# Patient Record
Sex: FEMALE | Race: WHITE | NOT HISPANIC OR LATINO
[De-identification: names, ages, dates, MRNs, and addresses within clinical notes are randomized per-mention and may not be internally consistent; named-entity substitution may affect disease eponyms.]

---

## 2021-01-01 ENCOUNTER — APPOINTMENT (OUTPATIENT)
Dept: VASCULAR SURGERY | Facility: HOSPITAL | Age: 61
End: 2021-01-01

## 2021-01-01 ENCOUNTER — APPOINTMENT (OUTPATIENT)
Dept: INFUSION THERAPY | Facility: CLINIC | Age: 61
End: 2021-01-01

## 2021-01-01 ENCOUNTER — NON-APPOINTMENT (OUTPATIENT)
Age: 61
End: 2021-01-01

## 2021-01-01 ENCOUNTER — RESULT REVIEW (OUTPATIENT)
Age: 61
End: 2021-01-01

## 2021-01-01 ENCOUNTER — APPOINTMENT (OUTPATIENT)
Dept: HEMATOLOGY ONCOLOGY | Facility: CLINIC | Age: 61
End: 2021-01-01
Payer: COMMERCIAL

## 2021-01-01 ENCOUNTER — APPOINTMENT (OUTPATIENT)
Dept: HEMATOLOGY ONCOLOGY | Facility: CLINIC | Age: 61
End: 2021-01-01

## 2021-01-01 ENCOUNTER — OUTPATIENT (OUTPATIENT)
Dept: OUTPATIENT SERVICES | Facility: HOSPITAL | Age: 61
LOS: 1 days | Discharge: HOME | End: 2021-01-01

## 2021-01-01 ENCOUNTER — TRANSCRIPTION ENCOUNTER (OUTPATIENT)
Age: 61
End: 2021-01-01

## 2021-01-01 ENCOUNTER — APPOINTMENT (OUTPATIENT)
Dept: VASCULAR SURGERY | Facility: CLINIC | Age: 61
End: 2021-01-01
Payer: COMMERCIAL

## 2021-01-01 ENCOUNTER — LABORATORY RESULT (OUTPATIENT)
Age: 61
End: 2021-01-01

## 2021-01-01 ENCOUNTER — APPOINTMENT (OUTPATIENT)
Dept: GASTROENTEROLOGY | Facility: CLINIC | Age: 61
End: 2021-01-01

## 2021-01-01 ENCOUNTER — EMERGENCY (EMERGENCY)
Facility: HOSPITAL | Age: 61
LOS: 0 days | Discharge: HOME | End: 2021-09-02
Attending: STUDENT IN AN ORGANIZED HEALTH CARE EDUCATION/TRAINING PROGRAM | Admitting: STUDENT IN AN ORGANIZED HEALTH CARE EDUCATION/TRAINING PROGRAM
Payer: COMMERCIAL

## 2021-01-01 ENCOUNTER — EMERGENCY (EMERGENCY)
Facility: HOSPITAL | Age: 61
LOS: 0 days | Discharge: HOME | End: 2021-09-13
Attending: EMERGENCY MEDICINE | Admitting: EMERGENCY MEDICINE
Payer: COMMERCIAL

## 2021-01-01 ENCOUNTER — OUTPATIENT (OUTPATIENT)
Dept: OUTPATIENT SERVICES | Facility: HOSPITAL | Age: 61
LOS: 1 days | Discharge: HOME | End: 2021-01-01
Payer: COMMERCIAL

## 2021-01-01 ENCOUNTER — APPOINTMENT (OUTPATIENT)
Dept: GASTROENTEROLOGY | Facility: CLINIC | Age: 61
End: 2021-01-01
Payer: COMMERCIAL

## 2021-01-01 ENCOUNTER — INPATIENT (INPATIENT)
Facility: HOSPITAL | Age: 61
LOS: 3 days | Discharge: HOME | End: 2021-12-03
Attending: HOSPITALIST | Admitting: HOSPITALIST
Payer: COMMERCIAL

## 2021-01-01 ENCOUNTER — INPATIENT (INPATIENT)
Facility: HOSPITAL | Age: 61
LOS: 0 days | Discharge: HOME | End: 2021-08-04
Attending: HOSPITALIST | Admitting: HOSPITALIST
Payer: COMMERCIAL

## 2021-01-01 ENCOUNTER — APPOINTMENT (OUTPATIENT)
Dept: INFUSION THERAPY | Facility: CLINIC | Age: 61
End: 2021-01-01
Payer: COMMERCIAL

## 2021-01-01 VITALS
OXYGEN SATURATION: 98 % | DIASTOLIC BLOOD PRESSURE: 63 MMHG | TEMPERATURE: 98 F | RESPIRATION RATE: 21 BRPM | HEART RATE: 70 BPM | SYSTOLIC BLOOD PRESSURE: 131 MMHG

## 2021-01-01 VITALS
DIASTOLIC BLOOD PRESSURE: 70 MMHG | SYSTOLIC BLOOD PRESSURE: 117 MMHG | OXYGEN SATURATION: 100 % | RESPIRATION RATE: 16 BRPM | HEART RATE: 70 BPM

## 2021-01-01 VITALS
RESPIRATION RATE: 18 BRPM | DIASTOLIC BLOOD PRESSURE: 69 MMHG | OXYGEN SATURATION: 100 % | SYSTOLIC BLOOD PRESSURE: 137 MMHG | TEMPERATURE: 97 F | HEART RATE: 84 BPM

## 2021-01-01 VITALS
DIASTOLIC BLOOD PRESSURE: 69 MMHG | RESPIRATION RATE: 18 BRPM | OXYGEN SATURATION: 96 % | SYSTOLIC BLOOD PRESSURE: 142 MMHG | HEART RATE: 80 BPM

## 2021-01-01 VITALS
TEMPERATURE: 98 F | HEIGHT: 66 IN | DIASTOLIC BLOOD PRESSURE: 100 MMHG | HEART RATE: 98 BPM | WEIGHT: 160.06 LBS | RESPIRATION RATE: 18 BRPM | SYSTOLIC BLOOD PRESSURE: 141 MMHG

## 2021-01-01 VITALS
SYSTOLIC BLOOD PRESSURE: 134 MMHG | TEMPERATURE: 98 F | RESPIRATION RATE: 18 BRPM | DIASTOLIC BLOOD PRESSURE: 86 MMHG | HEART RATE: 102 BPM | HEIGHT: 66 IN | WEIGHT: 145.06 LBS

## 2021-01-01 VITALS
HEART RATE: 106 BPM | BODY MASS INDEX: 25.23 KG/M2 | DIASTOLIC BLOOD PRESSURE: 76 MMHG | SYSTOLIC BLOOD PRESSURE: 128 MMHG | TEMPERATURE: 98.7 F | RESPIRATION RATE: 16 BRPM | HEIGHT: 66 IN | WEIGHT: 157 LBS | OXYGEN SATURATION: 98 %

## 2021-01-01 VITALS
RESPIRATION RATE: 16 BRPM | HEART RATE: 84 BPM | HEIGHT: 66 IN | TEMPERATURE: 97 F | SYSTOLIC BLOOD PRESSURE: 143 MMHG | WEIGHT: 160.06 LBS | OXYGEN SATURATION: 96 % | DIASTOLIC BLOOD PRESSURE: 91 MMHG

## 2021-01-01 VITALS
SYSTOLIC BLOOD PRESSURE: 133 MMHG | HEART RATE: 97 BPM | TEMPERATURE: 98 F | RESPIRATION RATE: 18 BRPM | OXYGEN SATURATION: 99 % | DIASTOLIC BLOOD PRESSURE: 66 MMHG | WEIGHT: 132.06 LBS | HEIGHT: 66 IN

## 2021-01-01 VITALS
DIASTOLIC BLOOD PRESSURE: 69 MMHG | RESPIRATION RATE: 18 BRPM | TEMPERATURE: 98 F | SYSTOLIC BLOOD PRESSURE: 101 MMHG | HEART RATE: 90 BPM

## 2021-01-01 VITALS
TEMPERATURE: 98 F | HEIGHT: 66 IN | RESPIRATION RATE: 18 BRPM | WEIGHT: 130.07 LBS | DIASTOLIC BLOOD PRESSURE: 66 MMHG | OXYGEN SATURATION: 99 % | HEART RATE: 98 BPM | SYSTOLIC BLOOD PRESSURE: 133 MMHG

## 2021-01-01 VITALS
HEART RATE: 96 BPM | SYSTOLIC BLOOD PRESSURE: 123 MMHG | RESPIRATION RATE: 16 BRPM | WEIGHT: 150 LBS | DIASTOLIC BLOOD PRESSURE: 76 MMHG | HEIGHT: 66 IN | TEMPERATURE: 97.7 F | BODY MASS INDEX: 24.11 KG/M2

## 2021-01-01 VITALS
WEIGHT: 146 LBS | TEMPERATURE: 97.8 F | BODY MASS INDEX: 22.82 KG/M2 | SYSTOLIC BLOOD PRESSURE: 123 MMHG | WEIGHT: 142 LBS | HEART RATE: 103 BPM | HEIGHT: 66 IN | OXYGEN SATURATION: 98 % | HEART RATE: 108 BPM | HEIGHT: 66 IN | BODY MASS INDEX: 23.46 KG/M2 | SYSTOLIC BLOOD PRESSURE: 114 MMHG | DIASTOLIC BLOOD PRESSURE: 79 MMHG | RESPIRATION RATE: 16 BRPM | TEMPERATURE: 97.7 F | DIASTOLIC BLOOD PRESSURE: 78 MMHG

## 2021-01-01 VITALS
HEIGHT: 66 IN | HEART RATE: 105 BPM | DIASTOLIC BLOOD PRESSURE: 71 MMHG | TEMPERATURE: 96.6 F | BODY MASS INDEX: 17.68 KG/M2 | WEIGHT: 110 LBS | RESPIRATION RATE: 14 BRPM | SYSTOLIC BLOOD PRESSURE: 94 MMHG

## 2021-01-01 VITALS — SYSTOLIC BLOOD PRESSURE: 100 MMHG | HEART RATE: 82 BPM | DIASTOLIC BLOOD PRESSURE: 60 MMHG

## 2021-01-01 VITALS
HEART RATE: 106 BPM | DIASTOLIC BLOOD PRESSURE: 62 MMHG | TEMPERATURE: 98 F | RESPIRATION RATE: 18 BRPM | HEIGHT: 66 IN | OXYGEN SATURATION: 100 % | SYSTOLIC BLOOD PRESSURE: 113 MMHG

## 2021-01-01 VITALS
HEART RATE: 101 BPM | DIASTOLIC BLOOD PRESSURE: 71 MMHG | RESPIRATION RATE: 18 BRPM | WEIGHT: 108 LBS | TEMPERATURE: 98.6 F | HEIGHT: 66 IN | SYSTOLIC BLOOD PRESSURE: 113 MMHG | BODY MASS INDEX: 17.36 KG/M2

## 2021-01-01 VITALS
DIASTOLIC BLOOD PRESSURE: 87 MMHG | HEART RATE: 92 BPM | SYSTOLIC BLOOD PRESSURE: 163 MMHG | TEMPERATURE: 98 F | OXYGEN SATURATION: 97 % | RESPIRATION RATE: 18 BRPM

## 2021-01-01 VITALS
DIASTOLIC BLOOD PRESSURE: 71 MMHG | RESPIRATION RATE: 18 BRPM | SYSTOLIC BLOOD PRESSURE: 130 MMHG | OXYGEN SATURATION: 100 % | TEMPERATURE: 95 F | HEIGHT: 66 IN | HEART RATE: 101 BPM | WEIGHT: 139.99 LBS

## 2021-01-01 DIAGNOSIS — R60.0 LOCALIZED EDEMA: ICD-10-CM

## 2021-01-01 DIAGNOSIS — Z85.07 PERSONAL HISTORY OF MALIGNANT NEOPLASM OF PANCREAS: ICD-10-CM

## 2021-01-01 DIAGNOSIS — K86.1 OTHER CHRONIC PANCREATITIS: ICD-10-CM

## 2021-01-01 DIAGNOSIS — E87.6 HYPOKALEMIA: ICD-10-CM

## 2021-01-01 DIAGNOSIS — R18.8 OTHER ASCITES: ICD-10-CM

## 2021-01-01 DIAGNOSIS — I10 ESSENTIAL (PRIMARY) HYPERTENSION: ICD-10-CM

## 2021-01-01 DIAGNOSIS — Z92.21 PERSONAL HISTORY OF ANTINEOPLASTIC CHEMOTHERAPY: ICD-10-CM

## 2021-01-01 DIAGNOSIS — E78.5 HYPERLIPIDEMIA, UNSPECIFIED: ICD-10-CM

## 2021-01-01 DIAGNOSIS — E11.9 TYPE 2 DIABETES MELLITUS WITHOUT COMPLICATIONS: ICD-10-CM

## 2021-01-01 DIAGNOSIS — D49.0 NEOPLASM OF UNSPECIFIED BEHAVIOR OF DIGESTIVE SYSTEM: ICD-10-CM

## 2021-01-01 DIAGNOSIS — K29.50 UNSPECIFIED CHRONIC GASTRITIS WITHOUT BLEEDING: ICD-10-CM

## 2021-01-01 DIAGNOSIS — I81 PORTAL VEIN THROMBOSIS: ICD-10-CM

## 2021-01-01 DIAGNOSIS — K52.1 TOXIC GASTROENTERITIS AND COLITIS: ICD-10-CM

## 2021-01-01 DIAGNOSIS — T45.1X5A ADVERSE EFFECT OF ANTINEOPLASTIC AND IMMUNOSUPPRESSIVE DRUGS, INITIAL ENCOUNTER: ICD-10-CM
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
## 2021-01-01 DIAGNOSIS — R19.7 DIARRHEA, UNSPECIFIED: ICD-10-CM

## 2021-01-01 DIAGNOSIS — Z78.9 OTHER SPECIFIED HEALTH STATUS: ICD-10-CM

## 2021-01-01 DIAGNOSIS — R60.1 GENERALIZED EDEMA: ICD-10-CM

## 2021-01-01 DIAGNOSIS — D69.59 OTHER SECONDARY THROMBOCYTOPENIA: ICD-10-CM

## 2021-01-01 DIAGNOSIS — E87.1 HYPO-OSMOLALITY AND HYPONATREMIA: ICD-10-CM

## 2021-01-01 DIAGNOSIS — R09.89 OTHER SPECIFIED SYMPTOMS AND SIGNS INVOLVING THE CIRCULATORY AND RESPIRATORY SYSTEMS: ICD-10-CM

## 2021-01-01 DIAGNOSIS — C25.9 MALIGNANT NEOPLASM OF PANCREAS, UNSPECIFIED: ICD-10-CM

## 2021-01-01 DIAGNOSIS — K86.89 OTHER SPECIFIED DISEASES OF PANCREAS: ICD-10-CM

## 2021-01-01 DIAGNOSIS — Z20.822 CONTACT WITH AND (SUSPECTED) EXPOSURE TO COVID-19: ICD-10-CM

## 2021-01-01 DIAGNOSIS — K29.80 DUODENITIS WITHOUT BLEEDING: ICD-10-CM

## 2021-01-01 DIAGNOSIS — Z00.00 ENCOUNTER FOR GENERAL ADULT MEDICAL EXAMINATION W/OUT ABNORMAL FINDINGS: ICD-10-CM

## 2021-01-01 DIAGNOSIS — D69.6 THROMBOCYTOPENIA, UNSPECIFIED: ICD-10-CM

## 2021-01-01 DIAGNOSIS — R93.3 ABNORMAL FINDINGS ON DIAGNOSTIC IMAGING OF OTHER PARTS OF DIGESTIVE TRACT: ICD-10-CM

## 2021-01-01 DIAGNOSIS — I82.890 ACUTE EMBOLISM AND THROMBOSIS OF OTHER SPECIFIED VEINS: ICD-10-CM

## 2021-01-01 DIAGNOSIS — J90 PLEURAL EFFUSION, NOT ELSEWHERE CLASSIFIED: ICD-10-CM

## 2021-01-01 DIAGNOSIS — Z11.59 ENCOUNTER FOR SCREENING FOR OTHER VIRAL DISEASES: ICD-10-CM

## 2021-01-01 DIAGNOSIS — C25.8 MALIGNANT NEOPLASM OF OVERLAPPING SITES OF PANCREAS: ICD-10-CM

## 2021-01-01 DIAGNOSIS — Z86.79 PERSONAL HISTORY OF OTHER DISEASES OF THE CIRCULATORY SYSTEM: ICD-10-CM

## 2021-01-01 DIAGNOSIS — M79.89 OTHER SPECIFIED SOFT TISSUE DISORDERS: ICD-10-CM

## 2021-01-01 DIAGNOSIS — R00.0 TACHYCARDIA, UNSPECIFIED: ICD-10-CM

## 2021-01-01 DIAGNOSIS — R94.31 ABNORMAL ELECTROCARDIOGRAM [ECG] [EKG]: ICD-10-CM

## 2021-01-01 DIAGNOSIS — K25.9 GASTRIC ULCER, UNSPECIFIED AS ACUTE OR CHRONIC, WITHOUT HEMORRHAGE OR PERFORATION: ICD-10-CM

## 2021-01-01 DIAGNOSIS — Z80.0 FAMILY HISTORY OF MALIGNANT NEOPLASM OF DIGESTIVE ORGANS: ICD-10-CM

## 2021-01-01 LAB
ALBUMIN FLD-MCNC: 0.7 G/DL — SIGNIFICANT CHANGE UP
ALBUMIN SERPL ELPH-MCNC: 2.3 G/DL
ALBUMIN SERPL ELPH-MCNC: 2.3 G/DL — LOW (ref 3.5–5.2)
ALBUMIN SERPL ELPH-MCNC: 2.3 G/DL — LOW (ref 3.5–5.2)
ALBUMIN SERPL ELPH-MCNC: 2.4 G/DL — LOW (ref 3.5–5.2)
ALBUMIN SERPL ELPH-MCNC: 2.5 G/DL
ALBUMIN SERPL ELPH-MCNC: 3 G/DL
ALBUMIN SERPL ELPH-MCNC: 3.1 G/DL — LOW (ref 3.5–5.2)
ALBUMIN SERPL ELPH-MCNC: 3.4 G/DL
ALBUMIN SERPL ELPH-MCNC: 3.5 G/DL — SIGNIFICANT CHANGE UP (ref 3.5–5.2)
ALBUMIN SERPL ELPH-MCNC: 3.5 G/DL — SIGNIFICANT CHANGE UP (ref 3.5–5.2)
ALBUMIN SERPL ELPH-MCNC: 3.6 G/DL — SIGNIFICANT CHANGE UP (ref 3.5–5.2)
ALBUMIN SERPL ELPH-MCNC: 3.9 G/DL — SIGNIFICANT CHANGE UP (ref 3.5–5.2)
ALP BLD-CCNC: 107 U/L
ALP BLD-CCNC: 118 U/L
ALP BLD-CCNC: 171 U/L
ALP BLD-CCNC: 445 U/L
ALP SERPL-CCNC: 100 U/L — SIGNIFICANT CHANGE UP (ref 30–115)
ALP SERPL-CCNC: 107 U/L — SIGNIFICANT CHANGE UP (ref 30–115)
ALP SERPL-CCNC: 108 U/L — SIGNIFICANT CHANGE UP (ref 30–115)
ALP SERPL-CCNC: 122 U/L — HIGH (ref 30–115)
ALP SERPL-CCNC: 128 U/L — HIGH (ref 30–115)
ALP SERPL-CCNC: 135 U/L — HIGH (ref 30–115)
ALP SERPL-CCNC: 142 U/L — HIGH (ref 30–115)
ALP SERPL-CCNC: 216 U/L — HIGH (ref 30–115)
ALP SERPL-CCNC: 229 U/L — HIGH (ref 30–115)
ALP SERPL-CCNC: 98 U/L — SIGNIFICANT CHANGE UP (ref 30–115)
ALT FLD-CCNC: 11 U/L — SIGNIFICANT CHANGE UP (ref 0–41)
ALT FLD-CCNC: 15 U/L — SIGNIFICANT CHANGE UP (ref 0–41)
ALT FLD-CCNC: 16 U/L — SIGNIFICANT CHANGE UP (ref 0–41)
ALT FLD-CCNC: 17 U/L — SIGNIFICANT CHANGE UP (ref 0–41)
ALT FLD-CCNC: 18 U/L — SIGNIFICANT CHANGE UP (ref 0–41)
ALT FLD-CCNC: 18 U/L — SIGNIFICANT CHANGE UP (ref 0–41)
ALT FLD-CCNC: 19 U/L — SIGNIFICANT CHANGE UP (ref 0–41)
ALT FLD-CCNC: 20 U/L — SIGNIFICANT CHANGE UP (ref 0–41)
ALT SERPL-CCNC: 15 U/L
ALT SERPL-CCNC: 16 U/L
ALT SERPL-CCNC: 16 U/L
ALT SERPL-CCNC: 9 U/L
ANION GAP SERPL CALC-SCNC: 10 MMOL/L — SIGNIFICANT CHANGE UP (ref 7–14)
ANION GAP SERPL CALC-SCNC: 11 MMOL/L — SIGNIFICANT CHANGE UP (ref 7–14)
ANION GAP SERPL CALC-SCNC: 12 MMOL/L
ANION GAP SERPL CALC-SCNC: 13 MMOL/L
ANION GAP SERPL CALC-SCNC: 13 MMOL/L — SIGNIFICANT CHANGE UP (ref 7–14)
ANION GAP SERPL CALC-SCNC: 13 MMOL/L — SIGNIFICANT CHANGE UP (ref 7–14)
ANION GAP SERPL CALC-SCNC: 14 MMOL/L
ANION GAP SERPL CALC-SCNC: 14 MMOL/L — SIGNIFICANT CHANGE UP (ref 7–14)
ANION GAP SERPL CALC-SCNC: 15 MMOL/L
ANION GAP SERPL CALC-SCNC: 16 MMOL/L — HIGH (ref 7–14)
ANION GAP SERPL CALC-SCNC: 16 MMOL/L — HIGH (ref 7–14)
ANION GAP SERPL CALC-SCNC: 17 MMOL/L — HIGH (ref 7–14)
ANISOCYTOSIS BLD QL: SIGNIFICANT CHANGE UP
ANISOCYTOSIS BLD QL: SLIGHT — SIGNIFICANT CHANGE UP
ANISOCYTOSIS BLD QL: SLIGHT — SIGNIFICANT CHANGE UP
APTT BLD: 28.6 SEC — SIGNIFICANT CHANGE UP (ref 27–39.2)
APTT BLD: 29.5 SEC — SIGNIFICANT CHANGE UP (ref 27–39.2)
AST SERPL-CCNC: 12 U/L — SIGNIFICANT CHANGE UP (ref 0–41)
AST SERPL-CCNC: 18 U/L
AST SERPL-CCNC: 19 U/L — SIGNIFICANT CHANGE UP (ref 0–41)
AST SERPL-CCNC: 20 U/L — SIGNIFICANT CHANGE UP (ref 0–41)
AST SERPL-CCNC: 20 U/L — SIGNIFICANT CHANGE UP (ref 0–41)
AST SERPL-CCNC: 23 U/L
AST SERPL-CCNC: 26 U/L — SIGNIFICANT CHANGE UP (ref 0–41)
AST SERPL-CCNC: 28 U/L
AST SERPL-CCNC: 29 U/L
AST SERPL-CCNC: 34 U/L — SIGNIFICANT CHANGE UP (ref 0–41)
AST SERPL-CCNC: 35 U/L — SIGNIFICANT CHANGE UP (ref 0–41)
AST SERPL-CCNC: 38 U/L — SIGNIFICANT CHANGE UP (ref 0–41)
AST SERPL-CCNC: 41 U/L — SIGNIFICANT CHANGE UP (ref 0–41)
AST SERPL-CCNC: 43 U/L — HIGH (ref 0–41)
B PERT IGG+IGM PNL SER: CLEAR — SIGNIFICANT CHANGE UP
BASOPHILS # BLD AUTO: 0 K/UL — SIGNIFICANT CHANGE UP (ref 0–0.2)
BASOPHILS # BLD AUTO: 0.01 K/UL — SIGNIFICANT CHANGE UP (ref 0–0.2)
BASOPHILS NFR BLD AUTO: 0 % — SIGNIFICANT CHANGE UP (ref 0–1)
BASOPHILS NFR BLD AUTO: 0.1 % — SIGNIFICANT CHANGE UP (ref 0–1)
BASOPHILS NFR BLD AUTO: 0.2 % — SIGNIFICANT CHANGE UP (ref 0–1)
BASOPHILS NFR BLD AUTO: 0.2 % — SIGNIFICANT CHANGE UP (ref 0–1)
BASOPHILS NFR BLD AUTO: 0.3 % — SIGNIFICANT CHANGE UP (ref 0–1)
BILIRUB SERPL-MCNC: 0.6 MG/DL
BILIRUB SERPL-MCNC: 0.9 MG/DL — SIGNIFICANT CHANGE UP (ref 0.2–1.2)
BILIRUB SERPL-MCNC: 1 MG/DL — SIGNIFICANT CHANGE UP (ref 0.2–1.2)
BILIRUB SERPL-MCNC: 1.1 MG/DL
BILIRUB SERPL-MCNC: 1.1 MG/DL
BILIRUB SERPL-MCNC: 1.1 MG/DL — SIGNIFICANT CHANGE UP (ref 0.2–1.2)
BILIRUB SERPL-MCNC: 1.2 MG/DL — SIGNIFICANT CHANGE UP (ref 0.2–1.2)
BILIRUB SERPL-MCNC: 1.2 MG/DL — SIGNIFICANT CHANGE UP (ref 0.2–1.2)
BILIRUB SERPL-MCNC: 1.9 MG/DL
BLD GP AB SCN SERPL QL: SIGNIFICANT CHANGE UP
BUN SERPL-MCNC: 11 MG/DL — SIGNIFICANT CHANGE UP (ref 10–20)
BUN SERPL-MCNC: 14 MG/DL
BUN SERPL-MCNC: 15 MG/DL — SIGNIFICANT CHANGE UP (ref 10–20)
BUN SERPL-MCNC: 16 MG/DL
BUN SERPL-MCNC: 4 MG/DL — LOW (ref 10–20)
BUN SERPL-MCNC: 5 MG/DL
BUN SERPL-MCNC: 5 MG/DL — LOW (ref 10–20)
BUN SERPL-MCNC: 5 MG/DL — LOW (ref 10–20)
BUN SERPL-MCNC: 7 MG/DL
BUN SERPL-MCNC: 7 MG/DL — LOW (ref 10–20)
BUN SERPL-MCNC: 9 MG/DL — LOW (ref 10–20)
BUN SERPL-MCNC: 9 MG/DL — LOW (ref 10–20)
CALCIUM SERPL-MCNC: 7.4 MG/DL
CALCIUM SERPL-MCNC: 7.5 MG/DL — LOW (ref 8.5–10.1)
CALCIUM SERPL-MCNC: 7.7 MG/DL — LOW (ref 8.5–10.1)
CALCIUM SERPL-MCNC: 7.8 MG/DL — LOW (ref 8.5–10.1)
CALCIUM SERPL-MCNC: 8 MG/DL
CALCIUM SERPL-MCNC: 8.6 MG/DL
CALCIUM SERPL-MCNC: 8.7 MG/DL — SIGNIFICANT CHANGE UP (ref 8.5–10.1)
CALCIUM SERPL-MCNC: 8.9 MG/DL — SIGNIFICANT CHANGE UP (ref 8.5–10.1)
CALCIUM SERPL-MCNC: 9 MG/DL
CALCIUM SERPL-MCNC: 9.1 MG/DL — SIGNIFICANT CHANGE UP (ref 8.5–10.1)
CALCIUM SERPL-MCNC: 9.4 MG/DL — SIGNIFICANT CHANGE UP (ref 8.5–10.1)
CALCIUM SERPL-MCNC: 9.6 MG/DL — SIGNIFICANT CHANGE UP (ref 8.5–10.1)
CANCER AG19-9 SERPL-ACNC: 652 U/ML
CHLORIDE SERPL-SCNC: 100 MMOL/L — SIGNIFICANT CHANGE UP (ref 98–110)
CHLORIDE SERPL-SCNC: 100 MMOL/L — SIGNIFICANT CHANGE UP (ref 98–110)
CHLORIDE SERPL-SCNC: 101 MMOL/L
CHLORIDE SERPL-SCNC: 101 MMOL/L — SIGNIFICANT CHANGE UP (ref 98–110)
CHLORIDE SERPL-SCNC: 103 MMOL/L — SIGNIFICANT CHANGE UP (ref 98–110)
CHLORIDE SERPL-SCNC: 104 MMOL/L — SIGNIFICANT CHANGE UP (ref 98–110)
CHLORIDE SERPL-SCNC: 106 MMOL/L
CHLORIDE SERPL-SCNC: 106 MMOL/L — SIGNIFICANT CHANGE UP (ref 98–110)
CHLORIDE SERPL-SCNC: 108 MMOL/L — SIGNIFICANT CHANGE UP (ref 98–110)
CHLORIDE SERPL-SCNC: 96 MMOL/L
CHLORIDE SERPL-SCNC: 98 MMOL/L
CHLORIDE SERPL-SCNC: 98 MMOL/L — SIGNIFICANT CHANGE UP (ref 98–110)
CHLORIDE SERPL-SCNC: 99 MMOL/L — SIGNIFICANT CHANGE UP (ref 98–110)
CHLORIDE SERPL-SCNC: 99 MMOL/L — SIGNIFICANT CHANGE UP (ref 98–110)
CO2 SERPL-SCNC: 14 MMOL/L — LOW (ref 17–32)
CO2 SERPL-SCNC: 18 MMOL/L
CO2 SERPL-SCNC: 18 MMOL/L — SIGNIFICANT CHANGE UP (ref 17–32)
CO2 SERPL-SCNC: 18 MMOL/L — SIGNIFICANT CHANGE UP (ref 17–32)
CO2 SERPL-SCNC: 19 MMOL/L — SIGNIFICANT CHANGE UP (ref 17–32)
CO2 SERPL-SCNC: 20 MMOL/L
CO2 SERPL-SCNC: 21 MMOL/L
CO2 SERPL-SCNC: 21 MMOL/L — SIGNIFICANT CHANGE UP (ref 17–32)
CO2 SERPL-SCNC: 22 MMOL/L — SIGNIFICANT CHANGE UP (ref 17–32)
CO2 SERPL-SCNC: 22 MMOL/L — SIGNIFICANT CHANGE UP (ref 17–32)
CO2 SERPL-SCNC: 25 MMOL/L
CO2 SERPL-SCNC: 25 MMOL/L — SIGNIFICANT CHANGE UP (ref 17–32)
COLOR FLD: SIGNIFICANT CHANGE UP
COVID-19 SPIKE DOMAIN AB INTERP: POSITIVE
COVID-19 SPIKE DOMAIN ANTIBODY RESULT: >250 U/ML — HIGH
CREAT SERPL-MCNC: 0.5 MG/DL
CREAT SERPL-MCNC: 0.5 MG/DL
CREAT SERPL-MCNC: 0.5 MG/DL — LOW (ref 0.7–1.5)
CREAT SERPL-MCNC: 0.6 MG/DL
CREAT SERPL-MCNC: 0.6 MG/DL — LOW (ref 0.7–1.5)
CREAT SERPL-MCNC: 0.6 MG/DL — LOW (ref 0.7–1.5)
CREAT SERPL-MCNC: 0.7 MG/DL — SIGNIFICANT CHANGE UP (ref 0.7–1.5)
CREAT SERPL-MCNC: 0.8 MG/DL — SIGNIFICANT CHANGE UP (ref 0.7–1.5)
CREAT SERPL-MCNC: <0.5 MG/DL
CREAT SERPL-MCNC: <0.5 MG/DL — LOW (ref 0.7–1.5)
CULTURE RESULTS: NO GROWTH — SIGNIFICANT CHANGE UP
EOSINOPHIL # BLD AUTO: 0 K/UL — SIGNIFICANT CHANGE UP (ref 0–0.7)
EOSINOPHIL # BLD AUTO: 0.01 K/UL — SIGNIFICANT CHANGE UP (ref 0–0.7)
EOSINOPHIL NFR BLD AUTO: 0 % — SIGNIFICANT CHANGE UP (ref 0–8)
EOSINOPHIL NFR BLD AUTO: 0.2 % — SIGNIFICANT CHANGE UP (ref 0–8)
FERRITIN SERPL-MCNC: 1919 NG/ML
FLUID INTAKE SUBSTANCE CLASS: SIGNIFICANT CHANGE UP
FLUID SEGMENTED GRANULOCYTES: SIGNIFICANT CHANGE UP %
FOLATE SERPL-MCNC: 6.9 NG/ML — SIGNIFICANT CHANGE UP
GIANT PLATELETS BLD QL SMEAR: PRESENT — SIGNIFICANT CHANGE UP
GLUCOSE FLD-MCNC: 110 MG/DL — SIGNIFICANT CHANGE UP
GLUCOSE SERPL-MCNC: 100 MG/DL — HIGH (ref 70–99)
GLUCOSE SERPL-MCNC: 102 MG/DL
GLUCOSE SERPL-MCNC: 105 MG/DL
GLUCOSE SERPL-MCNC: 127 MG/DL
GLUCOSE SERPL-MCNC: 149 MG/DL — HIGH (ref 70–99)
GLUCOSE SERPL-MCNC: 150 MG/DL — HIGH (ref 70–99)
GLUCOSE SERPL-MCNC: 168 MG/DL — HIGH (ref 70–99)
GLUCOSE SERPL-MCNC: 176 MG/DL — HIGH (ref 70–99)
GLUCOSE SERPL-MCNC: 77 MG/DL — SIGNIFICANT CHANGE UP (ref 70–99)
GLUCOSE SERPL-MCNC: 83 MG/DL — SIGNIFICANT CHANGE UP (ref 70–99)
GLUCOSE SERPL-MCNC: 89 MG/DL
GLUCOSE SERPL-MCNC: 89 MG/DL — SIGNIFICANT CHANGE UP (ref 70–99)
GLUCOSE SERPL-MCNC: 96 MG/DL — SIGNIFICANT CHANGE UP (ref 70–99)
GLUCOSE SERPL-MCNC: 99 MG/DL — SIGNIFICANT CHANGE UP (ref 70–99)
GRAM STN FLD: SIGNIFICANT CHANGE UP
HCT VFR BLD CALC: 22.2 % — LOW (ref 37–47)
HCT VFR BLD CALC: 22.3 % — LOW (ref 37–47)
HCT VFR BLD CALC: 25 % — LOW (ref 37–47)
HCT VFR BLD CALC: 26.4 % — LOW (ref 37–47)
HCT VFR BLD CALC: 27.3 % — LOW (ref 37–47)
HCT VFR BLD CALC: 27.7 % — LOW (ref 37–47)
HCT VFR BLD CALC: 28.2 % — LOW (ref 37–47)
HCT VFR BLD CALC: 28.4 %
HCT VFR BLD CALC: 28.8 %
HCT VFR BLD CALC: 30.6 %
HCT VFR BLD CALC: 32 %
HCT VFR BLD CALC: 32.4 %
HCT VFR BLD CALC: 33.3 % — LOW (ref 37–47)
HCT VFR BLD CALC: 33.8 %
HCT VFR BLD CALC: 34.6 % — LOW (ref 37–47)
HCT VFR BLD CALC: 35.4 %
HCT VFR BLD CALC: 36 % — LOW (ref 37–47)
HCT VFR BLD CALC: 37.5 %
HCT VFR BLD CALC: 37.5 %
HCT VFR BLD CALC: 38.2 % — SIGNIFICANT CHANGE UP (ref 37–47)
HCV AB S/CO SERPL IA: 0.04 COI — SIGNIFICANT CHANGE UP
HCV AB SERPL-IMP: SIGNIFICANT CHANGE UP
HGB BLD-MCNC: 10.2 G/DL
HGB BLD-MCNC: 10.7 G/DL
HGB BLD-MCNC: 10.8 G/DL
HGB BLD-MCNC: 11 G/DL
HGB BLD-MCNC: 11.3 G/DL — LOW (ref 12–16)
HGB BLD-MCNC: 12 G/DL — SIGNIFICANT CHANGE UP (ref 12–16)
HGB BLD-MCNC: 12.2 G/DL — SIGNIFICANT CHANGE UP (ref 12–16)
HGB BLD-MCNC: 12.4 G/DL
HGB BLD-MCNC: 12.7 G/DL
HGB BLD-MCNC: 12.8 G/DL
HGB BLD-MCNC: 13.2 G/DL — SIGNIFICANT CHANGE UP (ref 12–16)
HGB BLD-MCNC: 7.5 G/DL — LOW (ref 12–16)
HGB BLD-MCNC: 7.6 G/DL — LOW (ref 12–16)
HGB BLD-MCNC: 8.3 G/DL — LOW (ref 12–16)
HGB BLD-MCNC: 8.8 G/DL — LOW (ref 12–16)
HGB BLD-MCNC: 9 G/DL — LOW (ref 12–16)
HGB BLD-MCNC: 9.1 G/DL — LOW (ref 12–16)
HGB BLD-MCNC: 9.3 G/DL — LOW (ref 12–16)
HGB BLD-MCNC: 9.7 G/DL
HGB BLD-MCNC: 9.8 G/DL
HOWELL-JOLLY BOD BLD QL SMEAR: PRESENT — SIGNIFICANT CHANGE UP
IMM GRANULOCYTES NFR BLD AUTO: 0.5 % — HIGH (ref 0.1–0.3)
IMM GRANULOCYTES NFR BLD AUTO: 0.7 % — HIGH (ref 0.1–0.3)
IMM GRANULOCYTES NFR BLD AUTO: 2.1 % — HIGH (ref 0.1–0.3)
IMM GRANULOCYTES NFR BLD AUTO: 5.1 % — HIGH (ref 0.1–0.3)
INR BLD: 1.37 RATIO — HIGH (ref 0.65–1.3)
INR BLD: 1.38 RATIO — HIGH (ref 0.65–1.3)
LACTATE SERPL-SCNC: 1.4 MMOL/L — SIGNIFICANT CHANGE UP (ref 0.7–2)
LDH SERPL L TO P-CCNC: 75 U/L — SIGNIFICANT CHANGE UP
LYMPHOCYTES # BLD AUTO: 0.38 K/UL — LOW (ref 1.2–3.4)
LYMPHOCYTES # BLD AUTO: 0.45 K/UL — LOW (ref 1.2–3.4)
LYMPHOCYTES # BLD AUTO: 0.49 K/UL — LOW (ref 1.2–3.4)
LYMPHOCYTES # BLD AUTO: 1.02 K/UL — LOW (ref 1.2–3.4)
LYMPHOCYTES # BLD AUTO: 1.06 K/UL — LOW (ref 1.2–3.4)
LYMPHOCYTES # BLD AUTO: 1.26 K/UL — SIGNIFICANT CHANGE UP (ref 1.2–3.4)
LYMPHOCYTES # BLD AUTO: 1.39 K/UL — SIGNIFICANT CHANGE UP (ref 1.2–3.4)
LYMPHOCYTES # BLD AUTO: 12.2 % — LOW (ref 20.5–51.1)
LYMPHOCYTES # BLD AUTO: 17.8 % — LOW (ref 20.5–51.1)
LYMPHOCYTES # BLD AUTO: 17.9 % — LOW (ref 20.5–51.1)
LYMPHOCYTES # BLD AUTO: 23.6 % — SIGNIFICANT CHANGE UP (ref 20.5–51.1)
LYMPHOCYTES # BLD AUTO: 25 % — SIGNIFICANT CHANGE UP (ref 20.5–51.1)
LYMPHOCYTES # BLD AUTO: 6.1 % — LOW (ref 20.5–51.1)
LYMPHOCYTES # BLD AUTO: 7.2 % — LOW (ref 20.5–51.1)
LYMPHOCYTES # FLD: SIGNIFICANT CHANGE UP
MACROCYTES BLD QL: SLIGHT — SIGNIFICANT CHANGE UP
MAGNESIUM SERPL-MCNC: 1.6 MG/DL
MAGNESIUM SERPL-MCNC: 1.7 MG/DL — LOW (ref 1.8–2.4)
MAGNESIUM SERPL-MCNC: 1.8 MG/DL — SIGNIFICANT CHANGE UP (ref 1.8–2.4)
MAGNESIUM SERPL-MCNC: 1.9 MG/DL — SIGNIFICANT CHANGE UP (ref 1.8–2.4)
MAGNESIUM SERPL-MCNC: 2 MG/DL
MAGNESIUM SERPL-MCNC: 2 MG/DL — SIGNIFICANT CHANGE UP (ref 1.8–2.4)
MAGNESIUM SERPL-MCNC: 2.1 MG/DL — SIGNIFICANT CHANGE UP (ref 1.8–2.4)
MAGNESIUM SERPL-MCNC: 2.2 MG/DL — SIGNIFICANT CHANGE UP (ref 1.8–2.4)
MAGNESIUM SERPL-MCNC: 2.2 MG/DL — SIGNIFICANT CHANGE UP (ref 1.8–2.4)
MANUAL SMEAR VERIFICATION: SIGNIFICANT CHANGE UP
MCHC RBC-ENTMCNC: 27.7 PG — SIGNIFICANT CHANGE UP (ref 27–31)
MCHC RBC-ENTMCNC: 27.8 PG — SIGNIFICANT CHANGE UP (ref 27–31)
MCHC RBC-ENTMCNC: 28.3 PG — SIGNIFICANT CHANGE UP (ref 27–31)
MCHC RBC-ENTMCNC: 28.4 PG — SIGNIFICANT CHANGE UP (ref 27–31)
MCHC RBC-ENTMCNC: 29.2 PG
MCHC RBC-ENTMCNC: 29.8 PG
MCHC RBC-ENTMCNC: 30 PG — SIGNIFICANT CHANGE UP (ref 27–31)
MCHC RBC-ENTMCNC: 30.5 PG
MCHC RBC-ENTMCNC: 30.5 PG
MCHC RBC-ENTMCNC: 30.6 PG
MCHC RBC-ENTMCNC: 30.6 PG
MCHC RBC-ENTMCNC: 30.7 PG — SIGNIFICANT CHANGE UP (ref 27–31)
MCHC RBC-ENTMCNC: 30.7 PG — SIGNIFICANT CHANGE UP (ref 27–31)
MCHC RBC-ENTMCNC: 30.8 PG — SIGNIFICANT CHANGE UP (ref 27–31)
MCHC RBC-ENTMCNC: 30.9 PG
MCHC RBC-ENTMCNC: 31 PG
MCHC RBC-ENTMCNC: 31 PG — SIGNIFICANT CHANGE UP (ref 27–31)
MCHC RBC-ENTMCNC: 31.2 PG — HIGH (ref 27–31)
MCHC RBC-ENTMCNC: 31.3 PG
MCHC RBC-ENTMCNC: 31.4 PG — HIGH (ref 27–31)
MCHC RBC-ENTMCNC: 31.7 G/DL
MCHC RBC-ENTMCNC: 32.9 G/DL — SIGNIFICANT CHANGE UP (ref 32–37)
MCHC RBC-ENTMCNC: 33 G/DL — SIGNIFICANT CHANGE UP (ref 32–37)
MCHC RBC-ENTMCNC: 33 G/DL — SIGNIFICANT CHANGE UP (ref 32–37)
MCHC RBC-ENTMCNC: 33.2 G/DL — SIGNIFICANT CHANGE UP (ref 32–37)
MCHC RBC-ENTMCNC: 33.3 G/DL
MCHC RBC-ENTMCNC: 33.3 G/DL
MCHC RBC-ENTMCNC: 33.3 G/DL — SIGNIFICANT CHANGE UP (ref 32–37)
MCHC RBC-ENTMCNC: 33.8 G/DL — SIGNIFICANT CHANGE UP (ref 32–37)
MCHC RBC-ENTMCNC: 33.9 G/DL
MCHC RBC-ENTMCNC: 33.9 G/DL — SIGNIFICANT CHANGE UP (ref 32–37)
MCHC RBC-ENTMCNC: 33.9 G/DL — SIGNIFICANT CHANGE UP (ref 32–37)
MCHC RBC-ENTMCNC: 34 G/DL
MCHC RBC-ENTMCNC: 34.1 G/DL
MCHC RBC-ENTMCNC: 34.1 G/DL — SIGNIFICANT CHANGE UP (ref 32–37)
MCHC RBC-ENTMCNC: 34.2 G/DL
MCHC RBC-ENTMCNC: 34.4 G/DL
MCHC RBC-ENTMCNC: 34.6 G/DL — SIGNIFICANT CHANGE UP (ref 32–37)
MCHC RBC-ENTMCNC: 34.7 G/DL — SIGNIFICANT CHANGE UP (ref 32–37)
MCHC RBC-ENTMCNC: 35 G/DL
MCV RBC AUTO: 81.8 FL — SIGNIFICANT CHANGE UP (ref 81–99)
MCV RBC AUTO: 82 FL — SIGNIFICANT CHANGE UP (ref 81–99)
MCV RBC AUTO: 83.3 FL
MCV RBC AUTO: 88.6 FL
MCV RBC AUTO: 89.5 FL
MCV RBC AUTO: 89.7 FL
MCV RBC AUTO: 90.1 FL
MCV RBC AUTO: 90.4 FL
MCV RBC AUTO: 91.1 FL
MCV RBC AUTO: 91.4 FL — SIGNIFICANT CHANGE UP (ref 81–99)
MCV RBC AUTO: 91.7 FL — SIGNIFICANT CHANGE UP (ref 81–99)
MCV RBC AUTO: 91.9 FL
MCV RBC AUTO: 92.1 FL — SIGNIFICANT CHANGE UP (ref 81–99)
MCV RBC AUTO: 92.3 FL — SIGNIFICANT CHANGE UP (ref 81–99)
MCV RBC AUTO: 93.1 FL — SIGNIFICANT CHANGE UP (ref 81–99)
MCV RBC AUTO: 93.2 FL — SIGNIFICANT CHANGE UP (ref 81–99)
MCV RBC AUTO: 94 FL — SIGNIFICANT CHANGE UP (ref 81–99)
MCV RBC AUTO: 98.8 FL
MESOTHL CELL # FLD: SIGNIFICANT CHANGE UP %
METAMYELOCYTES # FLD: 0.9 % — HIGH (ref 0–0)
MICROCYTES BLD QL: SLIGHT — SIGNIFICANT CHANGE UP
MONOCYTES # BLD AUTO: 0.19 K/UL — SIGNIFICANT CHANGE UP (ref 0.1–0.6)
MONOCYTES # BLD AUTO: 0.2 K/UL — SIGNIFICANT CHANGE UP (ref 0.1–0.6)
MONOCYTES # BLD AUTO: 0.29 K/UL — SIGNIFICANT CHANGE UP (ref 0.1–0.6)
MONOCYTES # BLD AUTO: 0.71 K/UL — HIGH (ref 0.1–0.6)
MONOCYTES # BLD AUTO: 0.76 K/UL — HIGH (ref 0.1–0.6)
MONOCYTES # BLD AUTO: 0.78 K/UL — HIGH (ref 0.1–0.6)
MONOCYTES # BLD AUTO: 1.37 K/UL — HIGH (ref 0.1–0.6)
MONOCYTES NFR BLD AUTO: 11 % — HIGH (ref 1.7–9.3)
MONOCYTES NFR BLD AUTO: 12.8 % — HIGH (ref 1.7–9.3)
MONOCYTES NFR BLD AUTO: 3.6 % — SIGNIFICANT CHANGE UP (ref 1.7–9.3)
MONOCYTES NFR BLD AUTO: 3.6 % — SIGNIFICANT CHANGE UP (ref 1.7–9.3)
MONOCYTES NFR BLD AUTO: 31.7 % — HIGH (ref 1.7–9.3)
MONOCYTES NFR BLD AUTO: 7.8 % — SIGNIFICANT CHANGE UP (ref 1.7–9.3)
MONOCYTES NFR BLD AUTO: 8.8 % — SIGNIFICANT CHANGE UP (ref 1.7–9.3)
MONOS+MACROS # FLD: SIGNIFICANT CHANGE UP %
NEUTROPHILS # BLD AUTO: 1.82 K/UL — SIGNIFICANT CHANGE UP (ref 1.4–6.5)
NEUTROPHILS # BLD AUTO: 2.76 K/UL — SIGNIFICANT CHANGE UP (ref 1.4–6.5)
NEUTROPHILS # BLD AUTO: 3.93 K/UL — SIGNIFICANT CHANGE UP (ref 1.4–6.5)
NEUTROPHILS # BLD AUTO: 4.07 K/UL — SIGNIFICANT CHANGE UP (ref 1.4–6.5)
NEUTROPHILS # BLD AUTO: 4.66 K/UL — SIGNIFICANT CHANGE UP (ref 1.4–6.5)
NEUTROPHILS # BLD AUTO: 4.96 K/UL — SIGNIFICANT CHANGE UP (ref 1.4–6.5)
NEUTROPHILS # BLD AUTO: 6.69 K/UL — HIGH (ref 1.4–6.5)
NEUTROPHILS NFR BLD AUTO: 42.2 % — SIGNIFICANT CHANGE UP (ref 42.2–75.2)
NEUTROPHILS NFR BLD AUTO: 68.6 % — SIGNIFICANT CHANGE UP (ref 42.2–75.2)
NEUTROPHILS NFR BLD AUTO: 70.4 % — SIGNIFICANT CHANGE UP (ref 42.2–75.2)
NEUTROPHILS NFR BLD AUTO: 70.5 % — SIGNIFICANT CHANGE UP (ref 42.2–75.2)
NEUTROPHILS NFR BLD AUTO: 74.6 % — SIGNIFICANT CHANGE UP (ref 42.2–75.2)
NEUTROPHILS NFR BLD AUTO: 82.5 % — HIGH (ref 42.2–75.2)
NEUTROPHILS NFR BLD AUTO: 89.2 % — HIGH (ref 42.2–75.2)
NEUTS HYPERSEG # BLD: PRESENT — SIGNIFICANT CHANGE UP
NON-GYNECOLOGICAL CYTOLOGY STUDY: SIGNIFICANT CHANGE UP
NRBC # BLD: 0 /100 WBCS — SIGNIFICANT CHANGE UP (ref 0–0)
NRBC # BLD: 1 /100 — HIGH (ref 0–0)
NRBC # BLD: SIGNIFICANT CHANGE UP /100 WBCS (ref 0–0)
NT-PROBNP SERPL-SCNC: 797 PG/ML — HIGH (ref 0–300)
PHOSPHATE SERPL-MCNC: 2.7 MG/DL — SIGNIFICANT CHANGE UP (ref 2.1–4.9)
PHOSPHATE SERPL-MCNC: 3 MG/DL — SIGNIFICANT CHANGE UP (ref 2.1–4.9)
PHOSPHATE SERPL-MCNC: 3.1 MG/DL — SIGNIFICANT CHANGE UP (ref 2.1–4.9)
PLAT MORPH BLD: NORMAL — SIGNIFICANT CHANGE UP
PLATELET # BLD AUTO: 105 K/UL — LOW (ref 130–400)
PLATELET # BLD AUTO: 141 K/UL
PLATELET # BLD AUTO: 147 K/UL
PLATELET # BLD AUTO: 152 K/UL — SIGNIFICANT CHANGE UP (ref 130–400)
PLATELET # BLD AUTO: 159 K/UL
PLATELET # BLD AUTO: 161 K/UL — SIGNIFICANT CHANGE UP (ref 130–400)
PLATELET # BLD AUTO: 193 K/UL
PLATELET # BLD AUTO: 20 K/UL — LOW (ref 130–400)
PLATELET # BLD AUTO: 214 K/UL
PLATELET # BLD AUTO: 23 K/UL — LOW (ref 130–400)
PLATELET # BLD AUTO: 232 K/UL
PLATELET # BLD AUTO: 275 K/UL — SIGNIFICANT CHANGE UP (ref 130–400)
PLATELET # BLD AUTO: 28 K/UL — LOW (ref 130–400)
PLATELET # BLD AUTO: 297 K/UL
PLATELET # BLD AUTO: 31 K/UL — LOW (ref 130–400)
PLATELET # BLD AUTO: 34 K/UL
PLATELET # BLD AUTO: 47 K/UL — LOW (ref 130–400)
PLATELET # BLD AUTO: 56 K/UL
PLATELET # BLD AUTO: 58 K/UL — LOW (ref 130–400)
PLATELET # BLD AUTO: 89 K/UL — LOW (ref 130–400)
PMV BLD: 10.2 FL
PMV BLD: 10.5 FL
PMV BLD: 10.6 FL
PMV BLD: 10.7 FL
PMV BLD: 9.1 FL
PMV BLD: 9.6 FL
PMV BLD: 9.8 FL
PMV BLD: 9.8 FL
PMV BLD: 9.9 FL
POIKILOCYTOSIS BLD QL AUTO: SIGNIFICANT CHANGE UP
POIKILOCYTOSIS BLD QL AUTO: SIGNIFICANT CHANGE UP
POLYCHROMASIA BLD QL SMEAR: SIGNIFICANT CHANGE UP
POLYCHROMASIA BLD QL SMEAR: SIGNIFICANT CHANGE UP
POLYCHROMASIA BLD QL SMEAR: SLIGHT — SIGNIFICANT CHANGE UP
POTASSIUM SERPL-MCNC: 2.8 MMOL/L — LOW (ref 3.5–5)
POTASSIUM SERPL-MCNC: 2.9 MMOL/L — LOW (ref 3.5–5)
POTASSIUM SERPL-MCNC: 2.9 MMOL/L — LOW (ref 3.5–5)
POTASSIUM SERPL-MCNC: 3.2 MMOL/L — LOW (ref 3.5–5)
POTASSIUM SERPL-MCNC: 3.3 MMOL/L — LOW (ref 3.5–5)
POTASSIUM SERPL-MCNC: 3.6 MMOL/L — SIGNIFICANT CHANGE UP (ref 3.5–5)
POTASSIUM SERPL-MCNC: 3.7 MMOL/L — SIGNIFICANT CHANGE UP (ref 3.5–5)
POTASSIUM SERPL-MCNC: 3.9 MMOL/L — SIGNIFICANT CHANGE UP (ref 3.5–5)
POTASSIUM SERPL-MCNC: 4.1 MMOL/L — SIGNIFICANT CHANGE UP (ref 3.5–5)
POTASSIUM SERPL-MCNC: 4.1 MMOL/L — SIGNIFICANT CHANGE UP (ref 3.5–5)
POTASSIUM SERPL-SCNC: 2.8 MMOL/L — LOW (ref 3.5–5)
POTASSIUM SERPL-SCNC: 2.9 MMOL/L — LOW (ref 3.5–5)
POTASSIUM SERPL-SCNC: 2.9 MMOL/L — LOW (ref 3.5–5)
POTASSIUM SERPL-SCNC: 3 MMOL/L
POTASSIUM SERPL-SCNC: 3.2 MMOL/L — LOW (ref 3.5–5)
POTASSIUM SERPL-SCNC: 3.3 MMOL/L — LOW (ref 3.5–5)
POTASSIUM SERPL-SCNC: 3.6 MMOL/L — SIGNIFICANT CHANGE UP (ref 3.5–5)
POTASSIUM SERPL-SCNC: 3.7 MMOL/L — SIGNIFICANT CHANGE UP (ref 3.5–5)
POTASSIUM SERPL-SCNC: 3.8 MMOL/L
POTASSIUM SERPL-SCNC: 3.9 MMOL/L — SIGNIFICANT CHANGE UP (ref 3.5–5)
POTASSIUM SERPL-SCNC: 4 MMOL/L
POTASSIUM SERPL-SCNC: 4.1 MMOL/L
POTASSIUM SERPL-SCNC: 4.1 MMOL/L — SIGNIFICANT CHANGE UP (ref 3.5–5)
POTASSIUM SERPL-SCNC: 4.1 MMOL/L — SIGNIFICANT CHANGE UP (ref 3.5–5)
PROT FLD-MCNC: 1.2 G/DL — SIGNIFICANT CHANGE UP
PROT SERPL-MCNC: 4.1 G/DL — LOW (ref 6–8)
PROT SERPL-MCNC: 4.4 G/DL — LOW (ref 6–8)
PROT SERPL-MCNC: 4.5 G/DL
PROT SERPL-MCNC: 4.5 G/DL — LOW (ref 6–8)
PROT SERPL-MCNC: 4.7 G/DL — LOW (ref 6–8)
PROT SERPL-MCNC: 4.7 G/DL — LOW (ref 6–8)
PROT SERPL-MCNC: 5 G/DL
PROT SERPL-MCNC: 5.6 G/DL — LOW (ref 6–8)
PROT SERPL-MCNC: 5.7 G/DL
PROT SERPL-MCNC: 5.8 G/DL
PROT SERPL-MCNC: 5.8 G/DL — LOW (ref 6–8)
PROT SERPL-MCNC: 5.8 G/DL — LOW (ref 6–8)
PROT SERPL-MCNC: 5.9 G/DL — LOW (ref 6–8)
PROT SERPL-MCNC: 6.4 G/DL — SIGNIFICANT CHANGE UP (ref 6–8)
PROTHROM AB SERPL-ACNC: 15.7 SEC — HIGH (ref 9.95–12.87)
PROTHROM AB SERPL-ACNC: 15.8 SEC — HIGH (ref 9.95–12.87)
RBC # BLD: 2.42 M/UL — LOW (ref 4.2–5.4)
RBC # BLD: 2.66 M/UL — LOW (ref 4.2–5.4)
RBC # BLD: 2.86 M/UL — LOW (ref 4.2–5.4)
RBC # BLD: 2.93 M/UL — LOW (ref 4.2–5.4)
RBC # BLD: 3.03 M/UL — LOW (ref 4.2–5.4)
RBC # BLD: 3.03 M/UL — LOW (ref 4.2–5.4)
RBC # BLD: 3.14 M/UL
RBC # BLD: 3.16 M/UL
RBC # BLD: 3.33 M/UL
RBC # BLD: 3.42 M/UL
RBC # BLD: 3.61 M/UL
RBC # BLD: 3.62 M/UL
RBC # BLD: 4.07 M/UL — LOW (ref 4.2–5.4)
RBC # BLD: 4.16 M/UL
RBC # BLD: 4.18 M/UL
RBC # BLD: 4.22 M/UL — SIGNIFICANT CHANGE UP (ref 4.2–5.4)
RBC # BLD: 4.25 M/UL
RBC # BLD: 4.4 M/UL — SIGNIFICANT CHANGE UP (ref 4.2–5.4)
RBC # BLD: 4.67 M/UL — SIGNIFICANT CHANGE UP (ref 4.2–5.4)
RBC # FLD: 16 % — HIGH (ref 11.5–14.5)
RBC # FLD: 16.1 %
RBC # FLD: 16.2 % — HIGH (ref 11.5–14.5)
RBC # FLD: 16.8 % — HIGH (ref 11.5–14.5)
RBC # FLD: 17 %
RBC # FLD: 17 % — HIGH (ref 11.5–14.5)
RBC # FLD: 17.2 %
RBC # FLD: 17.2 %
RBC # FLD: 17.2 % — HIGH (ref 11.5–14.5)
RBC # FLD: 17.3 %
RBC # FLD: 17.5 %
RBC # FLD: 17.8 % — HIGH (ref 11.5–14.5)
RBC # FLD: 17.9 % — HIGH (ref 11.5–14.5)
RBC # FLD: 17.9 % — HIGH (ref 11.5–14.5)
RBC # FLD: 18.3 %
RBC # FLD: 18.9 % — HIGH (ref 11.5–14.5)
RBC # FLD: 19.3 %
RBC # FLD: 19.4 % — HIGH (ref 11.5–14.5)
RBC # FLD: 20.3 % — HIGH (ref 11.5–14.5)
RBC # FLD: 21.6 %
RBC BLD AUTO: ABNORMAL
RCV VOL RI: 1000 /UL — HIGH (ref 0–0)
RETICS #: 102.1 K/UL — SIGNIFICANT CHANGE UP (ref 25–125)
RETICS/RBC NFR: 4.2 % — HIGH (ref 0.5–1.5)
SARS-COV-2 IGG+IGM SERPL QL IA: >250 U/ML — HIGH
SARS-COV-2 IGG+IGM SERPL QL IA: POSITIVE
SARS-COV-2 RNA SPEC QL NAA+PROBE: SIGNIFICANT CHANGE UP
SMUDGE CELLS # BLD: PRESENT — SIGNIFICANT CHANGE UP
SODIUM SERPL-SCNC: 129 MMOL/L — LOW (ref 135–146)
SODIUM SERPL-SCNC: 133 MMOL/L
SODIUM SERPL-SCNC: 133 MMOL/L
SODIUM SERPL-SCNC: 134 MMOL/L — LOW (ref 135–146)
SODIUM SERPL-SCNC: 134 MMOL/L — LOW (ref 135–146)
SODIUM SERPL-SCNC: 135 MMOL/L — SIGNIFICANT CHANGE UP (ref 135–146)
SODIUM SERPL-SCNC: 136 MMOL/L
SODIUM SERPL-SCNC: 137 MMOL/L
SODIUM SERPL-SCNC: 138 MMOL/L — SIGNIFICANT CHANGE UP (ref 135–146)
SODIUM SERPL-SCNC: 139 MMOL/L — SIGNIFICANT CHANGE UP (ref 135–146)
SODIUM SERPL-SCNC: 140 MMOL/L — SIGNIFICANT CHANGE UP (ref 135–146)
SPECIMEN SOURCE: SIGNIFICANT CHANGE UP
SPECIMEN SOURCE: SIGNIFICANT CHANGE UP
SURGICAL PATHOLOGY STUDY: SIGNIFICANT CHANGE UP
SURGICAL PATHOLOGY STUDY: SIGNIFICANT CHANGE UP
TOTAL NUCLEATED CELL COUNT, BODY FLUID: 124 /UL — SIGNIFICANT CHANGE UP
TROPONIN T SERPL-MCNC: <0.01 NG/ML — SIGNIFICANT CHANGE UP
TROPONIN T SERPL-MCNC: <0.01 NG/ML — SIGNIFICANT CHANGE UP
TUBE TYPE: SIGNIFICANT CHANGE UP
VARIANT LYMPHS # BLD: 0.9 % — SIGNIFICANT CHANGE UP (ref 0–5)
VARIANT LYMPHS # BLD: 1.7 % — SIGNIFICANT CHANGE UP (ref 0–5)
VIT B12 SERPL-MCNC: 484 PG/ML — SIGNIFICANT CHANGE UP (ref 232–1245)
WBC # BLD: 10.71 K/UL — SIGNIFICANT CHANGE UP (ref 4.8–10.8)
WBC # BLD: 12 K/UL — HIGH (ref 4.8–10.8)
WBC # BLD: 3.7 K/UL — LOW (ref 4.8–10.8)
WBC # BLD: 4.32 K/UL — LOW (ref 4.8–10.8)
WBC # BLD: 5.22 K/UL — SIGNIFICANT CHANGE UP (ref 4.8–10.8)
WBC # BLD: 5.44 K/UL — SIGNIFICANT CHANGE UP (ref 4.8–10.8)
WBC # BLD: 5.57 K/UL — SIGNIFICANT CHANGE UP (ref 4.8–10.8)
WBC # BLD: 5.93 K/UL — SIGNIFICANT CHANGE UP (ref 4.8–10.8)
WBC # BLD: 7.06 K/UL — SIGNIFICANT CHANGE UP (ref 4.8–10.8)
WBC # BLD: 7.99 K/UL — SIGNIFICANT CHANGE UP (ref 4.8–10.8)
WBC # BLD: 8.11 K/UL — SIGNIFICANT CHANGE UP (ref 4.8–10.8)
WBC # FLD AUTO: 10.71 K/UL — SIGNIFICANT CHANGE UP (ref 4.8–10.8)
WBC # FLD AUTO: 11.42 K/UL
WBC # FLD AUTO: 11.74 K/UL
WBC # FLD AUTO: 12 K/UL — HIGH (ref 4.8–10.8)
WBC # FLD AUTO: 12.91 K/UL
WBC # FLD AUTO: 3.7 K/UL — LOW (ref 4.8–10.8)
WBC # FLD AUTO: 4.3 K/UL
WBC # FLD AUTO: 4.32 K/UL — LOW (ref 4.8–10.8)
WBC # FLD AUTO: 5.22 K/UL — SIGNIFICANT CHANGE UP (ref 4.8–10.8)
WBC # FLD AUTO: 5.44 K/UL — SIGNIFICANT CHANGE UP (ref 4.8–10.8)
WBC # FLD AUTO: 5.57 K/UL — SIGNIFICANT CHANGE UP (ref 4.8–10.8)
WBC # FLD AUTO: 5.93 K/UL — SIGNIFICANT CHANGE UP (ref 4.8–10.8)
WBC # FLD AUTO: 6.32 K/UL
WBC # FLD AUTO: 7.06 K/UL — SIGNIFICANT CHANGE UP (ref 4.8–10.8)
WBC # FLD AUTO: 7.8 K/UL
WBC # FLD AUTO: 7.95 K/UL
WBC # FLD AUTO: 7.99 K/UL — SIGNIFICANT CHANGE UP (ref 4.8–10.8)
WBC # FLD AUTO: 8.11 K/UL — SIGNIFICANT CHANGE UP (ref 4.8–10.8)
WBC # FLD AUTO: 8.79 K/UL
WBC # FLD AUTO: 9.55 K/UL

## 2021-01-01 PROCEDURE — 99239 HOSP IP/OBS DSCHRG MGMT >30: CPT

## 2021-01-01 PROCEDURE — 99223 1ST HOSP IP/OBS HIGH 75: CPT

## 2021-01-01 PROCEDURE — 99214 OFFICE O/P EST MOD 30 MIN: CPT

## 2021-01-01 PROCEDURE — 99233 SBSQ HOSP IP/OBS HIGH 50: CPT

## 2021-01-01 PROCEDURE — 36561 INSERT TUNNELED CV CATH: CPT

## 2021-01-01 PROCEDURE — 99443: CPT

## 2021-01-01 PROCEDURE — 99285 EMERGENCY DEPT VISIT HI MDM: CPT

## 2021-01-01 PROCEDURE — 88312 SPECIAL STAINS GROUP 1: CPT | Mod: 26

## 2021-01-01 PROCEDURE — 76937 US GUIDE VASCULAR ACCESS: CPT | Mod: 26

## 2021-01-01 PROCEDURE — 88173 CYTOPATH EVAL FNA REPORT: CPT | Mod: 26

## 2021-01-01 PROCEDURE — 93010 ELECTROCARDIOGRAM REPORT: CPT

## 2021-01-01 PROCEDURE — 99213 OFFICE O/P EST LOW 20 MIN: CPT

## 2021-01-01 PROCEDURE — 93970 EXTREMITY STUDY: CPT | Mod: 26

## 2021-01-01 PROCEDURE — 99284 EMERGENCY DEPT VISIT MOD MDM: CPT

## 2021-01-01 PROCEDURE — 88305 TISSUE EXAM BY PATHOLOGIST: CPT | Mod: 26

## 2021-01-01 PROCEDURE — 43238 EGD US FINE NEEDLE BX/ASPIR: CPT | Mod: XS

## 2021-01-01 PROCEDURE — 93010 ELECTROCARDIOGRAM REPORT: CPT | Mod: 76

## 2021-01-01 PROCEDURE — 74176 CT ABD & PELVIS W/O CONTRAST: CPT | Mod: 26

## 2021-01-01 PROCEDURE — 88172 CYTP DX EVAL FNA 1ST EA SITE: CPT | Mod: 26

## 2021-01-01 PROCEDURE — 88307 TISSUE EXAM BY PATHOLOGIST: CPT | Mod: 26

## 2021-01-01 PROCEDURE — 74177 CT ABD & PELVIS W/CONTRAST: CPT | Mod: 26

## 2021-01-01 PROCEDURE — 99204 OFFICE O/P NEW MOD 45 MIN: CPT

## 2021-01-01 PROCEDURE — 74174 CTA ABD&PLVS W/CONTRAST: CPT | Mod: 26,MA

## 2021-01-01 PROCEDURE — 43262 ENDO CHOLANGIOPANCREATOGRAPH: CPT | Mod: XU

## 2021-01-01 PROCEDURE — 43274 ERCP DUCT STENT PLACEMENT: CPT | Mod: XS

## 2021-01-01 PROCEDURE — 99232 SBSQ HOSP IP/OBS MODERATE 35: CPT

## 2021-01-01 PROCEDURE — 93971 EXTREMITY STUDY: CPT

## 2021-01-01 RX ORDER — ALBUMIN HUMAN 25 %
50 VIAL (ML) INTRAVENOUS ONCE
Refills: 0 | Status: COMPLETED | OUTPATIENT
Start: 2021-01-01 | End: 2021-01-01

## 2021-01-01 RX ORDER — POTASSIUM CHLORIDE 20 MEQ
1 PACKET (EA) ORAL
Qty: 4 | Refills: 0
Start: 2021-01-01 | End: 2021-01-01

## 2021-01-01 RX ORDER — ENOXAPARIN SODIUM 100 MG/ML
40 INJECTION SUBCUTANEOUS DAILY
Refills: 0 | Status: DISCONTINUED | OUTPATIENT
Start: 2021-01-01 | End: 2021-01-01

## 2021-01-01 RX ORDER — MAGNESIUM SULFATE 500 MG/ML
2 VIAL (ML) INJECTION ONCE
Refills: 0 | Status: COMPLETED | OUTPATIENT
Start: 2021-01-01 | End: 2021-01-01

## 2021-01-01 RX ORDER — DEXAMETHASONE 0.5 MG/5ML
12 ELIXIR ORAL ONCE
Refills: 0 | Status: COMPLETED | OUTPATIENT
Start: 2021-01-01 | End: 2021-01-01

## 2021-01-01 RX ORDER — POTASSIUM CHLORIDE 20 MEQ
20 PACKET (EA) ORAL ONCE
Refills: 0 | Status: DISCONTINUED | OUTPATIENT
Start: 2021-01-01 | End: 2021-01-01

## 2021-01-01 RX ORDER — LIPASE/PROTEASE/AMYLASE 16-48-48K
1 CAPSULE,DELAYED RELEASE (ENTERIC COATED) ORAL
Qty: 90 | Refills: 0
Start: 2021-01-01

## 2021-01-01 RX ORDER — IOHEXOL 300 MG/ML
30 INJECTION, SOLUTION INTRAVENOUS ONCE
Refills: 0 | Status: COMPLETED | OUTPATIENT
Start: 2021-01-01 | End: 2021-01-01

## 2021-01-01 RX ORDER — POTASSIUM CHLORIDE 20 MEQ
40 PACKET (EA) ORAL EVERY 4 HOURS
Refills: 0 | Status: DISCONTINUED | OUTPATIENT
Start: 2021-01-01 | End: 2021-01-01

## 2021-01-01 RX ORDER — PACLITAXEL 100 MG/20ML
170 INJECTION, POWDER, LYOPHILIZED, FOR SUSPENSION INTRAVENOUS ONCE
Refills: 0 | Status: COMPLETED | OUTPATIENT
Start: 2021-01-01 | End: 2021-01-01

## 2021-01-01 RX ORDER — HYDROCHLOROTHIAZIDE 12.5 MG/1
12.5 TABLET ORAL
Refills: 0 | Status: ACTIVE | COMMUNITY

## 2021-01-01 RX ORDER — POTASSIUM CHLORIDE 20 MEQ
10 PACKET (EA) ORAL
Refills: 0 | Status: DISCONTINUED | OUTPATIENT
Start: 2021-01-01 | End: 2021-01-01

## 2021-01-01 RX ORDER — GEMCITABINE 38 MG/ML
1770 INJECTION, SOLUTION INTRAVENOUS ONCE
Refills: 0 | Status: COMPLETED | OUTPATIENT
Start: 2021-01-01 | End: 2021-01-01

## 2021-01-01 RX ORDER — ALBUMIN HUMAN 25 %
50 VIAL (ML) INTRAVENOUS ONCE
Refills: 0 | Status: DISCONTINUED | OUTPATIENT
Start: 2021-01-01 | End: 2021-01-01

## 2021-01-01 RX ORDER — POTASSIUM CHLORIDE 20 MEQ
40 PACKET (EA) ORAL ONCE
Refills: 0 | Status: COMPLETED | OUTPATIENT
Start: 2021-01-01 | End: 2021-01-01

## 2021-01-01 RX ORDER — PANCRELIPASE 120000; 24000; 76000 [USP'U]/1; [USP'U]/1; [USP'U]/1
24000-76000 CAPSULE, DELAYED RELEASE PELLETS ORAL
Qty: 90 | Refills: 3 | Status: ACTIVE | COMMUNITY
Start: 2021-01-01 | End: 1900-01-01

## 2021-01-01 RX ORDER — SODIUM CHLORIDE 9 MG/ML
1000 INJECTION, SOLUTION INTRAVENOUS
Refills: 0 | Status: DISCONTINUED | OUTPATIENT
Start: 2021-01-01 | End: 2021-01-01

## 2021-01-01 RX ORDER — ONDANSETRON 8 MG/1
4 TABLET, FILM COATED ORAL ONCE
Refills: 0 | Status: DISCONTINUED | OUTPATIENT
Start: 2021-01-01 | End: 2021-01-01

## 2021-01-01 RX ORDER — POTASSIUM CHLORIDE 20 MEQ
20 PACKET (EA) ORAL
Refills: 0 | Status: COMPLETED | OUTPATIENT
Start: 2021-01-01 | End: 2021-01-01

## 2021-01-01 RX ORDER — FUROSEMIDE 40 MG
40 TABLET ORAL DAILY
Refills: 0 | Status: DISCONTINUED | OUTPATIENT
Start: 2021-01-01 | End: 2021-01-01

## 2021-01-01 RX ORDER — APIXABAN 2.5 MG/1
1 TABLET, FILM COATED ORAL
Qty: 60 | Refills: 0
Start: 2021-01-01 | End: 2022-01-01

## 2021-01-01 RX ORDER — SPIRONOLACTONE 25 MG/1
50 TABLET, FILM COATED ORAL DAILY
Refills: 0 | Status: DISCONTINUED | OUTPATIENT
Start: 2021-01-01 | End: 2021-01-01

## 2021-01-01 RX ORDER — POTASSIUM CHLORIDE 20 MEQ
20 PACKET (EA) ORAL ONCE
Refills: 0 | Status: COMPLETED | OUTPATIENT
Start: 2021-01-01 | End: 2021-01-01

## 2021-01-01 RX ORDER — SODIUM CHLORIDE 9 MG/ML
1000 INJECTION INTRAMUSCULAR; INTRAVENOUS; SUBCUTANEOUS ONCE
Refills: 0 | Status: COMPLETED | OUTPATIENT
Start: 2021-01-01 | End: 2021-01-01

## 2021-01-01 RX ORDER — HYDROMORPHONE HYDROCHLORIDE 2 MG/ML
0.5 INJECTION INTRAMUSCULAR; INTRAVENOUS; SUBCUTANEOUS
Refills: 0 | Status: DISCONTINUED | OUTPATIENT
Start: 2021-01-01 | End: 2021-01-01

## 2021-01-01 RX ORDER — SPIRONOLACTONE 25 MG/1
1 TABLET, FILM COATED ORAL
Qty: 30 | Refills: 0
Start: 2021-01-01 | End: 2022-01-01

## 2021-01-01 RX ORDER — PACLITAXEL 100 MG/20ML
154 INJECTION, POWDER, LYOPHILIZED, FOR SUSPENSION INTRAVENOUS ONCE
Refills: 0 | Status: COMPLETED | OUTPATIENT
Start: 2021-01-01 | End: 2021-01-01

## 2021-01-01 RX ORDER — GEMCITABINE 38 MG/ML
1540 INJECTION, SOLUTION INTRAVENOUS ONCE
Refills: 0 | Status: COMPLETED | OUTPATIENT
Start: 2021-01-01 | End: 2021-01-01

## 2021-01-01 RX ORDER — GEMCITABINE 38 MG/ML
1690 INJECTION, SOLUTION INTRAVENOUS ONCE
Refills: 0 | Status: COMPLETED | OUTPATIENT
Start: 2021-01-01 | End: 2021-01-01

## 2021-01-01 RX ORDER — CIPROFLOXACIN HYDROCHLORIDE 500 MG/1
500 TABLET, FILM COATED ORAL
Qty: 20 | Refills: 0 | Status: COMPLETED | COMMUNITY
Start: 2021-01-01 | End: 2021-01-01

## 2021-01-01 RX ORDER — FUROSEMIDE 40 MG
1 TABLET ORAL
Qty: 30 | Refills: 0
Start: 2021-01-01 | End: 2022-01-01

## 2021-01-01 RX ORDER — LIPASE/PROTEASE/AMYLASE 16-48-48K
1 CAPSULE,DELAYED RELEASE (ENTERIC COATED) ORAL
Refills: 0 | Status: DISCONTINUED | OUTPATIENT
Start: 2021-01-01 | End: 2021-01-01

## 2021-01-01 RX ORDER — FUROSEMIDE 40 MG
1 TABLET ORAL
Qty: 0 | Refills: 0 | DISCHARGE

## 2021-01-01 RX ORDER — HYDROMORPHONE HYDROCHLORIDE 2 MG/ML
1 INJECTION INTRAMUSCULAR; INTRAVENOUS; SUBCUTANEOUS
Refills: 0 | Status: DISCONTINUED | OUTPATIENT
Start: 2021-01-01 | End: 2021-01-01

## 2021-01-01 RX ADMIN — Medication 40 MILLIEQUIVALENT(S): at 19:37

## 2021-01-01 RX ADMIN — Medication 122 MILLIGRAM(S): at 13:56

## 2021-01-01 RX ADMIN — Medication 50 MILLIEQUIVALENT(S): at 21:14

## 2021-01-01 RX ADMIN — IOHEXOL 30 MILLILITER(S): 300 INJECTION, SOLUTION INTRAVENOUS at 13:40

## 2021-01-01 RX ADMIN — PACLITAXEL 61.6 MILLIGRAM(S): 100 INJECTION, POWDER, LYOPHILIZED, FOR SUSPENSION INTRAVENOUS at 15:11

## 2021-01-01 RX ADMIN — Medication 1 CAPSULE(S): at 08:16

## 2021-01-01 RX ADMIN — Medication 1 CAPSULE(S): at 08:20

## 2021-01-01 RX ADMIN — Medication 40 MILLIEQUIVALENT(S): at 19:48

## 2021-01-01 RX ADMIN — GEMCITABINE 395.44 MILLIGRAM(S): 38 INJECTION, SOLUTION INTRAVENOUS at 16:27

## 2021-01-01 RX ADMIN — Medication 200 GRAM(S): at 15:20

## 2021-01-01 RX ADMIN — Medication 40 MILLIEQUIVALENT(S): at 22:33

## 2021-01-01 RX ADMIN — Medication 1 CAPSULE(S): at 11:43

## 2021-01-01 RX ADMIN — SODIUM CHLORIDE 125 MILLILITER(S): 9 INJECTION, SOLUTION INTRAVENOUS at 15:11

## 2021-01-01 RX ADMIN — Medication 40 MILLIGRAM(S): at 05:22

## 2021-01-01 RX ADMIN — Medication 1 CAPSULE(S): at 13:18

## 2021-01-01 RX ADMIN — GEMCITABINE 392.63 MILLIGRAM(S): 38 INJECTION, SOLUTION INTRAVENOUS at 12:02

## 2021-01-01 RX ADMIN — Medication 50 MILLIEQUIVALENT(S): at 17:44

## 2021-01-01 RX ADMIN — Medication 40 MILLIGRAM(S): at 06:23

## 2021-01-01 RX ADMIN — GEMCITABINE 395.44 MILLIGRAM(S): 38 INJECTION, SOLUTION INTRAVENOUS at 16:46

## 2021-01-01 RX ADMIN — Medication 25 GRAM(S): at 17:39

## 2021-01-01 RX ADMIN — Medication 1 CAPSULE(S): at 11:11

## 2021-01-01 RX ADMIN — Medication 1 CAPSULE(S): at 08:44

## 2021-01-01 RX ADMIN — Medication 122 MILLIGRAM(S): at 16:00

## 2021-01-01 RX ADMIN — PACLITAXEL 68 MILLIGRAM(S): 100 INJECTION, POWDER, LYOPHILIZED, FOR SUSPENSION INTRAVENOUS at 12:01

## 2021-01-01 RX ADMIN — GEMCITABINE 395.44 MILLIGRAM(S): 38 INJECTION, SOLUTION INTRAVENOUS at 16:58

## 2021-01-01 RX ADMIN — Medication 12 MILLIGRAM(S): at 16:15

## 2021-01-01 RX ADMIN — Medication 122 MILLIGRAM(S): at 16:57

## 2021-01-01 RX ADMIN — Medication 1 CAPSULE(S): at 17:33

## 2021-01-01 RX ADMIN — Medication 50 MILLIEQUIVALENT(S): at 19:50

## 2021-01-01 RX ADMIN — SPIRONOLACTONE 50 MILLIGRAM(S): 25 TABLET, FILM COATED ORAL at 17:44

## 2021-01-01 RX ADMIN — SODIUM CHLORIDE 1000 MILLILITER(S): 9 INJECTION INTRAMUSCULAR; INTRAVENOUS; SUBCUTANEOUS at 19:03

## 2021-01-01 RX ADMIN — GEMCITABINE 387.37 MILLIGRAM(S): 38 INJECTION, SOLUTION INTRAVENOUS at 15:10

## 2021-01-01 RX ADMIN — Medication 50 GRAM(S): at 11:42

## 2021-01-01 RX ADMIN — Medication 122 MILLIGRAM(S): at 16:45

## 2021-01-01 RX ADMIN — SPIRONOLACTONE 50 MILLIGRAM(S): 25 TABLET, FILM COATED ORAL at 05:22

## 2021-01-01 RX ADMIN — Medication 50 MILLIEQUIVALENT(S): at 20:40

## 2021-01-01 RX ADMIN — IOHEXOL 30 MILLILITER(S): 300 INJECTION, SOLUTION INTRAVENOUS at 20:39

## 2021-01-01 RX ADMIN — Medication 40 MILLIEQUIVALENT(S): at 20:39

## 2021-01-01 RX ADMIN — Medication 122 MILLIGRAM(S): at 11:29

## 2021-06-09 PROBLEM — I10 ESSENTIAL (PRIMARY) HYPERTENSION: Chronic | Status: ACTIVE | Noted: 2021-01-01

## 2021-06-18 PROBLEM — R93.3 ABNORMAL CT SCAN, COLON: Status: ACTIVE | Noted: 2021-01-01

## 2021-06-18 PROBLEM — Z78.9 DOES NOT USE TOBACCO: Status: ACTIVE | Noted: 2021-01-01

## 2021-06-18 PROBLEM — Z86.79 HISTORY OF HYPERTENSION: Status: RESOLVED | Noted: 2021-01-01 | Resolved: 2021-01-01

## 2021-06-18 PROBLEM — Z78.9 DENIES ALCOHOL CONSUMPTION: Status: ACTIVE | Noted: 2021-01-01

## 2021-06-18 PROBLEM — Z80.0 FAMILY HISTORY OF MALIGNANT NEOPLASM OF STOMACH: Status: ACTIVE | Noted: 2021-01-01

## 2021-06-18 NOTE — HISTORY OF PRESENT ILLNESS
[de-identified] : Patient is a 60 y/o with PMHx of HTN whom was referred for EGD and EUS. Patient had Pancreatic Mass noted on prior EUS which was sampled but no tissue. PAtient overall feels well.

## 2021-06-18 NOTE — ASSESSMENT
[FreeTextEntry1] : Patient is a 62 y/o with PMHx of HTN whom was referred for EGD and EUS. Patient had Pancreatic Mass noted on prior EUS which was sampled but no tissue. PAtient overall feels well. \par \par Pancreatic Mass\par Discussed EGD and EUS with patient\par Procedure planned for this Monday

## 2021-06-21 NOTE — ASU PATIENT PROFILE, ADULT - MEDICATION HERBAL REMEDIES, PROFILE
no 14.7   11.4  )-----------( 262      ( 10 Carlo 2018 18:38 )             44.8     PT/INR - ( 10 Carlo 2018 18:38 )   PT: 11.5 sec;   INR: 1.04          PTT - ( 10 Carlo 2018 18:38 )  PTT:29.4 sec  06-10    144  |  105  |  30<H>  ----------------------------<  126<H>  4.6   |  23  |  0.63    Ca    10.1      10 Carlo 2018 18:38    TPro  8.0  /  Alb  4.6  /  TBili  0.4  /  DBili  x   /  AST  21  /  ALT  22  /  AlkPhos  82  06-10

## 2021-06-21 NOTE — CHART NOTE - NSCHARTNOTEFT_GEN_A_CORE
PACU ANESTHESIA ADMISSION NOTE      Procedure:   Post op diagnosis:      ____  Intubated  TV:______       Rate: ______      FiO2: ______    _x___  Patent Airway    __x__  Full return of protective reflexes    _x___  Full recovery from anesthesia / back to baseline     Vitals:   T:           R: 11                 BP:  144/74                Sat:  99                 P: 84      Mental Status:  _x___ Awake   __x___ Alert   _____ Drowsy   _____ Sedated    Nausea/Vomiting:  __x__ NO  ______Yes,   See Post - Op Orders          Pain Scale (0-10):  __x___    Treatment: ____ None    ____ See Post - Op/PCA Orders    Post - Operative Fluids:   __x__ Oral   ____ See Post - Op Orders    Plan: Discharge:   _x___Home       _____Floor     _____Critical Care    _____  Other:_________________    Comments: tolerated procedure well

## 2021-06-21 NOTE — CHART NOTE - NSCHARTNOTEFT_GEN_A_CORE
Patient s/p EUS with FNA of Pancreatic followed by ERCP with sphincterotomy and Biliary stent placement.

## 2021-06-21 NOTE — ASU PREOP CHECKLIST - BP NONINVASIVE DIASTOLIC (MM HG)
[facility]    Palliative Care Consultation   Text Page    Date of Admission:  1/23/2018    Assessment & Plan   Kelly Nunez is a 67 year old female who was admitted on 1/23/2018. I was asked by Hospitalist Breana Gaines MD  to see the patient for goals of care, family request and family wants to be involved.    Recommendations:  1. Dementia - does not demonstrate medical capacity. Family is assisting in care plan.     2.  Dyspnea - Continues to improve.    3. Weakness - Appreciate input of Occupational Therapist Zana Marsh OT and Physical Therapist Marjan Cadena, PT.  Anna Arnold CM has seen regarding care transitions.     4. Anxiety - due to tobacco withdrawal and dyspnea. Continue restorative efforts. Avoid benzodiazepines.     Goals of Care: FULL CODE - Restorative care. Plan for TCU on dismissal.    Disease Process/es & Symptoms:  Kelly Nunez is a 67 year old patient admitted 1/23/2018 with symptoms of shortness of breath. Her medical history includes diabetes, dementia, CAD with previous PCI, tobacco abuse, but had not been diagnosed with COPD previously. She is being treated for acute respiratory failure due to COPD and pneumonia which she has been given steroids and IV antibiotics. She has also been found to be in Atrial fibrillation with rapid ventricular response and was started on a Cardizem drip. She has been seen in Cardiology consultation by Travis Villagran MD. Echo on 1/24/2018 demonstrates LV EF 55-60% with grade II moderate diastolic dysfunction with moderate to severe pulmonary hypertension.     The following symptoms are noted by patient as concerning to her quality of life.  Dyspnea - limited activity tolerance  Fatigue -   Anxiety - concerned about tolerating tobacco cessation    Psychosocial/Spiritual Needs:  Oriented to Spiritual Health and Social Work Services as part of Palliative Care team.    Decision-Making & Goals of Care:  Discussion/counseling  "today about goals of care/decisions:   Met with Kelly as she was sitting up in a chair. Her daughter, Brianna Talbert at bedside. Navya called her father as he requested to be present for our discussion and we arranged to meet at 1 PM.     I returned at 1 PM for a discussion with the patient, her  Kednall Nunez, and their daughter. Patient overwhelmed with the hospitalization and would like to go home. They have 3 cats (\"The boys\" at home are Stanford or Antionette, Stevan and, Sputter) that she would love to see. Her  and daughter are supportive of her efforts to recover and also acknowledge they have been supporting her tobacco habits which they will no longer allow. The patient has an Advanced Directive which I have obtained from their PCP office. The family is aware that she will not be able to return home until she has a safe plan. They are hopeful Kelly will regain strength at TCU and be able to return to taking vacations in their RV and enjoying concerts.     Patient has decision-making capacity Questionable  Patient has a Health Care Agent named in a legal Advance Directive document dated 11/17/2015. See name(s) and contact information in Health Care Agent/Legal Guardian section below.  Name: Kendall Nunez, Relationship: , Phone(s): [phone] or [phone].  First Alternate Name: Sal Nunez, Relationship: Son, Phone(s): [phone] or [phone].  Second Alternate Name: Brianna Talbert, Relationship: Daughter, Phone(s): [phone].    Patient has a completed health care directive available in the chart (Y/N): YES  Physician orders for life-sustaining treatment (POLST) form is not completed  Code Status: Full code     Findings & plan of care discussed with: Hospitalist Breana Gaines MD; bedside nurse Idalmis Wolff RN and Ruba Hagan LPN.  Follow-up plan from palliative team: Will plan to see on Monday if the patient should remain hospitalized for the weekend.   Thank you for " involving us in the patient's care.     Ara Cota MS, RN, CNS, APRN, ACHPN, FAACVPR  Pain and Palliative Care  Pager [phone]  Office [phone]     Time Spent on this Encounter   I spent from 11:15 until 11:30 AM minutes in assessment of the patient and discussion with the patient and family. Family care conference from 1 PM until 1:55 PM. Another 30 minutes in review of chart, documentation and discussion with the health care team.    Reason for Consult   Reason for consult: I was asked by Hospitalist Breana Gaines MD  to see the patient for goals of care, family request and family wants to be involved.    Primary Care Physician   Kenneth Amin    Chief Complaint   Low Oxygen Saturation and UTI    History is obtained from the patient, electronic health record, patient's daughter and patient's spouse    Past Medical History   I have reviewed this patient's medical history and updated it with pertinent information if needed.   Past Medical History:   Diagnosis Date     Dementia      Diabetes (H)        Past Surgical History   I have reviewed this patient's surgical history and updated it with pertinent information if needed.  Past Surgical History:   Procedure Laterality Date     HEART CATH, ANGIOPLASTY         Prior to Admission Medications   Prior to Admission Medications   Prescriptions Last Dose Informant Patient Reported? Taking?   ASPIRIN PO   Yes Yes   Sig: Take 81 mg by mouth daily   DULOXETINE HCL PO   Yes Yes   Sig: Take 120 mg by mouth daily   NIFEdipine ER (ADALAT CC) 30 MG TB24   Yes Yes   Sig: Take 30 mg by mouth 2 times daily   OLANZAPINE PO   Yes Yes   Sig: Take 10 mg by mouth At Bedtime   memantine XR (NAMENDA XR) 28 MG 24 hr capsule   Yes Yes   Sig: Take 28 mg by mouth daily      Facility-Administered Medications: None     Allergies   Allergies   Allergen Reactions     Penicillins Swelling     Pt states she has tolerated cephalosporins in the past.        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  History of substance use/abuse:  reports that she has been smoking.  She has been smoking about 1.00 pack per day. She has never used smokeless tobacco.  reports that she drinks alcohol.  [employer] affiliation: Undesignated    Family History   I have reviewed this patient's family history and updated it with pertinent information if needed.   No family history on file.    Review of Systems   The 10 point Review of Systems is negative other than noted in the HPI or here.     Palliative Symptom Review (0=no symptom/no concern, 1=mild, 2=moderate, 3=severe):      Pain: 0-none      Fatigue: 3-severe      Nausea: 0-none      Constipation: 0-none      Diarrhea: 0-none      Depressive Symptoms: 0-none      Anxiety: 2-moderate      Drowsiness: 1-mild      Poor Appetite: 2-moderate      Shortness of Breath: 1-mild      Insomnia: 0-none       Physical Exam   Temp:  [97.4  F (36.3  C)-98.5  F (36.9  C)] 97.7  F (36.5  C)  Heart Rate:  [] 99  Resp:  [18-20] 18  BP: (126-178)/() 131/74  SpO2:  [79 %-96 %] 96 %  92 lbs 0 oz  GEN:  Cachetic and frail  woman who looks older than documented age.Alert, oriented to self and situation, but does not recall recent events, appears comfortable.  HEENT:  Normocephalic/atraumatic, no scleral icterus, no nasal discharge, mouth moist.  CV:  RRR, S1, S2; no murmurs or other irregularities noted.  +3 DP/PT pulses bilaterally; no edema BLE.  RESP:  Bilateral wheeze.  Symmetric chest rise on inhalation noted.  Using accessory muscles to breath.  ABD:  Rounded, soft, non-tender/non-distended.  +BS  EXT:  CMS intact x 4.     M/S:   Denies  discomfort with palpation of extremities and spine.     SKIN:  Warm and dry to touch, no exanthems noted in the visualized areas.    NEURO: Symmetric strength +5/5.  Sensation to touch intact all extremities.   There is no area of allodynia or hyperesthesia.  Psych:  Intermittently anxious, but cooperative with interview. Unfortunately does not recall recent events or what her health problems are.      Data   Results for orders placed or performed during the hospital encounter of 01/23/18   XR Chest Port 1 View    Narrative    CHEST ONE VIEW PORTABLE    1/23/2018 3:55 PM     HISTORY: Chest pain/shortness of breath.    COMPARISON: None.      Impression    IMPRESSION: Pulmonary vasculature is indistinct suggestive of vascular  congestion. Micronodule airspace opacities at the left lung base  concerning for pneumonia versus atelectasis. Small bilateral pleural  effusions. Right apical pleural thickening. No definite pneumothorax.  Cardiac silhouette within normal limits.    MAR ALEXIS MD   Chest CT, IV contrast only - PE protocol    Narrative    CT CHEST WITH CONTRAST  1/23/2018  6:47 PM    HISTORY: Shortness of breath. Evaluate for pulmonary embolism.    COMPARISON: A radiograph earlier today.    TECHNIQUE: Routine transverse CT imaging of the chest was performed  following the uneventful intravenous administration of 46mL Isovue-370  contrast. A pulmonary embolism protocol was utilized. Radiation dose  for this scan was reduced using automated exposure control, adjustment  of the mA and/or kV according to patient size, or iterative  reconstruction technique.    FINDINGS: The heart size is normal. No enlarged lymph node or other  abnormal mediastinal mass is seen. There is calcification of the  thoracic aorta. There is very good opacification of the pulmonary  arteries with contrast. No pulmonary embolism is seen. Emphysematous  changes are seen in both lungs. There is more prominent abnormality in  the superior  aspect of the right lung adjacent to the major fissure.  This consists of more prominent emphysematous change with diffuse  thickening of the adjacent pulmonary parenchyma. This area measures  just over 8 cm transverse. This appears to be a chronic process. There  is a small area of consolidation in the anterior aspect of the lingula  of the left lung. There is additional mild atelectasis in the left  lung base. The lungs are otherwise clear. No pneumothorax is  demonstrated. No pleural effusion is identified. There are  degenerative changes in the spine. No chest wall pathology is seen.  The visualized upper abdomen is unremarkable.      Impression    IMPRESSION:   1. There is a small area of consolidation of the lingula of the left  lung. An infiltrate/pneumonia cannot be excluded.  2. Diffuse emphysematous changes throughout both lungs. There is a  prominent area of more extensive emphysematous change and probable  chronic scarring within the right upper lung.   3. No evidence of pulmonary embolism.    JR FENG MD   CBC with platelets differential   Result Value Ref Range    WBC 7.8 4.0 - 11.0 10e9/L    RBC Count 5.00 3.8 - 5.2 10e12/L    Hemoglobin 16.3 (H) 11.7 - 15.7 g/dL    Hematocrit 48.1 (H) 35.0 - 47.0 %    MCV 96 78 - 100 fl    MCH 32.6 26.5 - 33.0 pg    MCHC 33.9 31.5 - 36.5 g/dL    RDW 13.0 10.0 - 15.0 %    Platelet Count 261 150 - 450 10e9/L    Diff Method Automated Method     % Neutrophils 84.3 %    % Lymphocytes 8.0 %    % Monocytes 6.6 %    % Eosinophils 0.0 %    % Basophils 0.5 %    % Immature Granulocytes 0.6 %    Nucleated RBCs 0 0 /100    Absolute Neutrophil 6.6 1.6 - 8.3 10e9/L    Absolute Lymphocytes 0.6 (L) 0.8 - 5.3 10e9/L    Absolute Monocytes 0.5 0.0 - 1.3 10e9/L    Absolute Eosinophils 0.0 0.0 - 0.7 10e9/L    Absolute Basophils 0.0 0.0 - 0.2 10e9/L    Abs Immature Granulocytes 0.1 0 - 0.4 10e9/L    Absolute Nucleated RBC 0.0    Comprehensive metabolic panel   Result Value Ref  Range    Sodium 130 (L) 133 - 144 mmol/L    Potassium 3.6 3.4 - 5.3 mmol/L    Chloride 91 (L) 94 - 109 mmol/L    Carbon Dioxide 28 20 - 32 mmol/L    Anion Gap 11 3 - 14 mmol/L    Glucose 195 (H) 70 - 99 mg/dL    Urea Nitrogen 32 (H) 7 - 30 mg/dL    Creatinine 1.03 0.52 - 1.04 mg/dL    GFR Estimate 54 (L) >60 mL/min/1.7m2    GFR Estimate If Black 65 >60 mL/min/1.7m2    Calcium 8.7 8.5 - 10.1 mg/dL    Bilirubin Total 0.5 0.2 - 1.3 mg/dL    Albumin 3.5 3.4 - 5.0 g/dL    Protein Total 8.2 6.8 - 8.8 g/dL    Alkaline Phosphatase 110 40 - 150 U/L    ALT 70 (H) 0 - 50 U/L     (H) 0 - 45 U/L   Troponin I   Result Value Ref Range    Troponin I ES 0.030 0.000 - 0.045 ug/L   Nt probnp inpatient (BNP)   Result Value Ref Range    N-Terminal Pro BNP Inpatient 7282 (H) 0 - 900 pg/mL   TSH   Result Value Ref Range    TSH 2.00 0.40 - 4.00 mU/L   Blood gas venous   Result Value Ref Range    Ph Venous 7.16 (LL) 7.32 - 7.43 pH    PCO2 Venous 74 (H) 40 - 50 mm Hg    PO2 Venous 28 25 - 47 mm Hg    Bicarbonate Venous 26 21 - 28 mmol/L    Base Deficit Venous 4.7 mmol/L    FIO2 12    UA with Microscopic reflex to Culture   Result Value Ref Range    Color Urine Yellow     Appearance Urine Cloudy     Glucose Urine Negative NEG^Negative mg/dL    Bilirubin Urine Negative NEG^Negative    Ketones Urine Negative NEG^Negative mg/dL    Specific Gravity Urine 1.018 1.003 - 1.035    Blood Urine Small (A) NEG^Negative    pH Urine 5.0 5.0 - 7.0 pH    Protein Albumin Urine 100 (A) NEG^Negative mg/dL    Urobilinogen mg/dL 2.0 0.0 - 2.0 mg/dL    Nitrite Urine Negative NEG^Negative    Leukocyte Esterase Urine Negative NEG^Negative    Source Catheterized Urine     WBC Urine 3 (H) 0 - 2 /HPF    RBC Urine 3 (H) 0 - 2 /HPF    Squamous Epithelial /HPF Urine 1 0 - 1 /HPF    Mucous Urine Present (A) NEG^Negative /LPF    Hyaline Casts 62 (H) 0 - 2 /LPF    Amorphous Crystals Moderate (A) NEG^Negative /HPF   Lactic acid level STAT   Result Value Ref Range     Lactic Acid 1.3 0.7 - 2.0 mmol/L   Magnesium   Result Value Ref Range    Magnesium 2.5 (H) 1.6 - 2.3 mg/dL   Hemoglobin A1c   Result Value Ref Range    Hemoglobin A1C 5.6 4.3 - 6.0 %   Heparin 10a Level   Result Value Ref Range    Heparin 10A Level <0.10 IU/mL   Glucose by meter   Result Value Ref Range    Glucose 171 (H) 70 - 99 mg/dL   Basic metabolic panel   Result Value Ref Range    Sodium 137 133 - 144 mmol/L    Potassium 4.0 3.4 - 5.3 mmol/L    Chloride 101 94 - 109 mmol/L    Carbon Dioxide 28 20 - 32 mmol/L    Anion Gap 8 3 - 14 mmol/L    Glucose 155 (H) 70 - 99 mg/dL    Urea Nitrogen 26 7 - 30 mg/dL    Creatinine 0.57 0.52 - 1.04 mg/dL    GFR Estimate >90 >60 mL/min/1.7m2    GFR Estimate If Black >90 >60 mL/min/1.7m2    Calcium 8.1 (L) 8.5 - 10.1 mg/dL   CBC with platelets   Result Value Ref Range    WBC 4.5 4.0 - 11.0 10e9/L    RBC Count 4.48 3.8 - 5.2 10e12/L    Hemoglobin 14.6 11.7 - 15.7 g/dL    Hematocrit 43.2 35.0 - 47.0 %    MCV 96 78 - 100 fl    MCH 32.6 26.5 - 33.0 pg    MCHC 33.8 31.5 - 36.5 g/dL    RDW 12.6 10.0 - 15.0 %    Platelet Count 231 150 - 450 10e9/L   Glucose by meter   Result Value Ref Range    Glucose 138 (H) 70 - 99 mg/dL   Heparin Xa (10a) Level   Result Value Ref Range    Heparin 10A Level <0.10 IU/mL   Glucose by meter   Result Value Ref Range    Glucose 150 (H) 70 - 99 mg/dL   Glucose by meter   Result Value Ref Range    Glucose 180 (H) 70 - 99 mg/dL   Glucose by meter   Result Value Ref Range    Glucose 194 (H) 70 - 99 mg/dL   Glucose by meter   Result Value Ref Range    Glucose 252 (H) 70 - 99 mg/dL   CBC with platelets differential   Result Value Ref Range    WBC 8.1 4.0 - 11.0 10e9/L    RBC Count 4.55 3.8 - 5.2 10e12/L    Hemoglobin 14.9 11.7 - 15.7 g/dL    Hematocrit 44.3 35.0 - 47.0 %    MCV 97 78 - 100 fl    MCH 32.7 26.5 - 33.0 pg    MCHC 33.6 31.5 - 36.5 g/dL    RDW 12.7 10.0 - 15.0 %    Platelet Count 274 150 - 450 10e9/L    Diff Method Automated Method     % Neutrophils  86.9 %    % Lymphocytes 7.6 %    % Monocytes 4.9 %    % Eosinophils 0.0 %    % Basophils 0.1 %    % Immature Granulocytes 0.5 %    Nucleated RBCs 0 0 /100    Absolute Neutrophil 7.1 1.6 - 8.3 10e9/L    Absolute Lymphocytes 0.6 (L) 0.8 - 5.3 10e9/L    Absolute Monocytes 0.4 0.0 - 1.3 10e9/L    Absolute Eosinophils 0.0 0.0 - 0.7 10e9/L    Absolute Basophils 0.0 0.0 - 0.2 10e9/L    Abs Immature Granulocytes 0.0 0 - 0.4 10e9/L    Absolute Nucleated RBC 0.0    Basic metabolic panel   Result Value Ref Range    Sodium 138 133 - 144 mmol/L    Potassium 3.8 3.4 - 5.3 mmol/L    Chloride 99 94 - 109 mmol/L    Carbon Dioxide 34 (H) 20 - 32 mmol/L    Anion Gap 5 3 - 14 mmol/L    Glucose 157 (H) 70 - 99 mg/dL    Urea Nitrogen 20 7 - 30 mg/dL    Creatinine 0.46 (L) 0.52 - 1.04 mg/dL    GFR Estimate >90 >60 mL/min/1.7m2    GFR Estimate If Black >90 >60 mL/min/1.7m2    Calcium 8.3 (L) 8.5 - 10.1 mg/dL   Glucose by meter   Result Value Ref Range    Glucose 163 (H) 70 - 99 mg/dL   Glucose by meter   Result Value Ref Range    Glucose 147 (H) 70 - 99 mg/dL   Glucose by meter   Result Value Ref Range    Glucose 181 (H) 70 - 99 mg/dL   Glucose by meter   Result Value Ref Range    Glucose 164 (H) 70 - 99 mg/dL   Glucose by meter   Result Value Ref Range    Glucose 175 (H) 70 - 99 mg/dL   CBC with platelets differential   Result Value Ref Range    WBC 6.3 4.0 - 11.0 10e9/L    RBC Count 4.59 3.8 - 5.2 10e12/L    Hemoglobin 14.7 11.7 - 15.7 g/dL    Hematocrit 44.5 35.0 - 47.0 %    MCV 97 78 - 100 fl    MCH 32.0 26.5 - 33.0 pg    MCHC 33.0 31.5 - 36.5 g/dL    RDW 12.7 10.0 - 15.0 %    Platelet Count 287 150 - 450 10e9/L    Diff Method Automated Method     % Neutrophils 86.6 %    % Lymphocytes 9.0 %    % Monocytes 4.1 %    % Eosinophils 0.0 %    % Basophils 0.0 %    % Immature Granulocytes 0.3 %    Nucleated RBCs 0 0 /100    Absolute Neutrophil 5.5 1.6 - 8.3 10e9/L    Absolute Lymphocytes 0.6 (L) 0.8 - 5.3 10e9/L    Absolute Monocytes 0.3 0.0 -  1.3 10e9/L    Absolute Eosinophils 0.0 0.0 - 0.7 10e9/L    Absolute Basophils 0.0 0.0 - 0.2 10e9/L    Abs Immature Granulocytes 0.0 0 - 0.4 10e9/L    Absolute Nucleated RBC 0.0      *Note: Due to a large number of results and/or encounters for the requested time period, some results have not been displayed. A complete set of results can be found in Results Review.                86

## 2021-06-23 PROBLEM — M79.89 ARM SWELLING: Status: ACTIVE | Noted: 2021-01-01

## 2021-06-23 NOTE — ASSESSMENT
[FreeTextEntry1] : the patient is a 61-year-old female with pancreatic carcinoma who presents to my office today for evaluation for a port placement for chemotherapy.I performed a venous duplex to evaluate the right internal jugular and subclavian veins. Her veins are widely patent. I will schedule her for a MediPort placement. The risks benefits of this procedure were discussed with the patient and her  and they are in agreement.\par \par Due to COVID-19, pre-visit patient instructions were explained to the patient and their symptoms were checked upon arrival. Masks were used by the healthcare provider and staff and the examination room was cleaned after the patient visit was concluded.\par

## 2021-06-23 NOTE — CONSULT LETTER
[Dear  ___] : Dear  [unfilled], [Courtesy Letter:] : I had the pleasure of seeing your patient, [unfilled], in my office today. [Please see my note below.] : Please see my note below. [FreeTextEntry2] : Dr. Candelaria Jacobson

## 2021-06-23 NOTE — HISTORY OF PRESENT ILLNESS
[FreeTextEntry1] : The patient is a 61-year-old female who was recently diagnosed with pancreatic carcinoma. The patient was referred here by her hematologist for placement of a MediPort for chemotherapy.

## 2021-06-25 NOTE — ASSESSMENT
[FreeTextEntry1] : Patient is a 62 y/o with PMHx of HTN whom was referred for EGD and EUS. Patient had Pancreatic Mass noted on prior EUS which was sampled but no tissue. Patient had EUS/ ERCP and was found to have a 3cm mass at body which was sampled was positive for Adenocarcinoma of the Pancreas. She had an ERCP with placement of metallic stent. Patient feels well. \par \par Pancreatic Adenocarcinoma on EUS\par - S/P EUS\par - Follow up with Dr Enriquez/ Oncology \par - ERCP with placement of metallic stent \par - Follow up in 2 months

## 2021-06-25 NOTE — HISTORY OF PRESENT ILLNESS
[Home] : at home, [unfilled] , at the time of the visit. [Medical Office: ([facility])___] : at the medical office located in  [Verbal consent obtained from patient] : the patient, [unfilled] [de-identified] : Patient is a 62 y/o with PMHx of HTN whom was referred for EGD and EUS. Patient had Pancreatic Mass noted on prior EUS which was sampled but no tissue. Patient had EUS/ ERCP and was found to have a 3cm mass at body which was sampled was positive for Adenocarcinoma of the Pancreas. She had an ERCP with placement of metallic stent. Patient feels well.

## 2021-06-25 NOTE — HISTORY OF PRESENT ILLNESS
[Home] : at home, [unfilled] , at the time of the visit. [Medical Office: ([facility])___] : at the medical office located in  [Verbal consent obtained from patient] : the patient, [unfilled] [de-identified] : Patient is a 60 y/o with PMHx of HTN whom was referred for EGD and EUS. Patient had Pancreatic Mass noted on prior EUS which was sampled but no tissue. Patient had EUS/ ERCP and was found to have a 3cm mass at body which was sampled was positive for Adenocarcinoma of the Pancreas. She had an ERCP with placement of metallic stent. Patient feels well.

## 2021-07-01 NOTE — BRIEF OPERATIVE NOTE - NSICDXBRIEFPROCEDURE_GEN_ALL_CORE_FT
PROCEDURES:  Insertion, central venous catheter, tunneled, with port, age 5 years or older 01-Jul-2021 15:08:39  Zac Meyer

## 2021-07-01 NOTE — CHART NOTE - NSCHARTNOTEFT_GEN_A_CORE
PACU ANESTHESIA ADMISSION NOTE      Procedure: Insertion of mediport      Post op diagnosis:  Pancreatic cancer        ____  Intubated  TV:______       Rate: ______      FiO2: ______    _X___  Patent Airway    _X___  Full return of protective reflexes    __X__  Full recovery from anesthesia / back to baseline status    Vitals:  T: 98.3F  HR: 76  /71  RR: 18  SpO2: 98%    Mental Status:  __X__ Awake   _____ Alert   _____ Drowsy   _____ Sedated    Nausea/Vomiting:  __X__ NO  ______Yes,   See Post - Op Orders          Pain Scale (0-10):  _____    Treatment: ____ None    __X__ See Post - Op/PCA Orders    Post - Operative Fluids:   ____ Oral   ___X_ See Post - Op Orders    Plan: Discharge:   X____Home       _____Floor     _____Critical Care    _____  Other:_________________    Comments:  NO anesthetic related complications noted. pt transported to PACU, report endorsed to Rn

## 2021-07-01 NOTE — ASU DISCHARGE PLAN (ADULT/PEDIATRIC) - CARE PROVIDER_API CALL
Donovan Leroy  Vascular Surgery  [address] [place] [id number]  Phone: [phone]  Fax: [phone]  Established Patient  Follow Up Time: 2 weeks

## 2021-08-03 NOTE — ED PROVIDER NOTE - CARE PLAN
Principal Discharge DX:	Back pain   Principal Discharge DX:	Hypokalemia  Secondary Diagnosis:	Prolonged QT interval

## 2021-08-03 NOTE — ED ADULT NURSE NOTE - OBJECTIVE STATEMENT
Patient sent from [facility] for potasium of 2 .3. Patient has hx pancreatics CA and is on continuous chemo infusion

## 2021-08-03 NOTE — H&P ADULT - ATTENDING COMMENTS
62 YO F with a PMH of HTN, HLD, DM2, and recently diagnosed pancreatic CA on CT who was asked to present to the hospital by her Heme/Onc for abnormal potassium level on routine out-pt labs. The pt denies any symptoms. ROS was positive for diarrhea (since resolved), otherwise negative.     In the ED, K was 2.8. EKG w/ prolonged QTc. Pt admitted to tele.     FMHx: Reviewed, not relevant    Physical exam shows pt in NAD. VSS, afebrile, not hypoxic on RA. A&Ox3. Non-focal neuro exam. Muscle strength/sensation intact. CTA B/L with no W/C/R. RRR, no M/G/R. ABD is soft and non-tender, normoactive BSs. LEs without swelling. No rashes. Labs and radiology as above.     Hypokalemia due to diarrhea. Replace. Repeat BMP in the AM.     Prolonged QTc. Monitor Ca, K, and Mg levels. Replace/Treat PRN. Serial EKGs. Hold QT prolonging agents.    Diabetes mellitus with hyperglycemia. A1c. FSs. Insulin PRN.     Hx of HTN and HLD. Restart home meds, except as stated above. DVT PPX. Inform PCP of pt's admission to hospital. My note supersedes the residents note.     Date seen by Attendin/3/21

## 2021-08-03 NOTE — ED ADULT NURSE NOTE - CHIEF COMPLAINT QUOTE
Patient sent from [facility] for potasium of 2 .3. Patient has hx pancreatics CA and is on continuous chemo infusion

## 2021-08-03 NOTE — ED PROVIDER NOTE - CLINICAL SUMMARY MEDICAL DECISION MAKING FREE TEXT BOX
62 yo woman with hypokalemia and weakness with prolonged QT.  Potassium and magnesium replacement.  Admission to tele.

## 2021-08-03 NOTE — ED PROVIDER NOTE - OBJECTIVE STATEMENT
61 y.o female w/ hx of HTN, HLD, DM presents to the ED for evaluation of hypokalemia.  States she had routine lab work which showed K 2.3.  Otherwise asx.  NO diarrhea or vomiting.  NO diuretics.

## 2021-08-03 NOTE — H&P ADULT - NSHPPHYSICALEXAM_GEN_ALL_CORE
VITALS:   Vital Signs Last 24 Hrs  T(C): 36.7 (03 Aug 2021 16:09), Max: 36.7 (03 Aug 2021 16:09)  T(F): 98 (03 Aug 2021 16:09), Max: 98 (03 Aug 2021 16:09)  HR: 97 (03 Aug 2021 16:09) (97 - 97)  BP: 133/66 (03 Aug 2021 16:09) (133/66 - 133/66)  BP(mean): --  RR: 18 (03 Aug 2021 16:09) (18 - 18)  SpO2: 99% (03 Aug 2021 16:09) (99% - 99%)  I&O's Summary    CAPILLARY BLOOD GLUCOSE          PHYSICAL EXAM:  General: WN/WD NAD  HEENT: PERRLA, EOMI, moist mucous membranes  Neurology: A&Ox3, nonfocal, SAMUEL x 4  Respiratory: CTA B/L, normal respiratory effort, no wheezes, crackles, rales  CV: RRR, S1S2, no murmurs, rubs or gallops  Abdominal: Soft, NT, ND +BS, Last BM  Extremities: No edema, + peripheral pulses  Incisions:   Tubes: VITALS:   Vital Signs Last 24 Hrs  T(C): 36.7 (03 Aug 2021 16:09), Max: 36.7 (03 Aug 2021 16:09)  T(F): 98 (03 Aug 2021 16:09), Max: 98 (03 Aug 2021 16:09)  HR: 97 (03 Aug 2021 16:09) (97 - 97)  BP: 133/66 (03 Aug 2021 16:09) (133/66 - 133/66)  BP(mean): --  RR: 18 (03 Aug 2021 16:09) (18 - 18)  SpO2: 99% (03 Aug 2021 16:09) (99% - 99%)  I&O's Summary    CAPILLARY BLOOD GLUCOSE          PHYSICAL EXAM:  General: WN/WD NAD  HEENT: PERRLA, EOMI, moist mucous membranes  Neurology: A&Ox3, nonfocal, SAMUEL x 4  Respiratory: CTA B/L, normal respiratory effort, no wheezes, crackles, rales  CV: RRR, S1S2, no murmurs, rubs or gallops  Abdominal: Soft, NT, ND   Extremities: No edema, + peripheral pulses  Incisions:   Tubes:

## 2021-08-03 NOTE — ED PROVIDER NOTE - CARE PROVIDER_API CALL
George Stafford (MD)  Anesthesiology; Pain Medicine  [address] [place] [address]  Phone: [phone]  Fax: [phone]  Follow Up Time: 1-3 Days

## 2021-08-03 NOTE — ED PROVIDER NOTE - NS ED ROS FT
Constitutional: See HPI.  Eyes: No visual changes, eye pain or discharge  ENMT: No hearing changes, pain, discharge or infections.   Cardiac: No SOB or edema. No chest pain with exertion.  Respiratory: No cough or respiratory distress  GI: No nausea, vomiting, diarrhea or abdominal pain.  : No dysuria, frequency or burning. No Discharge  MS: No myalgia, muscle weakness, joint pain or back pain.  Neuro: No headache or weakness.  Skin: No skin rash.  Except as documented in the HPI, all other systems are negative.

## 2021-08-03 NOTE — ED PROVIDER NOTE - PATIENT PORTAL LINK FT
You can access the FollowMyHealth Patient Portal offered by [facility] by registering at the following website: [email or web address]. By joining [employer]’s FollowMyHealth portal, you will also be able to view your health information using other applications (apps) compatible with our system.

## 2021-08-03 NOTE — H&P ADULT - ASSESSMENT
61 y.o female w/ hx of HTN, HLD, DM, recently diagnosed pancreatic cancer presents to the ED for evaluation of hypokalemia.     # Hypokalemia secondary to diarrhea   - K+: 2.8 OA  - repleted via IV, mag given   - pt sinus tach, prolonged QTC??? (not seen on EKG)  - Monitor BMP @11:30 and AM    # Pancreatic CA  - on chemo  - f/u with nutrition regarding pancreatic enzymes   - f/u op    # DM   - start insulin as needed     # HTN - stable   - was on meds, now not on meds because HTN is stable     # DVT ppx- LMWH  # Activity- AAT  # Diet- carb consistent   # Code status - full   # Dispo- from home

## 2021-08-03 NOTE — ED PROVIDER NOTE - PHYSICAL EXAMINATION
CONST: Well appearing in NAD  EYES:  Sclera and conjunctiva clear.  CARD: Normal S1 S2; Normal rate and rhythm  RESP: Equal BS B/L, No wheezes, rhonchi or rales. No distress  GI: Soft, non-tender, non-distended.  MS: Normal ROM in all extremities. No edema of lower extremities, no calf pain, radial pulses 2+ bilaterally  SKIN: chemo port R chest wall.   NEURO: A&Ox3, No focal deficits. Strength 5/5 with no sensory deficits. Steady gait

## 2021-08-03 NOTE — H&P ADULT - HISTORY OF PRESENT ILLNESS
61 y.o female w/ hx of HTN, HLD, DM, recently diagnosed pancreatic cancer presents to the ED for evaluation of hypokalemia.  States she had routine lab work which showed K 2.3.  Otherwise asx.  NO diarrhea or vomiting.  NO diuretics.    In the ED: pt tachy to 97 but otherwise asx. EKG showed sinus tach with a prolonged QTC so pt was admitted to [facility] for obs.  61 y.o female w/ hx of HTN, HLD, DM, recently diagnosed pancreatic cancer presents to the ED for evaluation of hypokalemia. Pt had her blood work done while getting chemo today, her potassium was found to be 2.3. She was told to come to the ED. She endorses no symptoms. Last week, she did have multiple episodes of diarrhea as she was trying to adjust her pancreatic enzyme dose.     In the ED: pt tachy to 97 but otherwise asx. EKG showed sinus tach with a prolonged QTC so pt was admitted to [facility] for obs.

## 2021-08-03 NOTE — H&P ADULT - NSHPLABSRESULTS_GEN_ALL_CORE
LABS:                        12.2   5.22  )-----------( 161      ( 03 Aug 2021 17:10 )             36.0     08-03    138  |  104  |  7<L>  ----------------------------<  168<H>  2.8<L>   |  21  |  0.7    Ca    9.6      03 Aug 2021 17:10  Mg     1.8     08-03    TPro  6.4  /  Alb  3.9  /  TBili  1.0  /  DBili  x   /  AST  20  /  ALT  18  /  AlkPhos  122<H>  08-03

## 2021-08-03 NOTE — ED PROVIDER NOTE - ATTENDING CONTRIBUTION TO CARE
I personally evaluated the patient. I reviewed the Resident’s or Physician Assistant’s note (as assigned above), and agree with the findings and plan except as documented in my note.  62 yo woman with active cancer on chemo with some diarrhea found to have hypokalemia.  Some generalized weakness.  EKG showed prolonged QT.   Will replace and admit as well as given IV magnesium.

## 2021-08-03 NOTE — ED ADULT TRIAGE NOTE - CHIEF COMPLAINT QUOTE
Patient sent from [facility] for potasium of 2 .3. Patient has hx pancreatics CA and is on continuous chemo infusion

## 2021-08-04 PROBLEM — C25.9 MALIGNANT NEOPLASM OF PANCREAS, UNSPECIFIED: Chronic | Status: ACTIVE | Noted: 2021-01-01

## 2021-08-04 NOTE — DISCHARGE NOTE PROVIDER - HOSPITAL COURSE
61 y.o female w/ hx of HTN, HLD, DM, recently diagnosed pancreatic cancer presents to the ED for evaluation of hypokalemia. Pt had her blood work done while getting chemo today, her potassium was found to be 2.3. She was told to come to the ED. She endorses no symptoms. Last week, she did have multiple episodes of diarrhea as she was trying to adjust her pancreatic enzyme dose.     In the ED: VS WNL. EKG with a prolonged QTC. Labs revealed a K of 2.8.    Hypokalemia likely secondary to diahrrea likely secondary to chemotherapy. Diahrrea resolved last night.     Pt to repeat electrolytes outpatient. Discharged on Potassium 20mEq daily for a total of  days.   61 y.o female w/ hx of HTN, HLD, DM, recently diagnosed pancreatic cancer presents to the ED for evaluation of hypokalemia. Pt had her blood work done while getting chemo today, her potassium was found to be 2.3. She was told to come to the ED. She endorses no symptoms. Last week, she did have multiple episodes of diarrhea as she was trying to adjust her pancreatic enzyme dose.     In the ED: VS WNL. EKG with a prolonged QTC. Labs revealed a K of 2.8.    Hypokalemia likely secondary to diahrrea likely secondary to chemotherapy. Diahrrea resolved last night.     Pt to repeat electrolytes outpatient. Discharged on Potassium 20mEq daily for a total of  days.    35 minutes spent on discharge planning.

## 2021-08-04 NOTE — DISCHARGE NOTE PROVIDER - CARE PROVIDER_API CALL
INOCENCIO AGUILAR  Internal Medicine  [address] [place] [address]  Phone: [phone]  Fax: [phone]  Follow Up Time: 1 week

## 2021-08-04 NOTE — DISCHARGE NOTE PROVIDER - NSDCCPCAREPLAN_GEN_ALL_CORE_FT
PRINCIPAL DISCHARGE DIAGNOSIS  Diagnosis: Hypokalemia  Assessment and Plan of Treatment: We repleted your potassium. We think it is likely due to diahrrea which is due to your chemotherapy vs a direct side effect of your chemotherapy medications. PLEASE REPEAT YOUR BMP IN 4 DAYS AND FOLLOW UP WITH YOUR PCP as an o/p. Please take the Potassium chloride prescribed for 4 days.

## 2021-08-04 NOTE — PROGRESS NOTE ADULT - ASSESSMENT
61F PMHx HTN, HLD, DM2, pancreatic ca on chemo here with hypokalemia.    #Hypokalemia, in setting of diarrhea  resolved, s/p repletion  diarrhea resolved, presumed due to pancreatic insuffiency +/- chemo  d/c on 20meq kcl x4 days until repeat bmp on monday  stable for d/c, needs outpt pmd, onc f/u one week  #HTN  controlled off medications  #HLD  controlled off medications  #DM2  controlled off medications  #Pancreatic ca  stable, outpt f/u onc  #DVT ppx  lovenox         61F PMHx HTN, HLD, DM2, pancreatic ca on chemo here with hypokalemia.    #Hypokalemia, in setting of diarrhea  resolved, s/p repletion  diarrhea resolved, presumed due to pancreatic insuffiency +/- chemo  recently started on creon with resolution  cont immodium prn  d/c on 20meq kcl x4 days until repeat bmp on monday  stable for d/c, needs outpt pmd, onc f/u one week  #HTN  controlled off medications  #HLD  controlled off medications  #DM2  controlled off medications  #Pancreatic ca  stable, outpt f/u onc  #DVT ppx  lovenox

## 2021-08-04 NOTE — DISCHARGE NOTE NURSING/CASE MANAGEMENT/SOCIAL WORK - PATIENT PORTAL LINK FT
You can access the FollowMyHealth Patient Portal offered by [facility] by registering at the following website: [email or web address]. By joining [employer]’s FollowMyHealth portal, you will also be able to view your health information using other applications (apps) compatible with our system.

## 2021-08-04 NOTE — PROGRESS NOTE ADULT - SUBJECTIVE AND OBJECTIVE BOX
INTERVAL HPI/OVERNIGHT EVENTS:    SUBJECTIVE: Patient seen and examined at bedside.     CONSTITUTIONAL: No weakness, fevers or chills  EYES/ENT: No visual changes;  No vertigo or throat pain   NECK: No pain or stiffness  RESPIRATORY: No cough, wheezing, hemoptysis; No shortness of breath  CARDIOVASCULAR: No chest pain or palpitations  GASTROINTESTINAL: No abdominal or epigastric pain. No nausea, vomiting, or hematemesis; No diarrhea or constipation. No melena or hematochezia.  GENITOURINARY: No dysuria, frequency or hematuria  NEUROLOGICAL: No numbness or weakness  SKIN: No itching, rashes    OBJECTIVE:    VITAL SIGNS:  Vital Signs Last 24 Hrs  T(C): 35.9 (04 Aug 2021 08:00), Max: 36.7 (03 Aug 2021 16:09)  T(F): 96.6 (04 Aug 2021 08:00), Max: 98 (03 Aug 2021 16:09)  HR: 84 (04 Aug 2021 08:00) (78 - 97)  BP: 137/69 (04 Aug 2021 08:00) (115/83 - 137/69)  BP(mean): --  RR: 18 (04 Aug 2021 08:00) (17 - 18)  SpO2: 100% (04 Aug 2021 08:00) (97% - 100%)      PHYSICAL EXAM:    General: NAD  HEENT: NC/AT; PERRL, clear conjunctiva  Neck: supple  Respiratory: CTA b/l  Cardiovascular: +S1/S2; RRR  Abdomen: soft, NT/ND; +BS x4  Extremities: WWP, 2+ peripheral pulses b/l; no LE edema  Skin: normal color and turgor; no rash  Neurological:    MEDICATIONS:  MEDICATIONS  (STANDING):  enoxaparin Injectable 40 milliGRAM(s) SubCutaneous daily    MEDICATIONS  (PRN):      ALLERGIES:  Allergies    No Known Allergies    Intolerances        LABS:                        11.3   3.70  )-----------( 152      ( 04 Aug 2021 06:02 )             33.3     Hemoglobin: 11.3 g/dL (08-04 @ 06:02)  Hemoglobin: 12.2 g/dL (08-03 @ 17:10)    CBC Full  -  ( 04 Aug 2021 06:02 )  WBC Count : 3.70 K/uL  RBC Count : 4.07 M/uL  Hemoglobin : 11.3 g/dL  Hematocrit : 33.3 %  Platelet Count - Automated : 152 K/uL  Mean Cell Volume : 81.8 fL  Mean Cell Hemoglobin : 27.8 pg  Mean Cell Hemoglobin Concentration : 33.9 g/dL  Auto Neutrophil # : 2.76 K/uL  Auto Lymphocyte # : 0.45 K/uL  Auto Monocyte # : 0.29 K/uL  Auto Eosinophil # : 0.00 K/uL  Auto Basophil # : 0.01 K/uL  Auto Neutrophil % : 74.6 %  Auto Lymphocyte % : 12.2 %  Auto Monocyte % : 7.8 %  Auto Eosinophil % : 0.0 %  Auto Basophil % : 0.3 %    08-04    138  |  106  |  9<L>  ----------------------------<  149<H>  3.6   |  22  |  0.5<L>    Ca    9.1      04 Aug 2021 06:02  Mg     2.2     08-04    TPro  5.9<L>  /  Alb  3.5  /  TBili  1.0  /  DBili  x   /  AST  20  /  ALT  17  /  AlkPhos  107  08-04    Creatinine Trend: 0.5<--, 0.6<--, 0.7<--  LIVER FUNCTIONS - ( 04 Aug 2021 06:02 )  Alb: 3.5 g/dL / Pro: 5.9 g/dL / ALK PHOS: 107 U/L / ALT: 17 U/L / AST: 20 U/L / GGT: x               hs Troponin:              CSF:                      EKG:   MICROBIOLOGY:    IMAGING:      Labs, imaging, EKG personally reviewed    RADIOLOGY & ADDITIONAL TESTS: Reviewed. INTERVAL HPI/OVERNIGHT EVENTS:    SUBJECTIVE: Patient seen and examined at bedside.     no cp, sob, abd pain, fever  no cp, palpitations, barreto, orhtopnea  OBJECTIVE:    VITAL SIGNS:  Vital Signs Last 24 Hrs  T(C): 35.9 (04 Aug 2021 08:00), Max: 36.7 (03 Aug 2021 16:09)  T(F): 96.6 (04 Aug 2021 08:00), Max: 98 (03 Aug 2021 16:09)  HR: 84 (04 Aug 2021 08:00) (78 - 97)  BP: 137/69 (04 Aug 2021 08:00) (115/83 - 137/69)  BP(mean): --  RR: 18 (04 Aug 2021 08:00) (17 - 18)  SpO2: 100% (04 Aug 2021 08:00) (97% - 100%)      PHYSICAL EXAM:    General: NAD  HEENT: NC/AT; PERRL, clear conjunctiva  Neck: supple  Respiratory: CTA b/l  Cardiovascular: +S1/S2; RRR  Abdomen: soft, NT/ND; +BS x4  Extremities: WWP, 2+ peripheral pulses b/l; no LE edema  Skin: normal color and turgor; no rash  Neurological:    MEDICATIONS:  MEDICATIONS  (STANDING):  enoxaparin Injectable 40 milliGRAM(s) SubCutaneous daily    MEDICATIONS  (PRN):      ALLERGIES:  Allergies    No Known Allergies    Intolerances        LABS:                        11.3   3.70  )-----------( 152      ( 04 Aug 2021 06:02 )             33.3     Hemoglobin: 11.3 g/dL (08-04 @ 06:02)  Hemoglobin: 12.2 g/dL (08-03 @ 17:10)    CBC Full  -  ( 04 Aug 2021 06:02 )  WBC Count : 3.70 K/uL  RBC Count : 4.07 M/uL  Hemoglobin : 11.3 g/dL  Hematocrit : 33.3 %  Platelet Count - Automated : 152 K/uL  Mean Cell Volume : 81.8 fL  Mean Cell Hemoglobin : 27.8 pg  Mean Cell Hemoglobin Concentration : 33.9 g/dL  Auto Neutrophil # : 2.76 K/uL  Auto Lymphocyte # : 0.45 K/uL  Auto Monocyte # : 0.29 K/uL  Auto Eosinophil # : 0.00 K/uL  Auto Basophil # : 0.01 K/uL  Auto Neutrophil % : 74.6 %  Auto Lymphocyte % : 12.2 %  Auto Monocyte % : 7.8 %  Auto Eosinophil % : 0.0 %  Auto Basophil % : 0.3 %    08-04    138  |  106  |  9<L>  ----------------------------<  149<H>  3.6   |  22  |  0.5<L>    Ca    9.1      04 Aug 2021 06:02  Mg     2.2     08-04    TPro  5.9<L>  /  Alb  3.5  /  TBili  1.0  /  DBili  x   /  AST  20  /  ALT  17  /  AlkPhos  107  08-04    Creatinine Trend: 0.5<--, 0.6<--, 0.7<--  LIVER FUNCTIONS - ( 04 Aug 2021 06:02 )  Alb: 3.5 g/dL / Pro: 5.9 g/dL / ALK PHOS: 107 U/L / ALT: 17 U/L / AST: 20 U/L / GGT: x               hs Troponin:              CSF:                      EKG:   MICROBIOLOGY:    IMAGING:      Labs, imaging, EKG personally reviewed    RADIOLOGY & ADDITIONAL TESTS: Reviewed.

## 2021-09-02 NOTE — ED PROVIDER NOTE - NSFOLLOWUPINSTRUCTIONS_ED_ALL_ED_FT
PLEASE FOLLOW UP WITH YOUR ONCOLOGIST FOR YOUR LOW POTASSIUM LEVEL.     Hypokalemia  Hypokalemia means that the amount of potassium in the blood is lower than normal. Potassium is a chemical that helps regulate the amount of fluid in the body (electrolyte). It also stimulates muscle tightening (contraction) and helps nerves work properly. Normally, most of the body’s potassium is inside of cells, and only a very small amount is in the blood. Because the amount in the blood is so small, minor changes to potassium levels in the blood can be life-threatening.    What are the causes?  This condition may be caused by:    Antibiotic medicine.  Diarrhea or vomiting. Taking too much of a medicine that helps you have a bowel movement (laxative) can cause diarrhea and lead to hypokalemia.  Chronic kidney disease (CKD).  Medicines that help the body get rid of excess fluid (diuretics).  Eating disorders, such as bulimia.  Low magnesium levels in the body.  Sweating a lot.    What are the signs or symptoms?  Symptoms of this condition include:    Weakness.  Constipation.  Fatigue.  Muscle cramps.  Mental confusion.  Skipped heartbeats or irregular heartbeat (palpitations).  Tingling or numbness.    How is this diagnosed?  This condition is diagnosed with a blood test.    How is this treated?  Hypokalemia can be treated by taking potassium supplements by mouth or adjusting the medicines that you take. Treatment may also include eating more foods that contain a lot of potassium. If your potassium level is very low, you may need to get potassium through an IV tube in one of your veins and be monitored in the hospital.    Follow these instructions at home:  Take over-the-counter and prescription medicines only as told by your health care provider. This includes vitamins and supplements.  Eat a healthy diet. A healthy diet includes fresh fruits and vegetables, whole grains, healthy fats, and lean proteins.  If instructed, eat more foods that contain a lot of potassium, such as:    Nuts, such as peanuts and pistachios.  Seeds, such as sunflower seeds and pumpkin seeds.  Peas, lentils, and lima beans.  Whole grain and bran cereals and breads.  Fresh fruits and vegetables, such as apricots, avocado, bananas, cantaloupe, kiwi, oranges, tomatoes, asparagus, and potatoes.  Orange juice.  Tomato juice.  Red meats.  Yogurt.    ImageKeep all follow-up visits as told by your health care provider. This is important.  Contact a health care provider if:  You have weakness that gets worse.  You feel your heart pounding or racing.  You vomit.  You have diarrhea.  You have diabetes (diabetes mellitus) and you have trouble keeping your blood sugar (glucose) in your target range.  Get help right away if:  You have chest pain.  You have shortness of breath.  You have vomiting or diarrhea that lasts for more than 2 days.  You faint.  This information is not intended to replace advice given to you by your health care provider. Make sure you discuss any questions you have with your health care provider.

## 2021-09-02 NOTE — ED PROVIDER NOTE - CARE PROVIDER_API CALL
Kelton Bradley)  Hematology; Internal Medicine; Medical Oncology  [address] [place] [address]  Phone: [phone]  Fax: [phone]  Established Patient  Follow Up Time: Urgent

## 2021-09-02 NOTE — ED PROVIDER NOTE - ATTENDING CONTRIBUTION TO CARE
61 y.o female w/ hx of HTN, HLD, DM, pancreatic cancer, previous hypokalemia 2/2 diarrhea  pt presents for hypokalemia. pt had labs done yesterday and was told her K was low. pt had no acute sx. pt states she has chronic diarrhea for which she is taking immodium which improves her sx.     vss  gen- NAD, aaox3  card-rrr  lungs-ctab, no wheezing or rhonchi  abd-sntnd, no guarding or rebound  neuro- full str/sensation, cn ii-xii grossly intact, normal coordination      will get interval labs, ekg, replete potassium prn  dispo pending w/u

## 2021-09-02 NOTE — ED PROVIDER NOTE - CLINICAL SUMMARY MEDICAL DECISION MAKING FREE TEXT BOX
minimal hypokalemia- will replete orally. pt has K at home and was advised to eat K rich foods. pt advised to call her h/o tomorrow to discuss increase baseline supplmentation and when to repeat labs

## 2021-09-02 NOTE — ED PROVIDER NOTE - PATIENT PORTAL LINK FT
You can access the FollowMyHealth Patient Portal offered by [facility] by registering at the following website: [email or web address]. By joining [employer]’s FollowMyHealth portal, you will also be able to view your health information using other applications (apps) compatible with our system. You can access the FollowMyHealth Patient Portal offered by [facility] by registering at the following website: [email or web address]. By joining [employer]’s FollowMyHealth portal, you will also be able to view your health information using other applications (apps) compatible with our system.

## 2021-09-02 NOTE — ED ADULT NURSE NOTE - OBJECTIVE STATEMENT
Sent in by oncologist for low K on blood test last Monday. Pt. has continuous chemo running through port upon arrival.

## 2021-09-02 NOTE — ED PROVIDER NOTE - OBJECTIVE STATEMENT
The patient is a 61 year old male with a history of HTN, HLD, DM, hypokalemia 2/2 diarrhea, pancreatic lesion, sent to ED for evaluation for low potassium. States she had blood work done recently and received a call from her oncologist to go to ED because her K was low (2.4 on 9/1). Pt denies any symptoms. States she has been having chronic diarrhea which she takes pancreatic enzymes and was recently prescribed PO potassium for.

## 2021-09-13 NOTE — ED PROVIDER NOTE - PHYSICAL EXAMINATION
Afebrile, hemodynamically stable, saturating well  NAD, well appearing, sitting comfortably in bed  Head NCAT  EOMI grossly, anicteric  MM dry  No JVD  Tachy, nml S1/S2, no m/r/g  Lungs CTAB, no w/r/r  Abd soft, NT, ND, nml BS, no rebound or guarding  AAO, CN's 3-12 grossly intact  SAUMEL spontaneously, no leg cyanosis or edema  Skin warm, dry, no rashes or hives

## 2021-09-13 NOTE — ED ADULT NURSE NOTE - NSIMPLEMENTINTERV_GEN_ALL_ED
Implemented All Universal Safety Interventions:  [place] to call system. Call bell, personal items and telephone within reach. Instruct patient to call for assistance. Room bathroom lighting operational. Non-slip footwear when patient is off stretcher. Physically safe environment: no spills, clutter or unnecessary equipment. Stretcher in lowest position, wheels locked, appropriate side rails in place.

## 2021-09-13 NOTE — ED PROVIDER NOTE - OBJECTIVE STATEMENT
61yoF with h/o 61yoF with h/o pancreatic cancer on chemo (last 2 wks ago), presents with hypokalemia to 2.2 noted today by oncologist Dr. Enriquez. Pt reports chronic hypokalemia 2/2 chronic diarrhea. Had 2 loose brown stools today. Denies general or extrem weakness or numbness, dizziness, CP, SOB, fever, vomiting, and all other symptoms.

## 2021-09-13 NOTE — ED PROVIDER NOTE - CARE PLAN
1 Principal Discharge DX:	Hypokalemia   Principal Discharge DX:	Hypokalemia  Secondary Diagnosis:	Hyponatremia

## 2021-09-13 NOTE — ED PROVIDER NOTE - CLINICAL SUMMARY MEDICAL DECISION MAKING FREE TEXT BOX
Asymptomatic at this time. ECG unremarkable. No abdominal pain/tenderness or vomiting. Asymptomatic at this time. ECG unremarkable. No abdominal pain/tenderness or vomiting. Noted appearance of dehydration in the setting of hyponatremia, given 1L NS. Also 20 IV and 40 PO to replete her 2.9. Patient is well appearing, NAD, afebrile, hemodynamically stable. Any available tests and studies were discussed with patient and . Discharged with instructions in further symptomatic care, return precautions, and need for close PMD f/u for rpt Na/K check.

## 2021-09-13 NOTE — ED PROVIDER NOTE - PATIENT PORTAL LINK FT
You can access the FollowMyHealth Patient Portal offered by [facility] by registering at the following website: [email or web address]. By joining [facility]’s FollowMyHealth portal, you will also be able to view your health information using other applications (apps) compatible with our system.

## 2021-09-13 NOTE — ED ADULT NURSE NOTE - BREATHING
spontaneous <<----- Click to add NO pertinent Past Medical History No pertinent past medical history

## 2021-09-13 NOTE — ED PROVIDER NOTE - NSFOLLOWUPINSTRUCTIONS_ED_ALL_ED_FT
Please follow up with your primary care doctor in 1-2 days.  Please return to the emergency department if you have dizziness, shortness of breath, chest pain, fever, vomiting, or any other symptoms.      Hypokalemia    WHAT YOU NEED TO KNOW:    Hypokalemia is a low level of potassium in your blood. Potassium helps control how your muscles, heart, and digestive system work. Hypokalemia occurs when your body loses too much potassium or does not absorb enough from food.     DISCHARGE INSTRUCTIONS:    Return to the emergency department if:   •You cannot move your arm or leg.  •You have a fast or irregular heartbeat.  •You are too tired or weak to stand up.    Contact your healthcare provider if:   •You are vomiting, or you have diarrhea.  •You have numbness or tingling in your arms or legs.  •Your symptoms do not go away or they get worse.  •You have questions or concerns about your condition or care.    Medicines:   •Potassium will be given to bring your potassium levels back to normal.  •Take your medicine as directed. Contact your healthcare provider if you think your medicine is not helping or if you have side effects. Tell him of her if you are allergic to any medicine. Keep a list of the medicines, vitamins, and herbs you take. Include the amounts, and when and why you take them. Bring the list or the pill bottles to follow-up visits. Carry your medicine list with you in case of an emergency.    Eat foods that are high in potassium: Foods that are high in potassium include bananas, oranges, tomatoes, potatoes, and avocado. Arizmendi beans, turkey, salmon, lean beef, yogurt, and milk are also high in potassium. Ask your healthcare provider or dietitian for more information about foods that are high in potassium.     Follow up with your healthcare provider as directed: Write down your questions so you remember to ask them during your visits.

## 2021-09-21 NOTE — REASON FOR VISIT
[Initial Consultation] : an initial consultation [Spouse] : spouse [Family Member] : family member [FreeTextEntry2] : pancreatic cancer

## 2021-09-21 NOTE — PHYSICAL EXAM
[Normal] : no peripheral adenopathy appreciated [de-identified] : chronically ill, emaciated with muscle wasting in no acute distress.  [de-identified] : 1 plus pedal edema.  [de-identified] : soft , non-tender , deep seated mass ?

## 2021-10-28 PROBLEM — R60.0 BILATERAL LOWER EXTREMITY EDEMA: Status: ACTIVE | Noted: 2021-01-01

## 2021-10-28 PROBLEM — Z00.00 ENCOUNTER FOR PREVENTIVE HEALTH EXAMINATION: Status: ACTIVE | Noted: 2021-01-01

## 2021-10-28 NOTE — ASU DISCHARGE PLAN (ADULT/PEDIATRIC) - ASU DISCHARGE DATE/TIME
----- Message from Cathy Noland PA-C sent at 10/28/2021  8:16 AM CDT -----  Glucose level normal  FLP- great!  Repeat annually as needed for insurance.    21-Jun-2021 13:20

## 2021-11-29 NOTE — ED PROVIDER NOTE - PROGRESS NOTE DETAILS
FF: spoke with  dr. alcocer from heme/onc stats to hold chemo the thrombocytopenia is probably from the chemo, admit pt to medicine, and should have paracentesis. 1000mg pancreatic enzymes should be given before pt eats. FF: spoke with pt  amaya regarding plan for admission. pt made aware of this. [phone], or [phone]

## 2021-11-29 NOTE — ED ADULT NURSE REASSESSMENT NOTE - NS ED NURSE REASSESS COMMENT FT1
pt presents to the ED c/o abd discomfort and b/l LE swelling. pt was referred by her provider to come to the ED as she may need a paracentesis. pt denies cp, sob, n/v/d

## 2021-11-29 NOTE — ED ADULT NURSE NOTE - CHIEF COMPLAINT QUOTE
Pt sent in from [facility] for abdominal distention & b/l swollen legs; pt has advanced pancreatic cancer. Pt takes 40 mg lasix daily but feels it makes her urinate a lot but does not really help w/ the swelling, which is worsening. Dr. Blair wants to r/o portal vein thrombosis vs IVC thrombosis w/ thrombocytopenia.

## 2021-11-29 NOTE — ED ADULT TRIAGE NOTE - CHIEF COMPLAINT QUOTE
Pt sent in from [facility] for abdominal distention & b/l swollen legs; pt has advanced pancreatic cancer. Pt takes 40 mg lasix daily but feels it makes her urinate a lot but does not really help w/ the swelling, which is worsening. Dr. Blair wants to r/o portal vein thrombosis vs IVC thrombosis w/ thrombocytopenia.

## 2021-11-29 NOTE — ED PROVIDER NOTE - ATTENDING CONTRIBUTION TO CARE
59 y/o female h/o HTN, pancreatic CA on chemo (Odaimi), c/s p/w worsening abdominal distension / anasarca x several weeks, persistent, worse as day progresses, better lying down, minimal relief w/lasix, denies fever, n/v/d, anorexia, cough, respiratory sx, change in bowel habits or urinary sx, vaginal d/c or other associated complaints at present.     Old chart reviewed.  I have reviewed and agree with the initial nursing note, except as documented in my note.    VSS, awake, alert, non-toxic appearing, oropharynx clear, mmm, no JVD or bruit, no jaundice, skin rash or lesions, chest CTAB, non-labored breathing, no w/r/r, +S1/S2, RRR, no m/r/g, abdomen soft, NT, +BS, no pulsatile masses or bruits appreciated, no CVA tenderness, no peripheral edema or deformities, alert, clear speech and steady gait.

## 2021-11-29 NOTE — ED PROVIDER NOTE - NS ED ROS FT
CONST: No fever, chills or bodyaches  EYES: No pain, redness, drainage or visual changes.  ENT: No ear pain or discharge, nasal discharge or congestion. No sore throat  CARD: No chest pain, palpitations  RESP: No SOB, cough, hemoptysis. No hx of asthma or COPD  GI: No abdominal pain, N/V/D. (+) abdominal distension.   : No urinary symptoms  MS: No joint pain, back pain or extremity pain/injury  SKIN: No rashes  NEURO: No headache, dizziness, paresthesias or LOC

## 2021-11-29 NOTE — ED PROVIDER NOTE - PHYSICAL EXAMINATION
Physical Exam    Vital Signs: I have reviewed the initial vital signs.  Constitutional: well-nourished, appears stated age, no acute distress  Eyes: Conjunctiva pink, Sclera clear, PERRLA, EOMI without pain.   Cardiovascular: S1 and S2, regular rate, regular rhythm, well-perfused extremities, radial and pedal pulses equal and 2+ b/l.   Respiratory: unlabored respiratory effort, clear to auscultation bilaterally no wheezing, rales and rhonchi. pt is speaking full sentences. no accessory muscle use.   Gastrointestinal: soft, non-tender, (+) significantly distended abdomen, no pulsatile mass, normal bowl sounds, no rebound, no guarding  Musculoskeletal: supple neck, (+) b/l pedal edema, no calf tenderness,   Integumentary: warm, dry, no rash. no ecchymosis, or petechiae   Neurologic: awake, alert, cranial nerves II-XII grossly intact, extremities’ motor and sensory functions grossly intact.    Psychiatric: appropriate mood, appropriate affect

## 2021-11-30 NOTE — H&P ADULT - ATTENDING COMMENTS
62 y/o female with a PMHx of metastatic pancreatic cancer on chemotherapy (Dx 7/2021, on Gemcitabine following Dr. Hayward) complicated by splenomegaly and varices (splenic/portal vein thrombosis), HTN, DM presented to the ED from oncologist office for noticed Ascites and anasarca.   Oncologist wants to rule out tumor obstruction vs thrombosis given new anascarca and thrombocytopenia.   Patient reports that for past 2-3 weeks she noticed abdominal swelling, and LE swelling. She was started on Lasix 40mg po as outpatient, while it makes her urinate alot, she reports has not improved her abdominal swelling.    # New Large Volume Ascites with Anasarca  CTA Abdo:   Large volume ascites as well as anasarca.  Moderate right and small left pleural effusions.  Right hepatic hyperdensities are noted, largest measuring 1.4 cm. Correlation with prior abdominal CT scans is recommended.  Rind of soft tissue surrounding proximal celiac artery as well as pancreatic ductal dilatation consistent with extension of neoplasm.  Soft tissue density within CBD stent with associated mild to moderate intrahepatic biliary ductal dilatation.  Evaluation for patency of portal venous structures is limited secondary to arterial phase of contrast administration. However, neither portal vein nor IVC are dilated.    Re-call Radiology to maybe reassess the venous phase of the imaging to look into any possible portal/splenic vein obstruction d/t tumor/thrombus.   Also double check if Spleen is normal?  Used to have splenomegaly in the past.   - on po Lasix 40mg at home, continue with Lasix 40mg IV while here. Feels like swelling better already.   - hold off on paracentesis given plt count, reconsider once plt improves      #Metastatic pancreatic cancer on chemotherapy   - completed 5 cycles of Foifininox which she was not able to tolerate well due to severe diarrhea, hypokalemia and weight loss  - switched to Gemcitabine completed 1 cycle with day 8 delayed due to thrombocytopenia  - suppose to start cycle 2, but held due to plt 31  - hold chem DVT PPx give plt <50K  - keep active T&S, transfuse if plt <10K or active bleeding     #Thrombocytopenia with anemia:  May be d/t Chemo.   Have radiology double check whether she still has splenomegaly? Splenic sequestration could be a differential.     #Hx of HTN  - controlled off medications    #DVT PPx: SCD, hold chem   #GI PPx: not indicated  #DASH

## 2021-11-30 NOTE — H&P ADULT - NSHPLABSRESULTS_GEN_ALL_CORE
8.8    5.57  )-----------( 31       ( 29 Nov 2021 13:50 )             26.4     11-29    135  |  101  |  5<L>  ----------------------------<  99  4.1   |  21  |  <0.5<L>    Ca    7.7<L>      29 Nov 2021 13:50    TPro  4.7<L>  /  Alb  2.4<L>  /  TBili  0.9  /  DBili  x   /  AST  35  /  ALT  17  /  AlkPhos  128<H>  11-29    PT/INR - ( 29 Nov 2021 13:50 )   PT: 15.70 sec;   INR: 1.37 ratio         PTT - ( 29 Nov 2021 13:50 )  PTT:28.6 sec          LIVER FUNCTIONS - ( 29 Nov 2021 13:50 )  Alb: 2.4 g/dL / Pro: 4.7 g/dL / ALK PHOS: 128 U/L / ALT: 17 U/L / AST: 35 U/L / GGT: x             COVID-19 PCR: NotDetec (11-29-21 @ 23:06)          RADIOLOGY & ADDITIONAL STUDIES:    EXAM:  CT ANGIO ABD PELV (W)AW IC            PROCEDURE DATE:  11/29/2021            INTERPRETATION:  CLINICAL HISTORY / REASON FOR EXAM: Abdominal pain. History of pancreatic cancer. Rule out IVC or portal vein thrombosis.    TECHNIQUE: Contiguous axial CTA images were obtained from the lower chest to the pubic symphysis following administration of intravenous contrast using an arterial phase protocol. Oral contrast was not administered. Reformatted images in the coronal and sagittal planes were acquired.    COMPARISON: None available.    FINDINGS:    LOWER CHEST: Moderate right and small left pleural effusions with adjacent compressive atelectasis.    HEPATOBILIARY: CBD stent in place with soft tissue density within. Cholelithiasis. Mild to moderate intrahepatic biliary duct dilatation. Right hepatic hyperdensities are noted, largest measuring 1.4 cm (series 4 image 42). Correlation with prior abdominal CT scans is recommended.    SPLEEN: Unremarkable.    PANCREAS: Main pancreatic ductdilatation to approximately 7 mm at the level of the pancreatic tail. There is prominence of the soft tissue in the region of the pancreatic neck/body junction with rind of soft tissue surrounding proximal celiac artery. However, there is no evidenceof associated celiac artery narrowing. The ostium of the celiac artery is narrowed secondary to the median arcuate ligament rather than a mass.    ADRENAL GLANDS: Unremarkable.    KIDNEYS: Symmetric enhancement. No hydronephrosis.    ABDOMINOPELVIC NODES: Limited in evaluation secondary to large volume ascites.    PELVIC ORGANS: Unremarkable.    PERITONEUM/MESENTERY/BOWEL: Large volume ascites. No bowel obstruction or intraperitoneal free air.    BONES/SOFT TISSUES: Degenerative change of the spine. There is extensive anasarca.    OTHER: Evaluation for patency of portal venous structures is limited secondary to arterial phase of contrast administration.      IMPRESSION:      Large volume ascites as well as anasarca.    Moderate right and small left pleural effusions.    Right hepatic hyperdensities are noted, largest measuring 1.4 cm. Correlation with prior abdominal CT scans is recommended.    Rind of soft tissue surrounding proximal celiac artery as well as pancreatic ductal dilatation consistent with extension of neoplasm.    Soft tissue density within CBD stent with associated mild to moderate intrahepatic biliary ductal dilatation.    Evaluation for patency of portal venous structures is limited secondary to arterial phase of contrast administration. However, neither portal vein nor IVC are dilated.

## 2021-11-30 NOTE — CONSULT NOTE ADULT - ASSESSMENT
61 year old white female diagnosed with metastatic pancreatic cancer in May 2021 when she presented with diarrhea , indigestion and progressive weight loss. PET scan showed mass involving neck and body of pancreas with regional lymph node encasing SMA in addition to left para aortic node in mid abdomen. splenomegaly and suspected early varices suggestive of splenic/portal vein thrombosis. biliary duct dilatation due to peripancreatic and and orlando hepatis adenopathy.   EUS with biopsy confirmed pancreatic adenocarcinoma , she was found with involvement of portal vein and CBD. ERCP was done with placement of metallic stent.   She has received this far 5 cycles of chemotherapy (likely FOLFIRINOX ) complicated with severe diarrhea , electrolyte imbalance , partially controlled with lomotil , stopped due to dizziness and loss of appetite. She lost nearly 40 lbs and complains of generalized weakness, no fever, abdominal or back pain , no itching. She takes pancreatic enzymes one with meals , ensure one daily in addition to mineral supplements. She ambulates without assistance and is capable of limited self care.  Meds : kcl , pancreatic enzymes. ( HCTZ was discontinued ).     On 11/29/2021 patient presented to Dr. Ferrers at the [facility]. She had previously recieved 1 cycle of Gemcitabine with D8 delayed due to thrombocytopenia. She came on the 29th for cycle 2 of treatment. Patient noted to have + 3 pitting edema of the B/L LE and abdominal distention with areas of pitting suggestive of obstruction (tumor burden vs thrombosis) CT of Abd from 10/12/21 showed encasement of the celiac artery, common hepatic, proper hepatic, and right hepatic splenic arteries; vessels remained patent at that time. Main portal vein viewed narrow. Patient denies SOB, cough, leg pain, unusual bleeding or bruising, or abdominal pain at this time. CBC reviewed thrombocytopenia noted.     Admitted to the ED, CT scan showed:  Large volume ascites as well as anasarca.  Moderate right and small left pleural effusions.  Right hepatic hyperdensities are noted, largest measuring 1.4 cm. Correlation with prior abdominal CT scans is recommended.  Rind of soft tissue surrounding proximal celiac artery as well as pancreatic ductal dilatation consistent with extension of neoplasm.  Soft tissue density within CBD stent with associated mild to moderate intrahepatic biliary ductal dilatation.  Evaluation for patency of portal venous structures is limited secondary to arterial phase of contrast administration. However, neither portal vein nor IVC are dilated.      Plan:   -- CBC reviewed: PLTS: 34 new anasarca. Will hold chemotherapy.  -- Daily CBC to assess thrombocytopenia  -- When platelets near normal, may consider tap   -- No evidence of protal vein thrombosis on this study, consider reviewing with radiology for assessment (maybe run a venous phase? has normal kidney function) 61 year old white female diagnosed with metastatic pancreatic cancer in May 2021 when she presented with diarrhea , indigestion and progressive weight loss. PET scan showed mass involving neck and body of pancreas with regional lymph node encasing SMA in addition to left para aortic node in mid abdomen. splenomegaly and suspected early varices suggestive of splenic/portal vein thrombosis. biliary duct dilatation due to peripancreatic and and orlando hepatis adenopathy.   EUS with biopsy confirmed pancreatic adenocarcinoma , she was found with involvement of portal vein and CBD. ERCP was done with placement of metallic stent.   She has received this far 5 cycles of chemotherapy (likely FOLFIRINOX ) complicated with severe diarrhea , electrolyte imbalance , partially controlled with lomotil , stopped due to dizziness and loss of appetite. She lost nearly 40 lbs and complains of generalized weakness, no fever, abdominal or back pain , no itching. She takes pancreatic enzymes one with meals , ensure one daily in addition to mineral supplements. She ambulates without assistance and is capable of limited self care.  Meds : kcl , pancreatic enzymes. ( HCTZ was discontinued ).   On 11/29/2021 patient presented to Dr. Ferrers at the [facility]. She had previously recieved 1 cycle of Gemcitabine with D8 delayed due to thrombocytopenia. She came on the 29th for cycle 2 of treatment. Patient noted to have + 3 pitting edema of the B/L LE and abdominal distention with areas of pitting suggestive of obstruction (tumor burden vs thrombosis) CT of Abd from 10/12/21 showed encasement of the celiac artery, common hepatic, proper hepatic, and right hepatic splenic arteries; vessels remained patent at that time. Main portal vein viewed narrow. Patient denies SOB, cough, leg pain, unusual bleeding or bruising, or abdominal pain at this time. CBC reviewed thrombocytopenia noted.   Admitted to the ED, CT scan showed:  Large volume ascites as well as anasarca.  Moderate right and small left pleural effusions.  Right hepatic hyperdensities are noted, largest measuring 1.4 cm. Correlation with prior abdominal CT scans is recommended.  Rind of soft tissue surrounding proximal celiac artery as well as pancreatic ductal dilatation consistent with extension of neoplasm.  Soft tissue density within CBD stent with associated mild to moderate intrahepatic biliary ductal dilatation.  Evaluation for patency of portal venous structures is limited secondary to arterial phase of contrast administration. However, neither portal vein nor IVC are dilated.    IMPRESSION:    1. Stage IV pancreatic Ca     -On single agent gemzar (on hold due to thrombocytopenia)     -Now with worsening anasarca and ascites -r/o portal vein thrombosis     -CT shows evidence of progression    2. Acute thrombocytopenia     -likely 2/2 chemo (gemzar)      -Had prev low plt due to same reason      Plan:   -- CBC reviewed: PLTS: 34 new anasarca. Will hold chemotherapy.  -- Daily CBC to assess thrombocytopenia  -- Can send retic count, vit b12, folate level   -- When platelet 50-60k, may consider paracentesis: send fluid cytology  -- No evidence of protal vein thrombosis on this study, consider reviewing with radiology for assessment (maybe run a venous phase? has normal kidney function)  -- Palliative care evaluation for symptom management   -- Will decide whether to continue Gemzar or switch treatment to be decided as outpatient with Dr Hayward.

## 2021-11-30 NOTE — H&P ADULT - NSHPPHYSICALEXAM_GEN_ALL_CORE
LOS: 1d    VITALS:   T(C): 36.8 (11-29-21 @ 23:32), Max: 36.9 (11-29-21 @ 15:49)  HR: 82 (11-29-21 @ 23:32) (82 - 84)  BP: 124/70 (11-29-21 @ 23:32) (124/70 - 143/91)  RR: 18 (11-29-21 @ 23:32) (16 - 18)  SpO2: 98% (11-29-21 @ 23:32) (96% - 98%)    GENERAL: NAD, lying in bed comfortably  HEAD:  Atraumatic, Normocephalic  EYES: EOMI, PERRLA, conjunctiva and sclera clear  ENT: Moist mucous membranes  NECK: Supple, No JVD  CHEST/LUNG: Clear to auscultation bilaterally; No rales, rhonchi, wheezing, or rubs. Unlabored respirations  HEART: Regular rate and rhythm; No murmurs, rubs, or gallops  ABDOMEN: BSx4; Soft, nontender, nondistended  EXTREMITIES:  2+ Peripheral Pulses, brisk capillary refill. No clubbing, cyanosis, or edema  NERVOUS SYSTEM:  A&Ox3, no focal deficits   SKIN: No rashes or lesions VITALS:   T(C): 36.8 (11-29-21 @ 23:32), Max: 36.9 (11-29-21 @ 15:49)  HR: 82 (11-29-21 @ 23:32) (82 - 84)  BP: 124/70 (11-29-21 @ 23:32) (124/70 - 143/91)  RR: 18 (11-29-21 @ 23:32) (16 - 18)  SpO2: 98% (11-29-21 @ 23:32) (96% - 98%)    GENERAL: NAD, lying in bed comfortably, fraile, cachectic   HEAD:  Atraumatic, Normocephalic  EYES: EOMI, PERRLA, conjunctiva and sclera clear  CHEST/LUNG: Clear to auscultation bilaterally; No rales, rhonchi, wheezing, or rubs.   HEART: Regular rate and rhythm; No murmurs, rubs, or gallops  ABDOMEN: BSx4; largely distended, no tenderness to palpation  EXTREMITIES:  2+ Peripheral Pulses, brisk capillary refill. 3+ pitting edema bilateral LE  NERVOUS SYSTEM:  A&Ox3, no focal deficits

## 2021-11-30 NOTE — CONSULT NOTE ADULT - SUBJECTIVE AND OBJECTIVE BOX
Patient is a 61y old  Female who presents with a chief complaint of thrombocytopenia (2021 03:08)    HPI:  62 y/o female with a PMHx of metastatic pancreatic cancer on chemotherapy (Dx 2021, on Gemcitabine following Dr. Hayward) complicated by splenomegaly and varices (splenic/portal vein thrombosis), HTN presented to the ED from oncologist office to rule out tumor obstruction vs thrombosis given new anascarca and thrombocytopenia. Patient reports that for past 2-3 weeks she noticed abdominal swelling, and LE swelling. She was started on Lasix 40mg po as outpatient, while it makes her urinate alot, she reports has not improved her abdominal swelling. She denies any SOB, abdominal pain, nausea, vomiting, diarrhea, constipation, fevers, chills. She also denies any melena, BRBPR, hematemesis, epistaxis or hematuria.     In the ED, VS noted for T 96.7, HR 84, /91. Labs noted for WBC 5, Hg 8.8, Plt 31. INR 1.37, . CT abd/pelv shows large volume ascites and anasarca, moderate right and small left pleural effusions. Right hepatic hyperdensities largest 1.4cm. Ring of soft tissue surrounding proximal celiac artery consistent with extension of neoplasm. Portal vein nor IVC are dilated.        (2021 03:08)         PAST MEDICAL & SURGICAL HISTORY:  HTN (hypertension)    Pancreatic cancer    Single delivery by  section        SOCIAL HISTORY:    FAMILY HISTORY:    Allergies    No Known Allergies    Intolerances      ROS:  Negative except for:        Height (cm): 165.1 (21 @ 05:12)  Weight (kg): 72.5 (21 @ 05:12)  BMI (kg/m2): 26.6 (21 @ 05:12)  BSA (m2): 1.8 (21 @ 05:12)      HOME MEDICATIONS:  Lasix 40 mg oral tablet: 1 tab(s) orally once a day (2021 04:15)    Vital Signs Last 24 Hrs  T(C): 35.8 (2021 05:12), Max: 36.9 (2021 15:49)  T(F): 96.5 (2021 05:12), Max: 98.5 (2021 15:49)  HR: 78 (2021 05:12) (78 - 84)  BP: 123/82 (2021 05:12) (123/82 - 143/91)  BP(mean): --  RR: 18 (2021 05:12) (16 - 18)  SpO2: 98% (2021 23:32) (96% - 98%)    PHYSICAL EXAM  General: adult in NAD  HEENT: clear oropharynx, anicteric sclera, pink conjunctiva  Neck: supple  CV: normal S1/S2 with no murmur rubs or gallops  Lungs: positive air movement b/l ant lungs,clear to auscultation, no wheezes, no rales  Abdomen: soft non-tender non-distended, no hepatosplenomegaly  Ext: no clubbing cyanosis or edema  Skin: no rashes and no petechiae  Neuro: alert and oriented X 4, no focal deficits    MEDICATIONS  (STANDING):  furosemide   Injectable 40 milliGRAM(s) IV Push daily  magnesium sulfate  IVPB 2 Gram(s) IV Intermittent once  pancrelipase  (CREON 36,000 Lipase Units) 1 Capsule(s) Oral three times a day with meals    MEDICATIONS  (PRN):      LABS:                          9.3    5.44  )-----------(        ( 2021 04:30 )             28.2         Mean Cell Volume : 93.1 fL  Mean Cell Hemoglobin : 30.7 pg  Mean Cell Hemoglobin Concentration : 33.0 g/dL  Auto Neutrophil # : x  Auto Lymphocyte # : x  Auto Monocyte # : x  Auto Eosinophil # : x  Auto Basophil # : x  Auto Neutrophil % : x  Auto Lymphocyte % : x  Auto Monocyte % : x  Auto Eosinophil % : x  Auto Basophil % : x      Serial CBC's   @ 04:30  Hct-28.2 / Hgb-9.3 / Plat-23 / RBC-3.03 / WBC-5.44  Serial CBC's   @ 13:50  Hct-26.4 / Hgb-8.8 / Plat-31 / RBC-2.86 / WBC-5.57          134<L>  |  98  |  4<L>  ----------------------------<  77  3.7   |  19  |  <0.5<L>    Ca    7.8<L>      2021 04:30  Mg     1.7     11-30    TPro  4.7<L>  /  Alb  2.3<L>  /  TBili  1.0  /  DBili  x   /  AST  34  /  ALT  18  /  AlkPhos  135<H>  11-30      PT/INR - ( 2021 04:30 )   PT: 15.80 sec;   INR: 1.38 ratio         PTT - ( 2021 04:30 )  PTT:29.5 sec                            BLOOD SMEAR INTERPRETATION:       RADIOLOGY & ADDITIONAL STUDIES:

## 2021-11-30 NOTE — H&P ADULT - ASSESSMENT
#Thrombocytopenia with anemia   -       #Large Volume Ascites with Anasarca  - continue with diuretics  - hold off on paracentesis given plt count, reconsider once plt improves    #DVT PPx: SCD, hold chem   #GI PPx: not indicated  #DASH    62 y/o female with a PMHx of metastatic pancreatic cancer on chemotherapy (Dx 7/2021, on Gemcitabine following Dr. Hayward) complicated by splenomegaly and varices (splenic/portal vein thrombosis), HTN, DM presented to the ED from oncologist office to rule out tumor obstruction vs thrombosis given new anascarca and thrombocytopenia. Patient reports that for past 2-3 weeks she noticed abdominal swelling, and LE swelling. She was started on Lasix 40mg po as outpatient, while it makes her urinate alot, she reports has not improved her abdominal swelling.    #Thrombocytopenia with anemia   #Metastatic pancreatic cancer on chemotherapy   - completed 5 cycles of Foifininox which she was not able to tolerate well due to severe diarrhea, hypokalemia and weight loss  - switched to Gemcitabine completed 1 cycle with day 8 delayed due to thrombocytopenia  - suppose to start cycle 2, but held due to plt 31  - hold chem DVT PPx give plt <50K  - keep active T&S, transfuse if plt <10K or active bleeding     #Large Volume Ascites with Anasarca  - on po Lasix 40mg at home, continue with Lasix 40mg IV  - hold off on paracentesis given plt count, reconsider once plt improves  - no CT evidence of PV thrombosis or tumor obstruction     #Hx of HTN  - controlled off medications    #DVT PPx: SCD, hold chem   #GI PPx: not indicated  #DASH

## 2021-11-30 NOTE — H&P ADULT - HISTORY OF PRESENT ILLNESS
62 y/o female with a PMH of pancreatic ca (dx 07/2021 followed by Dr. Ayers on chemo, has had 8 rounds was due for 9th round today) presents to the ED sent in by Dr. Ayers to r/o thrombocytopenia, IVC thrombosis, or portal vein thrombosis. pt reports although she is on 40mg of lasix daily due to ascites, and is urinating a lot, her abdominal distension isn't improving and she has b/l feet swelling. pt denies chest pain, sob, abdominal pain, n/v/d/c, leg pain, leg swelling, back pain, dizziness, headache, fever, chills, hematuria, blood in the stool, or weakness 62 y/o female with a PMHx of metastatic pancreatic cancer on chemotherapy (Dx 7/2021, on Gemcitabine following Dr. Hayward) complicated by splenomegaly and varices (splenic/portal vein thrombosis), HTN, DM presented to the ED from oncologist office to rule out tumor obstruction vs thrombosis given new anascarca and thrombocytopenia.        presents to the ED sent in by Dr. Ayers to r/o thrombocytopenia, IVC thrombosis, or portal vein thrombosis. pt reports although she is on 40mg of lasix daily due to ascites, and is urinating a lot, her abdominal distension isn't improving and she has b/l feet swelling. pt denies chest pain, sob, abdominal pain, n/v/d/c, leg pain, leg swelling, back pain, dizziness, headache, fever, chills, hematuria, blood in the stool, or weakness 62 y/o female with a PMHx of metastatic pancreatic cancer on chemotherapy (Dx 7/2021, on Gemcitabine following Dr. Hayward) complicated by splenomegaly and varices (splenic/portal vein thrombosis), HTN presented to the ED from oncologist office to rule out tumor obstruction vs thrombosis given new anascarca and thrombocytopenia. Patient reports that for past 2-3 weeks she noticed abdominal swelling, and LE swelling. She was started on Lasix 40mg po as outpatient, while it makes her urinate alot, she reports has not improved her abdominal swelling. She denies any SOB, abdominal pain, nausea, vomiting, diarrhea, constipation, fevers, chills. She also denies any melena, BRBPR, hematemesis, epistaxis or hematuria.     In the ED, VS noted for T 96.7, HR 84, /91. Labs noted for WBC 5, Hg 8.8, Plt 31. INR 1.37, . CT abd/pelv shows large volume ascites and anasarca, moderate right and small left pleural effusions. Right hepatic hyperdensities largest 1.4cm. Ring of soft tissue surrounding proximal celiac artery consistent with extension of neoplasm. Portal vein nor IVC are dilated.

## 2021-12-01 NOTE — PROGRESS NOTE ADULT - ASSESSMENT
61 year old white female diagnosed with metastatic pancreatic cancer in May 2021 when she presented with diarrhea , indigestion and progressive weight loss. PET scan showed mass involving neck and body of pancreas with regional lymph node encasing SMA in addition to left para aortic node in mid abdomen. splenomegaly and suspected early varices suggestive of splenic/portal vein thrombosis. biliary duct dilatation due to peripancreatic and and [place] hepatis adenopathy.   EUS with biopsy confirmed pancreatic adenocarcinoma , she was found with involvement of portal vein and CBD. ERCP was done with placement of metallic stent.   She has received this far 5 cycles of chemotherapy (likely FOLFIRINOX ) complicated with severe diarrhea , electrolyte imbalance , partially controlled with lomotil , stopped due to dizziness and loss of appetite. She lost nearly 40 lbs and complains of generalized weakness, no fever, abdominal or back pain , no itching. She takes pancreatic enzymes one with meals , ensure one daily in addition to mineral supplements. She ambulates without assistance and is capable of limited self care.  Meds : kcl , pancreatic enzymes. ( HCTZ was discontinued ).   On 11/29/2021 patient presented to Dr. Ferrers at the [facility]. She had previously recieved 1 cycle of Gemcitabine with D8 delayed due to thrombocytopenia. She came on the 29th for cycle 2 of treatment. Patient noted to have + 3 pitting edema of the B/L LE and abdominal distention with areas of pitting suggestive of obstruction (tumor burden vs thrombosis) CT of Abd from 10/12/21 showed encasement of the celiac artery, common hepatic, proper hepatic, and right hepatic splenic arteries; vessels remained patent at that time. Main portal vein viewed narrow. Patient denies SOB, cough, leg pain, unusual bleeding or bruising, or abdominal pain at this time. CBC reviewed thrombocytopenia noted.   Admitted to the ED, CT scan showed:  Large volume ascites as well as anasarca.  Moderate right and small left pleural effusions.  Right hepatic hyperdensities are noted, largest measuring 1.4 cm. Correlation with prior abdominal CT scans is recommended.  Rind of soft tissue surrounding proximal celiac artery as well as pancreatic ductal dilatation consistent with extension of neoplasm.  Soft tissue density within CBD stent with associated mild to moderate intrahepatic biliary ductal dilatation.  Evaluation for patency of portal venous structures is limited secondary to arterial phase of contrast administration. However, neither portal vein nor IVC are dilated.    IMPRESSION:    1. Stage IV pancreatic Ca     -On single agent gemzar (on hold due to thrombocytopenia)     -Now with worsening anasarca and ascites -r/o portal vein thrombosis     -CT shows evidence of progression    2. Acute thrombocytopenia     -likely 2/2 chemo (gemzar)      -Had prev low plt due to same reason      Plan:   -- CBC reviewed: PLTS: 20k with new anasarca. Will hold chemotherapy.  -- Daily CBC to assess thrombocytopenia -will review smear  -- Can send retic count, vit b12, folate level   -- When platelet 50-60k, may consider paracentesis: send fluid cytology  -- No evidence of protal vein thrombosis on this study, consider reviewing with radiology for assessment (maybe run a venous phase? has normal kidney function)  -- Palliative care evaluation for symptom management   -- Will decide whether to continue Gemzar or switch treatment to be decided as outpatient with Dr Hayward.      61 year old white female diagnosed with metastatic pancreatic cancer in May 2021 when she presented with diarrhea , indigestion and progressive weight loss. PET scan showed mass involving neck and body of pancreas with regional lymph node encasing SMA in addition to left para aortic node in mid abdomen. splenomegaly and suspected early varices suggestive of splenic/portal vein thrombosis. biliary duct dilatation due to peripancreatic and and [place] hepatis adenopathy.   EUS with biopsy confirmed pancreatic adenocarcinoma , she was found with involvement of portal vein and CBD. ERCP was done with placement of metallic stent.   She has received this far 5 cycles of chemotherapy (likely FOLFIRINOX ) complicated with severe diarrhea , electrolyte imbalance , partially controlled with lomotil , stopped due to dizziness and loss of appetite. She lost nearly 40 lbs and complains of generalized weakness, no fever, abdominal or back pain , no itching. She takes pancreatic enzymes one with meals , ensure one daily in addition to mineral supplements. She ambulates without assistance and is capable of limited self care.  Meds : kcl , pancreatic enzymes. ( HCTZ was discontinued ).   On 11/29/2021 patient presented to Dr. Ferrers at the [facility]. She had previously recieved 1 cycle of Gemcitabine with D8 delayed due to thrombocytopenia. She came on the 29th for cycle 2 of treatment. Patient noted to have + 3 pitting edema of the B/L LE and abdominal distention with areas of pitting suggestive of obstruction (tumor burden vs thrombosis) CT of Abd from 10/12/21 showed encasement of the celiac artery, common hepatic, proper hepatic, and right hepatic splenic arteries; vessels remained patent at that time. Main portal vein viewed narrow. Patient denies SOB, cough, leg pain, unusual bleeding or bruising, or abdominal pain at this time. CBC reviewed thrombocytopenia noted.   Admitted to the ED, CT scan showed:  Large volume ascites as well as anasarca.  Moderate right and small left pleural effusions.  Right hepatic hyperdensities are noted, largest measuring 1.4 cm. Correlation with prior abdominal CT scans is recommended.  Rind of soft tissue surrounding proximal celiac artery as well as pancreatic ductal dilatation consistent with extension of neoplasm.  Soft tissue density within CBD stent with associated mild to moderate intrahepatic biliary ductal dilatation.  Evaluation for patency of portal venous structures is limited secondary to arterial phase of contrast administration. However, neither portal vein nor IVC are dilated.    IMPRESSION:    1. Stage IV pancreatic Ca     -On single agent gemzar (on hold due to thrombocytopenia)     -Now with worsening anasarca and ascites -r/o portal vein thrombosis     -CT shows evidence of progression    2. Acute thrombocytopenia     -likely 2/2 chemo (gemzar)      -Had prev low plt due to same reason      Plan:   -- CBC reviewed: PLTS: 28k with new anasarca. Will hold chemotherapy.  -- Daily CBC to assess thrombocytopenia: trending up -will review smear  -- Can send retic count, vit b12, folate level   -- When platelet 50-60k, may consider paracentesis: send fluid cytology  -- No evidence of protal vein thrombosis on this study, consider reviewing with radiology for assessment (maybe run a venous phase? has normal kidney function)  -- Palliative care evaluation for symptom management   -- Will decide whether to continue Gemzar or switch treatment to be decided as outpatient with Dr Hayward.

## 2021-12-01 NOTE — PROGRESS NOTE ADULT - SUBJECTIVE AND OBJECTIVE BOX
Patient is a 61y old  Female who presents with a chief complaint of thrombocytopenia (30 Nov 2021 10:16)      Subjective:      Vital Signs Last 24 Hrs  T(C): 35.9 (01 Dec 2021 05:24), Max: 36.2 (30 Nov 2021 20:24)  T(F): 96.7 (01 Dec 2021 05:24), Max: 97.1 (30 Nov 2021 20:24)  HR: 79 (01 Dec 2021 05:24) (79 - 89)  BP: 143/84 (01 Dec 2021 05:24) (119/68 - 143/84)  BP(mean): --  RR: 18 (01 Dec 2021 05:24) (18 - 18)  SpO2: 96% (30 Nov 2021 12:47) (96% - 96%)    PHYSICAL EXAM  General: adult in NAD  HEENT: clear oropharynx, anicteric sclera, pink conjunctiva  Neck: supple  CV: normal S1/S2 with no murmur rubs or gallops  Lungs: positive air movement b/l ant lungs,clear to auscultation, no wheezes, no rales  Abdomen: soft non-tender non-distended, no hepatosplenomegaly  Ext: no clubbing cyanosis or edema  Skin: no rashes and no petechiae  Neuro: alert and oriented X 4, no focal deficits    MEDICATIONS  (STANDING):  furosemide   Injectable 40 milliGRAM(s) IV Push daily  pancrelipase  (CREON 36,000 Lipase Units) 1 Capsule(s) Oral three times a day with meals    MEDICATIONS  (PRN):      LABS:                          9.1    7.06  )-----------( 20       ( 30 Nov 2021 12:10 )             27.7         Mean Cell Volume : 91.4 fL  Mean Cell Hemoglobin : 30.0 pg  Mean Cell Hemoglobin Concentration : 32.9 g/dL  Auto Neutrophil # : 4.96 K/uL  Auto Lymphocyte # : 1.26 K/uL  Auto Monocyte # : 0.78 K/uL  Auto Eosinophil # : 0.00 K/uL  Auto Basophil # : 0.01 K/uL  Auto Neutrophil % : 70.4 %  Auto Lymphocyte % : 17.8 %  Auto Monocyte % : 11.0 %  Auto Eosinophil % : 0.0 %  Auto Basophil % : 0.1 %      Serial CBC's  11-30 @ 12:10  Hct-27.7 / Hgb-9.1 / Plat-20 / RBC-3.03 / WBC-7.06  Serial CBC's  11-30 @ 04:30  Hct-28.2 / Hgb-9.3 / Plat-23 / RBC-3.03 / WBC-5.44  Serial CBC's  11-29 @ 13:50  Hct-26.4 / Hgb-8.8 / Plat-31 / RBC-2.86 / WBC-5.57      11-30    134<L>  |  98  |  4<L>  ----------------------------<  77  3.7   |  19  |  <0.5<L>    Ca    7.8<L>      30 Nov 2021 04:30  Mg     1.7     11-30    TPro  4.7<L>  /  Alb  2.3<L>  /  TBili  1.0  /  DBili  x   /  AST  34  /  ALT  18  /  AlkPhos  135<H>  11-30      PT/INR - ( 30 Nov 2021 04:30 )   PT: 15.80 sec;   INR: 1.38 ratio    PTT - ( 30 Nov 2021 04:30 )  PTT:29.5 sec      RADIOLOGY & ADDITIONAL STUDIES:    < from: CT Angio Abdomen and Pelvis w/ IV Cont (11.29.21 @ 21:55) >  IMPRESSION:      Large volume ascites as well as anasarca.    Moderate right and small left pleural effusions.    Right hepatic hyperdensities are noted, largest measuring 1.4 cm. Correlation with prior abdominal CT scans is recommended.    Rind of soft tissue surrounding proximal celiac artery as well as pancreatic ductal dilatation consistent with extension of neoplasm.    Soft tissue density within CBD stent with associated mild to moderate intrahepatic biliary ductal dilatation.    Evaluation for patency of portal venous structures is limited secondary to arterial phase of contrast administration. However, neither portal vein nor IVC aredilated.    --- End of Report ---   Patient is a 61y old  Female who presents with a chief complaint of thrombocytopenia (30 Nov 2021 10:16)      Subjective: Pt feels fine today apart from swelling. Denied any bleeding      Vital Signs Last 24 Hrs  T(C): 35.9 (01 Dec 2021 05:24), Max: 36.2 (30 Nov 2021 20:24)  T(F): 96.7 (01 Dec 2021 05:24), Max: 97.1 (30 Nov 2021 20:24)  HR: 79 (01 Dec 2021 05:24) (79 - 89)  BP: 143/84 (01 Dec 2021 05:24) (119/68 - 143/84)  BP(mean): --  RR: 18 (01 Dec 2021 05:24) (18 - 18)  SpO2: 96% (30 Nov 2021 12:47) (96% - 96%)    PHYSICAL EXAM  General: adult in NAD  HEENT: clear oropharynx, anicteric sclera, pink conjunctiva  Neck: supple  CV: normal S1/S2 with no murmur rubs or gallops  Lungs: positive air movement b/l ant lungs   Abdomen: soft non-tender distended  Ext: no clubbing cyanosis or edema  Skin: no rashes and no petechiae  Neuro: alert and oriented X 4, no focal deficits    MEDICATIONS  (STANDING):  furosemide   Injectable 40 milliGRAM(s) IV Push daily  pancrelipase  (CREON 36,000 Lipase Units) 1 Capsule(s) Oral three times a day with meals    MEDICATIONS  (PRN):      LABS:                          9.1    7.06  )-----------( 20       ( 30 Nov 2021 12:10 )             27.7         Mean Cell Volume : 91.4 fL  Mean Cell Hemoglobin : 30.0 pg  Mean Cell Hemoglobin Concentration : 32.9 g/dL  Auto Neutrophil # : 4.96 K/uL  Auto Lymphocyte # : 1.26 K/uL  Auto Monocyte # : 0.78 K/uL  Auto Eosinophil # : 0.00 K/uL  Auto Basophil # : 0.01 K/uL  Auto Neutrophil % : 70.4 %  Auto Lymphocyte % : 17.8 %  Auto Monocyte % : 11.0 %  Auto Eosinophil % : 0.0 %  Auto Basophil % : 0.1 %      Serial CBC's  11-30 @ 12:10  Hct-27.7 / Hgb-9.1 / Plat-20 / RBC-3.03 / WBC-7.06  Serial CBC's  11-30 @ 04:30  Hct-28.2 / Hgb-9.3 / Plat-23 / RBC-3.03 / WBC-5.44  Serial CBC's  11-29 @ 13:50  Hct-26.4 / Hgb-8.8 / Plat-31 / RBC-2.86 / WBC-5.57      11-30    134<L>  |  98  |  4<L>  ----------------------------<  77  3.7   |  19  |  <0.5<L>    Ca    7.8<L>      30 Nov 2021 04:30  Mg     1.7     11-30    TPro  4.7<L>  /  Alb  2.3<L>  /  TBili  1.0  /  DBili  x   /  AST  34  /  ALT  18  /  AlkPhos  135<H>  11-30      PT/INR - ( 30 Nov 2021 04:30 )   PT: 15.80 sec;   INR: 1.38 ratio    PTT - ( 30 Nov 2021 04:30 )  PTT:29.5 sec      RADIOLOGY & ADDITIONAL STUDIES:    < from: CT Angio Abdomen and Pelvis w/ IV Cont (11.29.21 @ 21:55) >  IMPRESSION:      Large volume ascites as well as anasarca.    Moderate right and small left pleural effusions.    Right hepatic hyperdensities are noted, largest measuring 1.4 cm. Correlation with prior abdominal CT scans is recommended.    Rind of soft tissue surrounding proximal celiac artery as well as pancreatic ductal dilatation consistent with extension of neoplasm.    Soft tissue density within CBD stent with associated mild to moderate intrahepatic biliary ductal dilatation.    Evaluation for patency of portal venous structures is limited secondary to arterial phase of contrast administration. However, neither portal vein nor IVC aredilated.    --- End of Report ---

## 2021-12-01 NOTE — ASSESSMENT
[FreeTextEntry1] : 61 year old female with advanced pancreatic cancer with obstructive jaundice , portal vein encasement  and dilated CBD s/p metallic stent . splenomegaly and suspected varices. ECOG 2 to 3 .\par S/P FOlFIRINOX X 5 poorly tolerated due to severe diarrhea, hypokalemia, and marked weight loss. Started on Gemcitibine s/p Cycle 1 with delayed D8 due to thrombocytopenia \par \par Plan: \par -- CBC reviewed: PLTS: 34 new anasarca. Will hold chemotherapy and admit to ED to r/o tumor obstruction vs thrombosis )\par -- Will hold treatment today. Advised to follow up in ED for acute evaluation. Will proceed accordingly. \par \par Patient seen and examined with Dr. Hayward who agrees with plan of care.\par \par \par

## 2021-12-01 NOTE — PROGRESS NOTE ADULT - SUBJECTIVE AND OBJECTIVE BOX
BUSHRA CAST 61y Female  MRN#: [id number]   CODE STATUS: Full code    Hospital Day: 2d    Pt is currently admitted with the primary diagnosis of thrombocytopenia, anasarca     SUBJECTIVE  Hospital Course    62 y/o female with a PMHx of metastatic pancreatic cancer on chemotherapy (Dx 2021, on Gemcitabine following Dr. Hayward) complicated by splenomegaly and varices (splenic/portal vein thrombosis), HTN presented to the ED from oncologist office to rule out tumor obstruction vs thrombosis given new anascarca and thrombocytopenia. Patient reports that for past 2-3 weeks she noticed abdominal swelling, and LE swelling. She was started on Lasix 40mg po as outpatient, while it makes her urinate alot, she reports has not improved her abdominal swelling. She denies any SOB, abdominal pain, nausea, vomiting, diarrhea, constipation, fevers, chills. She also denies any melena, BRBPR, hematemesis, epistaxis or hematuria.     In the ED, VS noted for T 96.7, HR 84, /91. Labs noted for WBC 5, Hg 8.8, Plt 31. INR 1.37, . CT abd/pelv shows large volume ascites and anasarca, moderate right and small left pleural effusions. Right hepatic hyperdensities largest 1.4cm. Ring of soft tissue surrounding proximal celiac artery consistent with extension of neoplasm. Portal vein nor IVC are dilated.     Patient was admitted to medicine for further assessment and management  : Patient is planned to get CT with oral and IV contrast to assess venous phase as previous CT was limited, giving one unit of platelets then Paracentesis this afternoon  Will check retic count, vit b12, and folate per heme/onc; holding chemotherapy for now                                            ----------------------------------------------------------  OBJECTIVE  PAST MEDICAL & SURGICAL HISTORY  HTN (hypertension)    Pancreatic cancer    Single delivery by  section                                              -----------------------------------------------------------  ALLERGIES:  No Known Allergies                                            ------------------------------------------------------------    HOME MEDICATIONS  Home Medications:  Lasix 40 mg oral tablet: 1 tab(s) orally once a day (2021 04:15)                           MEDICATIONS:  STANDING MEDICATIONS  furosemide   Injectable 40 milliGRAM(s) IV Push daily  iohexol 300 mG (iodine)/mL Oral Solution 30 milliLiter(s) Oral once  pancrelipase  (CREON 36,000 Lipase Units) 1 Capsule(s) Oral three times a day with meals  potassium chloride   Powder 40 milliEquivalent(s) Oral every 4 hours    PRN MEDICATIONS                                            ------------------------------------------------------------  VITAL SIGNS: Last 24 Hours  T(C): 35.9 (01 Dec 2021 05:24), Max: 36.2 (2021 20:24)  T(F): 96.7 (01 Dec 2021 05:24), Max: 97.1 (2021 20:24)  HR: 79 (01 Dec 2021 05:24) (79 - 89)  BP: 143/84 (01 Dec 2021 05:24) (119/68 - 143/84)  BP(mean): --  RR: 18 (01 Dec 2021 05:24) (18 - 18)  SpO2: --      21 @ 07:01  -  21 @ 07:00  --------------------------------------------------------  IN: 0 mL / OUT: 300 mL / NET: -300 mL                                             --------------------------------------------------------------  LABS:                        9.0    5.93  )-----------( 28       ( 01 Dec 2021 06:51 )             27.3         134<L>  |  99  |  7<L>  ----------------------------<  83  3.2<L>   |  18  |  <0.5<L>    Ca    7.8<L>      01 Dec 2021 06:51  Phos  3.1       Mg     1.9         TPro  4.5<L>  /  Alb  2.3<L>  /  TBili  0.9  /  DBili  x   /  AST  38  /  ALT  19  /  AlkPhos  142<H>      PT/INR - ( 2021 04:30 )   PT: 15.80 sec;   INR: 1.38 ratio         PTT - ( 2021 04:30 )  PTT:29.5 sec                                                          -------------------------------------------------------------  RADIOLOGY:    < from: CT Angio Abdomen and Pelvis w/ IV Cont (21 @ 21:55) >  Large volume ascites as well as anasarca.    Moderate right and small left pleural effusions.    Right hepatic hyperdensities are noted, largest measuring 1.4 cm. Correlation with prior abdominal CT scans is recommended.    Rind of soft tissue surrounding proximal celiac artery as well as pancreatic ductal dilatation consistent with extension of neoplasm.    Soft tissue density within CBD stent with associated mild to moderate intrahepatic biliary ductal dilatation.    Evaluation for patency of portal venous structures is limited secondary to arterial phase of contrast administration. However, neither portal vein nor IVC aredilated.      < end of copied text >                                              --------------------------------------------------------------    PHYSICAL EXAM:  GENERAL: NAD, lying in bed comfortably, fraile, cachectic   HEAD:  Atraumatic, Normocephalic  EYES: EOMI, PERRLA, conjunctiva and sclera clear  CHEST/LUNG: Clear to auscultation bilaterally; No rales, rhonchi, wheezing, or rubs.   HEART: Regular rate and rhythm; No murmurs, rubs, or gallops  ABDOMEN: BSx4; largely distended, no tenderness to palpation  EXTREMITIES:  2+ Peripheral Pulses, brisk capillary refill. 3+ pitting edema bilateral LE  NERVOUS SYSTEM:  A&Ox3, no focal deficits                                             --------------------------------------------------------------    ASSESSMENT & PLAN    62 y/o female with a PMHx of metastatic pancreatic cancer on chemotherapy (Dx 2021, on Gemcitabine following Dr. Hayward) complicated by splenomegaly and varices (splenic/portal vein thrombosis), HTN, DM presented to the ED from oncologist office to rule out tumor obstruction vs thrombosis given new anascarca and thrombocytopenia. Patient reports that for past 2-3 weeks she noticed abdominal swelling, and LE swelling. She was started on Lasix 40mg po as outpatient, while it makes her urinate a lot, she reports has not improved her abdominal swelling.    #Thrombocytopenia with anemia   #Metastatic pancreatic cancer on chemotherapy   - CT a/p w IV con : Portal venous structure evaluation limited. Rind of soft tissue surrounding proximal celiac artery as well as pancreatic ductal dilatation consistent with extension of neoplasm. Soft tissue density within CBD stent with associated mild to moderate intrahepatic biliary ductal dilatation.  - completed 5 cycles of Foifininox which she was not able to tolerate well due to severe diarrhea, hypokalemia and weight loss  - switched to Gemcitabine completed 1 cycle with day 8 delayed due to thrombocytopenia  - suppose to start cycle 2, but held due to plt 31  - hold chem DVT PPx give plt <50K  - keep active T&S, transfuse if plt <10K or active bleeding   - F/U reticulocyte count, vit b12, folate  - F/U Venous phase CT a/p w IV and oral contrast    #Large Volume Ascites with Anasarca  - on po Lasix 40mg at home, continue with Lasix 40mg IV  - Giving 1 unit of platelets today , then paracentesis in afternoon  - no CT evidence of PV thrombosis or tumor obstruction     #Hx of HTN  - controlled off medications    #DVT PPx: SCD, hold chem   #GI PPx: not indicated  #DASH

## 2021-12-01 NOTE — PHYSICAL EXAM
[Restricted in physically strenuous activity but ambulatory and able to carry out work of a light or sedentary nature] : Status 1- Restricted in physically strenuous activity but ambulatory and able to carry out work of a light or sedentary nature, e.g., light house work, office work [Obese] : obese [Normal] : no peripheral adenopathy appreciated [de-identified] : + 3 B/L LE pitting edema  [de-identified] : distended/ pitting

## 2021-12-01 NOTE — REVIEW OF SYSTEMS
[Fatigue] : fatigue [Lower Ext Edema] : lower extremity edema [Negative] : Neurological [FreeTextEntry7] : distension/ pitting anasarca

## 2021-12-01 NOTE — HISTORY OF PRESENT ILLNESS
[de-identified] : 61 year old white female self referred with advanced pancreatic cancer presents for second opinion and to transfer her oncologic care . PMH is significant for hypertension , diagnosed with metastatic pancreatic cancer in May 2021 when she presented with diarrhea , indigestion and  progressive weight loss. PET scan showed mass involving neck and body of pancreas with regional lymph node encasing SMA in addition to left para aortic node in mid abdomen . splenomegaly and suspected early varices suggestive of splenic/portal vein thrombosis . biliary duct dilatation due to peripancreatic and and [place] hepatis adenopathy . \par EUS with biopsy confirmed pancreatic adenocarcinoma , she was found with involvement of portal vein and CBD . ERCP was done with placement of metallic stent . \par She has received this far 5 cycles of chemotherapy ( likely FOLFIRINOX ) complicated with severe diarrhea , electrolyte imbalance , partially controlled with lomotil , stopped due to dizziness and loss of appetite . She lost nearly 40 lbs and complains of generalized weakness, no fever, abdominal or back pain , no itching . She takes pancreatic enzymes one with meals , ensure one daily in addition to mineral supplements . She ambulates without assistance and is  capable of limited self care .\par Meds : kcl , pancreatic enzymes . ( HCTZ was discontinued )  [de-identified] : 11/29/2021: BUSHRA is here for follow up visit for advanced pancreatic cancer S/P FOlFIRINOX X 5 poorly tolerated due to severe diarrhea, hypokalemia, and marked weight loss. Recieved 1 cycle of Gemcitabine with D8 delayed due to thrombocytopenia. She is here today for cycle 2 of treatment. Patient noted to have + 3 pitting edema of the B/L LE and abdominal distention with areas of pitting suggestive of obstruction (tumor burden vs thrombosis) CT of Abd from 10/12/21 showed encasement of the celiac artery, common hepatic, proper hepatic, and right hepatic splenic arteries; vessels remained patent at that time. Main portal vein viewed narrow. Patient denies SOB, cough, leg pain, unusual bleeding or bruising, or abdominal pain at this time. CBC reviewed thrombocytopenia noted.

## 2021-12-02 NOTE — PROGRESS NOTE ADULT - SUBJECTIVE AND OBJECTIVE BOX
Patient is a 61y old  Female who presents with a chief complaint of thrombocytopenia (02 Dec 2021 13:33)      Subjective: pt feels well after paracentesis.       Vital Signs Last 24 Hrs  T(C): 36.1 (02 Dec 2021 14:08), Max: 36.9 (01 Dec 2021 21:10)  T(F): 96.9 (02 Dec 2021 14:08), Max: 98.4 (01 Dec 2021 21:10)  HR: 115 (02 Dec 2021 14:08) (101 - 115)  BP: 118/79 (02 Dec 2021 14:08) (107/65 - 125/75)  RR: 20 (02 Dec 2021 14:08) (20 - 20)  SpO2: 98% (02 Dec 2021 05:36) (98% - 98%)    PHYSICAL EXAM  General: adult in NAD  HEENT: clear oropharynx, anicteric sclera, pink conjunctiva  Neck: supple  CV: normal S1/S2 with no murmur rubs or gallops  Lungs: positive air movement b/l ant lungs   Abdomen: soft non-tender distended  Ext: no clubbing cyanosis or edema  Skin: no rashes and no petechiae  Neuro: alert and oriented X 4, no focal deficits    MEDICATIONS  (STANDING):  pancrelipase  (CREON 36,000 Lipase Units) 1 Capsule(s) Oral three times a day with meals  potassium chloride   Powder 40 milliEquivalent(s) Oral every 4 hours  potassium chloride  20 mEq/100 mL IVPB 20 milliEquivalent(s) IV Intermittent every 2 hours  spironolactone 50 milliGRAM(s) Oral daily    MEDICATIONS  (PRN):      LABS:                          7.6    12.00 )-----------( 58       ( 02 Dec 2021 11:00 )             22.3         Mean Cell Volume : 92.1 fL  Mean Cell Hemoglobin : 31.4 pg  Mean Cell Hemoglobin Concentration : 34.1 g/dL  Auto Neutrophil # : x  Auto Lymphocyte # : x  Auto Monocyte # : x  Auto Eosinophil # : x  Auto Basophil # : x  Auto Neutrophil % : x  Auto Lymphocyte % : x  Auto Monocyte % : x  Auto Eosinophil % : x  Auto Basophil % : x      Serial CBC's  12-02 @ 11:00  Hct-22.3 / Hgb-7.6 / Plat-58 / RBC-2.42 / WBC-12.00  Serial CBC's  12-02 @ 08:00  Hct-22.2 / Hgb-7.5 / Plat-47 / RBC-2.42 / WBC-10.71  Serial CBC's  12-01 @ 06:51  Hct-27.3 / Hgb-9.0 / Plat-28 / RBC-2.93 / WBC-5.93  Serial CBC's  11-30 @ 12:10  Hct-27.7 / Hgb-9.1 / Plat-20 / RBC-3.03 / WBC-7.06  Serial CBC's  11-30 @ 04:30  Hct-28.2 / Hgb-9.3 / Plat-23 / RBC-3.03 / WBC-5.44  Serial CBC's  11-29 @ 13:50  Hct-26.4 / Hgb-8.8 / Plat-31 / RBC-2.86 / WBC-5.57      12-02    138  |  100  |  5<L>  ----------------------------<  89  2.9<L>   |  22  |  <0.5<L>    Ca    7.5<L>      02 Dec 2021 08:00  Phos  3.0     12-02  Mg     1.8     12-02    TPro  4.1<L>  /  Alb  2.4<L>  /  TBili  1.1  /  DBili  x   /  AST  41  /  ALT  17  /  AlkPhos  229<H>  12-02          Reticulocyte Percent: 4.2 % (12-02 @ 08:00)    Culture - Fungal, Body Fluid (collected 01 Dec 2021 14:12)  Source: .Body Fluid Peritoneal Fluid  Preliminary Report (02 Dec 2021 11:29):    Testing in progress    Culture - Body Fluid with Gram Stain (collected 01 Dec 2021 14:12)  Source: .Body Fluid Peritoneal Fluid  Gram Stain (02 Dec 2021 02:36):    polymorphonuclear leukocytes seen    No organisms seen    by cytocentrifuge  Preliminary Report (02 Dec 2021 16:39):    No growth      RADIOLOGY & ADDITIONAL STUDIES:    < from: CT Abdomen and Pelvis w/ Oral Cont and w/ IV Cont (12.01.21 @ 17:41) >  FINDINGS:    LOWER CHEST: Small bilateral pleural effusions with overlying atelectasis, right greater than left.    LIVER: Hepatic parenchyma is grossly unremarkable.    SPLEEN: Unremarkable.    PANCREAS: Redemonstrated is pancreatic atrophy and main duct dilatation to 8 mm which abruptly terminates in the region of the portal confluence. Redemonstration of soft tissue attenuation surrounding the region of branching at the celiac axis and superior aspect of the SMA. There is severe attenuation of the portal confluence with patent SMV and proximal jejunal branches. Trace thrombus is noted in the main portal vein (5/152) immediately superior to the severely attenuated portal confluence which courses through the region of pancreatic soft tissue attenuation (5/167). Splenic vein appears occluded just proximal to the portal confluence. Left upper quadrant collateral vessels are noted. Left and right portal veins are patent.    GALLBLADDER AND BILIARY TREE: Cholelithiasis and gallbladder wall edema. Mild intrahepatic biliary ductal dilatation and pneumobilia. Limited ability to compare biliary duct dilatation to prior given differences in technique. A CBD stent is present.    ADRENALS: Unremarkable.    KIDNEYS: Symmetric pattern of renal enhancement. No hydronephrosis.    LYMPH NODES: No obvious lymphadenopathy by size    VASCULATURE: Normal caliber abdominal aorta with atherosclerotic changes. Portal circulation as above    BOWEL: No evidence for bowel obstruction. Simple appearing fluid collections at the gastric fundus, possibly serosal.    PERITONEUM/RETROPERITONEUM/MESENTERY: Moderate abdominal ascites. No pneumoperitoneum    PELVIC VISCERA: Unremarkable.    BONES AND SOFT TISSUES: Diffuse subcutaneous soft tissue edema and focal subcutaneous skin defect at the left lower quadrant anterior abdominal wall.      IMPRESSION:    Severely attenuated barely perceptible portal confluence, presumably secondary to adjacent soft tissue attenuation. 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 A small subcentimeter focus of thrombus is noted in the main portal vein superior to the splenic confluence with patent left and right main portal veins. Patent SMV and proximal jejunal branches. Left upper quadrant collateral vessels are noted.    Redemonstrated anasarca and additional findings.    < end of copied text >

## 2021-12-02 NOTE — PROGRESS NOTE ADULT - SUBJECTIVE AND OBJECTIVE BOX
BUSHRA CAST 61y Female  MRN#: [id number]   CODE STATUS: Full code    Hospital Day: 3d    Pt is currently admitted with the primary diagnosis of thrombocytopenia, anasarca     SUBJECTIVE  Hospital Course    60 y/o female with a PMHx of metastatic pancreatic cancer on chemotherapy (Dx 2021, on Gemcitabine following Dr. Hayward) complicated by splenomegaly and varices (splenic/portal vein thrombosis), HTN presented to the ED from oncologist office to rule out tumor obstruction vs thrombosis given new anascarca and thrombocytopenia. Patient reports that for past 2-3 weeks she noticed abdominal swelling, and LE swelling. She was started on Lasix 40mg po as outpatient, while it makes her urinate alot, she reports has not improved her abdominal swelling. She denies any SOB, abdominal pain, nausea, vomiting, diarrhea, constipation, fevers, chills. She also denies any melena, BRBPR, hematemesis, epistaxis or hematuria.     In the ED, VS noted for T 96.7, HR 84, /91. Labs noted for WBC 5, Hg 8.8, Plt 31. INR 1.37, . CT abd/pelv shows large volume ascites and anasarca, moderate right and small left pleural effusions. Right hepatic hyperdensities largest 1.4cm. Ring of soft tissue surrounding proximal celiac artery consistent with extension of neoplasm. Portal vein nor IVC are dilated.     Patient was admitted to medicine for further assessment and management  : Patient is planned to get CT with oral and IV contrast to assess venous phase as previous CT was limited, giving one unit of platelets then Paracentesis this afternoon  Will check retic count, vit b12, and folate per heme/onc; holding chemotherapy for now  : Thrombus seen on CT in main portal vein                                            ----------------------------------------------------------  OBJECTIVE  PAST MEDICAL & SURGICAL HISTORY  HTN (hypertension)    Pancreatic cancer    Single delivery by  section                                              -----------------------------------------------------------  ALLERGIES:  No Known Allergies                                            ------------------------------------------------------------    HOME MEDICATIONS  MEDICATIONS  (STANDING):  furosemide   Injectable 40 milliGRAM(s) IV Push daily  pancrelipase  (CREON 36,000 Lipase Units) 1 Capsule(s) Oral three times a day with meals  potassium chloride   Powder 40 milliEquivalent(s) Oral every 4 hours  spironolactone 50 milliGRAM(s) Oral daily    MEDICATIONS  (PRN):                                              ------------------------------------------------------------  VITAL SIGNS: Last 24 Hours  Vital Signs Last 24 Hrs  T(C): 36.7 (02 Dec 2021 05:36), Max: 36.9 (01 Dec 2021 21:10)  T(F): 98 (02 Dec 2021 05:36), Max: 98.4 (01 Dec 2021 21:10)  HR: 110 (02 Dec 2021 05:36) (97 - 110)  BP: 107/65 (02 Dec 2021 05:36) (104/68 - 125/75)  BP(mean): --  RR: 20 (02 Dec 2021 05:36) (20 - 20)  SpO2: 98% (02 Dec 2021 05:36) (98% - 98%)      21 @ 07:01  -  21 @ 07:00  --------------------------------------------------------  IN: 0 mL / OUT: 300 mL / NET: -300 mL                                             --------------------------------------------------------------  LABS:                                   7.6    12.00 )-----------( 58       ( 02 Dec 2021 11:00 )             22.3     12-02    138  |  100  |  5<L>  ----------------------------<  89  2.9<L>   |  22  |  <0.5<L>    Ca    7.5<L>      02 Dec 2021 08:00  Phos  3.0       Mg     1.8     12    TPro  4.1<L>  /  Alb  2.4<L>  /  TBili  1.1  /  DBili  x   /  AST  41  /  ALT  17  /  AlkPhos  229<H>  1202                                                  -------------------------------------------------------------  RADIOLOGY:    < from: CT Abdomen and Pelvis w/ Oral Cont and w/ IV Cont (21 @ 17:41) >  Severely attenuated barely perceptible portal confluence, presumably secondary to adjacent soft tissue attenuation. 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 A small subcentimeter focus of thrombus is noted in the main portal vein superior to the splenic confluence with patent left and right main portal veins. Patent SMV and proximal jejunal branches. Left upper quadrant collateral vessels are noted.    Redemonstrated anasarca and additional findings.    < end of copied text >      < from: CT Angio Abdomen and Pelvis w/ IV Cont (21 @ 21:55) >  Large volume ascites as well as anasarca.    Moderate right and small left pleural effusions.    Right hepatic hyperdensities are noted, largest measuring 1.4 cm. Correlation with prior abdominal CT scans is recommended.    Rind of soft tissue surrounding proximal celiac artery as well as pancreatic ductal dilatation consistent with extension of neoplasm.    Soft tissue density within CBD stent with associated mild to moderate intrahepatic biliary ductal dilatation.    Evaluation for patency of portal venous structures is limited secondary to arterial phase of contrast administration. However, neither portal vein nor IVC aredilated.      < end of copied text >                                              --------------------------------------------------------------    PHYSICAL EXAM:  GENERAL: NAD, lying in bed comfortably, fraile, cachectic   HEAD:  Atraumatic, Normocephalic  EYES: EOMI, PERRLA, conjunctiva and sclera clear  CHEST/LUNG: Clear to auscultation bilaterally; No rales, rhonchi, wheezing, or rubs.   HEART: Regular rate and rhythm; No murmurs, rubs, or gallops  ABDOMEN: BSx4; largely distended, no tenderness to palpation  EXTREMITIES:  2+ Peripheral Pulses, brisk capillary refill. 3+ pitting edema bilateral LE  NERVOUS SYSTEM:  A&Ox3, no focal deficits                                             --------------------------------------------------------------    ASSESSMENT & PLAN    60 y/o female with a PMHx of metastatic pancreatic cancer on chemotherapy (Dx 2021, on Gemcitabine following Dr. Hayward) complicated by splenomegaly and varices (splenic/portal vein thrombosis), HTN, DM presented to the ED from oncologist office to rule out tumor obstruction vs thrombosis given new anascarca and thrombocytopenia. Patient reports that for past 2-3 weeks she noticed abdominal swelling, and LE swelling. She was started on Lasix 40mg po as outpatient, while it makes her urinate a lot, she reports has not improved her abdominal swelling.    #Thrombocytopenia with anemia   #Metastatic pancreatic cancer on chemotherapy   - CT a/p w IV con : Portal venous structure evaluation limited. Rind of soft tissue surrounding proximal celiac artery as well as pancreatic ductal dilatation consistent with extension of neoplasm. Soft tissue density within CBD stent with associated mild to moderate intrahepatic biliary ductal dilatation.  - completed 5 cycles of Foifininox which she was not able to tolerate well due to severe diarrhea, hypokalemia and weight loss  - switched to Gemcitabine completed 1 cycle with day 8 delayed due to thrombocytopenia  - suppose to start cycle 2, but held due to plt 31  - hold chem DVT PPx give plt <50K  - keep active T&S, transfuse if plt <10K or active bleeding   - F/U reticulocyte count, vit b12, folate  - CT A/P w IV and oral contrast : thrombus is noted in the main portal vein    #Large Volume Ascites with Anasarca  - on po Lasix 40mg at home, continue with Lasix 40mg IV  - S/P 1 unit of platelets , Paracentesis removed 6.7L  - no CT evidence of PV thrombosis or tumor obstruction     #Hx of HTN  - controlled off medications    #DVT PPx: SCD, hold chem   #GI PPx: not indicated  #DASH      BUSHRA CAST 61y Female  MRN#: [id number]   CODE STATUS: Full code    Hospital Day: 3d    Pt is currently admitted with the primary diagnosis of thrombocytopenia, anasarca     SUBJECTIVE  Hospital Course    62 y/o female with a PMHx of metastatic pancreatic cancer on chemotherapy (Dx 2021, on Gemcitabine following Dr. Hayward) complicated by splenomegaly and varices (splenic/portal vein thrombosis), HTN presented to the ED from oncologist office to rule out tumor obstruction vs thrombosis given new anascarca and thrombocytopenia. Patient reports that for past 2-3 weeks she noticed abdominal swelling, and LE swelling. She was started on Lasix 40mg po as outpatient, while it makes her urinate alot, she reports has not improved her abdominal swelling. She denies any SOB, abdominal pain, nausea, vomiting, diarrhea, constipation, fevers, chills. She also denies any melena, BRBPR, hematemesis, epistaxis or hematuria.     In the ED, VS noted for T 96.7, HR 84, /91. Labs noted for WBC 5, Hg 8.8, Plt 31. INR 1.37, . CT abd/pelv shows large volume ascites and anasarca, moderate right and small left pleural effusions. Right hepatic hyperdensities largest 1.4cm. Ring of soft tissue surrounding proximal celiac artery consistent with extension of neoplasm. Portal vein nor IVC are dilated.     Patient was admitted to medicine for further assessment and management  : Patient is planned to get CT with oral and IV contrast to assess venous phase as previous CT was limited, giving one unit of platelets then Paracentesis this afternoon  Will check retic count, vit b12, and folate per heme/onc; holding chemotherapy for now  : Thrombus seen on CT in main portal vein, started Aldactone 50                                            ----------------------------------------------------------  OBJECTIVE  PAST MEDICAL & SURGICAL HISTORY  HTN (hypertension)    Pancreatic cancer    Single delivery by  section                                              -----------------------------------------------------------  ALLERGIES:  No Known Allergies                                            ------------------------------------------------------------    HOME MEDICATIONS  MEDICATIONS  (STANDING):  furosemide   Injectable 40 milliGRAM(s) IV Push daily  pancrelipase  (CREON 36,000 Lipase Units) 1 Capsule(s) Oral three times a day with meals  potassium chloride   Powder 40 milliEquivalent(s) Oral every 4 hours  spironolactone 50 milliGRAM(s) Oral daily    MEDICATIONS  (PRN):                                              ------------------------------------------------------------  VITAL SIGNS: Last 24 Hours  Vital Signs Last 24 Hrs  T(C): 36.7 (02 Dec 2021 05:36), Max: 36.9 (01 Dec 2021 21:10)  T(F): 98 (02 Dec 2021 05:36), Max: 98.4 (01 Dec 2021 21:10)  HR: 110 (02 Dec 2021 05:36) (97 - 110)  BP: 107/65 (02 Dec 2021 05:36) (104/68 - 125/75)  BP(mean): --  RR: 20 (02 Dec 2021 05:36) (20 - 20)  SpO2: 98% (02 Dec 2021 05:36) (98% - 98%)      21 @ 07:01  -  21 @ 07:00  --------------------------------------------------------  IN: 0 mL / OUT: 300 mL / NET: -300 mL                                             --------------------------------------------------------------  LABS:                                   7.6    12.00 )-----------( 58       ( 02 Dec 2021 11:00 )             22.3     12-02    138  |  100  |  5<L>  ----------------------------<  89  2.9<L>   |  22  |  <0.5<L>    Ca    7.5<L>      02 Dec 2021 08:00  Phos  3.0       Mg     1.8         TPro  4.1<L>  /  Alb  2.4<L>  /  TBili  1.1  /  DBili  x   /  AST  41  /  ALT  17  /  AlkPhos  229<H>                                                    -------------------------------------------------------------  RADIOLOGY:    < from: CT Abdomen and Pelvis w/ Oral Cont and w/ IV Cont (21 @ 17:41) >  Severely attenuated barely perceptible portal confluence, presumably secondary to adjacent soft tissue attenuation. 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 A small subcentimeter focus of thrombus is noted in the main portal vein superior to the splenic confluence with patent left and right main portal veins. Patent SMV and proximal jejunal branches. Left upper quadrant collateral vessels are noted.    Redemonstrated anasarca and additional findings.    < end of copied text >      < from: CT Angio Abdomen and Pelvis w/ IV Cont (21 @ 21:55) >  Large volume ascites as well as anasarca.    Moderate right and small left pleural effusions.    Right hepatic hyperdensities are noted, largest measuring 1.4 cm. Correlation with prior abdominal CT scans is recommended.    Rind of soft tissue surrounding proximal celiac artery as well as pancreatic ductal dilatation consistent with extension of neoplasm.    Soft tissue density within CBD stent with associated mild to moderate intrahepatic biliary ductal dilatation.    Evaluation for patency of portal venous structures is limited secondary to arterial phase of contrast administration. However, neither portal vein nor IVC aredilated.      < end of copied text >                                              --------------------------------------------------------------    PHYSICAL EXAM:  GENERAL: NAD, lying in bed comfortably, fraile, cachectic   HEAD:  Atraumatic, Normocephalic  EYES: EOMI, PERRLA, conjunctiva and sclera clear  CHEST/LUNG: Clear to auscultation bilaterally; No rales, rhonchi, wheezing, or rubs.   HEART: Regular rate and rhythm; No murmurs, rubs, or gallops  ABDOMEN: BSx4; largely distended, no tenderness to palpation  EXTREMITIES:  2+ Peripheral Pulses, brisk capillary refill. 3+ pitting edema bilateral LE  NERVOUS SYSTEM:  A&Ox3, no focal deficits                                             --------------------------------------------------------------    ASSESSMENT & PLAN    62 y/o female with a PMHx of metastatic pancreatic cancer on chemotherapy (Dx 2021, on Gemcitabine following Dr. Hayward) complicated by splenomegaly and varices (splenic/portal vein thrombosis), HTN, DM presented to the ED from oncologist office to rule out tumor obstruction vs thrombosis given new anascarca and thrombocytopenia. Patient reports that for past 2-3 weeks she noticed abdominal swelling, and LE swelling. She was started on Lasix 40mg po as outpatient, while it makes her urinate a lot, she reports has not improved her abdominal swelling.    #Thrombocytopenia with anemia   #Metastatic pancreatic cancer on chemotherapy   - CT a/p w IV con : Portal venous structure evaluation limited. Rind of soft tissue surrounding proximal celiac artery as well as pancreatic ductal dilatation consistent with extension of neoplasm. Soft tissue density within CBD stent with associated mild to moderate intrahepatic biliary ductal dilatation.  - completed 5 cycles of Foifininox which she was not able to tolerate well due to severe diarrhea, hypokalemia and weight loss  - switched to Gemcitabine completed 1 cycle with day 8 delayed due to thrombocytopenia  - suppose to start cycle 2, but held due to plt 31  - hold chem DVT PPx give plt <50K  - keep active T&S, transfuse if plt <10K or active bleeding   - F/U reticulocyte count, vit b12, folate  - CT A/P w IV and oral contrast : thrombus is noted in the main portal vein    #Large Volume Ascites with Anasarca  - on po Lasix 40mg at home, continue with Lasix 40mg IV  - S/P 1 unit of platelets , Paracentesis removed 6.7L  - no CT evidence of PV thrombosis or tumor obstruction   - Started aldactone     #Hx of HTN  - controlled off medications    #DVT PPx: SCD, hold chem   #GI PPx: not indicated  #DASH

## 2021-12-02 NOTE — PROGRESS NOTE ADULT - ATTENDING COMMENTS
Ruddy Siegel Spouse - [phone]    Secondary Decision Maker: Solomon Farris - Child, Legal Guardian - [phone]      
60 y/o female with a PMHx of metastatic pancreatic cancer on chemotherapy (Dx 7/2021, on Gemcitabine following Dr. Hayward) complicated by splenomegaly and varices (splenic/portal vein thrombosis), HTN, DM presented to the ED with worsening ascites.     #Thrombocytopenia with anemia   #Metastatic pancreatic cancer on chemotherapy   - CT a/p w IV con 11/29: Portal venous structure evaluation limited. Rind of soft tissue surrounding proximal celiac artery as well as pancreatic ductal dilatation consistent with extension of neoplasm. Soft tissue density within CBD stent with associated mild to moderate intrahepatic biliary ductal dilatation.  - completed 5 cycles which she was not able to tolerate well due to severe diarrhea, hypokalemia and weight loss  - switched to Gemcitabine completed 1 cycle with day 8 delayed due to thrombocytopenia  - suppose to start cycle 2, but held due to plt 31  - hold chem DVT PPx give plt <50K  - keep active T&S, transfuse if plt <10K or active bleeding   - F/U reticulocyte count, vit b12, folate  - F/U Venous phase CT a/p w IV and oral contrast    #Large Volume Ascites with Anasarca  - on po Lasix 40mg at home, continue with Lasix 40mg IV  - s/p paracentesis   - no CT evidence of PV thrombosis or tumor obstruction     #Hx of HTN  - controlled off medications    #DVT PPx: SCD, hold chem     pending: paracentesis and CT abd
60 y/o female with a PMHx of metastatic pancreatic cancer on chemotherapy (Dx 7/2021, on Gemcitabine following Dr. Hayward) complicated by splenomegaly and varices (splenic/portal vein thrombosis), HTN, DM presented to the ED with worsening ascites.     #Thrombocytopenia with anemia   #Metastatic pancreatic cancer on chemotherapy   - CT a/p w IV con 11/29: Portal venous structure evaluation limited. Rind of soft tissue surrounding proximal celiac artery as well as pancreatic ductal dilatation consistent with extension of neoplasm. Soft tissue density within CBD stent with associated mild to moderate intrahepatic biliary ductal dilatation.  - completed 5 cycles which she was not able to tolerate well due to severe diarrhea, hypokalemia and weight loss  - switched to Gemcitabine completed 1 cycle with day 8 delayed due to thrombocytopenia  - suppose to start cycle 2, but held due to thrombocytopenia   - hold chem DVT PPx give plt <50K  - keep active T&S, transfuse if plt <10K or active bleeding     #Worsening anemia   - rule out abd wall hematoma (s/p paracentesis yesterday)  - trend cbc and check ct abd     #Large Volume Ascites with Anasarca  - hold lasix for now, start aldactone  - s/p paracentesis     #Hypokalemia   - replete, hold Lasix     #Hx of HTN  - controlled off medications    #DVT PPx: SCD     pending:  ct abd   spoke with patient and 
The patient was seen. Agree with above.  s/p paracentesis. About 7 L fluid was removed. Feeling better.  Labs reviewed. PLT 58K, improving.  Continue supportive care.
The patient was seen. Agree with above.  60 yo female with metastatic pancreatic cancer was admitted for abdominal distention, large ascites and thrombocytopenia due to chemotherapy.  Supportive care. Transfuse PLT if < 20K or sign of bleeding.  Therapeutic paracentesis.   Hold Gemzar.

## 2021-12-02 NOTE — PROGRESS NOTE ADULT - ASSESSMENT
61 year old white female diagnosed with metastatic pancreatic cancer in May 2021 when she presented with diarrhea , indigestion and progressive weight loss. PET scan showed mass involving neck and body of pancreas with regional lymph node encasing SMA in addition to left para aortic node in mid abdomen. splenomegaly and suspected early varices suggestive of splenic/portal vein thrombosis. biliary duct dilatation due to peripancreatic and and orlando hepatis adenopathy.   EUS with biopsy confirmed pancreatic adenocarcinoma , she was found with involvement of portal vein and CBD. ERCP was done with placement of metallic stent.   She has received this far 5 cycles of chemotherapy (likely FOLFIRINOX ) complicated with severe diarrhea , electrolyte imbalance , partially controlled with lomotil , stopped due to dizziness and loss of appetite. She lost nearly 40 lbs and complains of generalized weakness, no fever, abdominal or back pain , no itching. She takes pancreatic enzymes one with meals , ensure one daily in addition to mineral supplements. She ambulates without assistance and is capable of limited self care.  Meds : kcl , pancreatic enzymes. ( HCTZ was discontinued ).   On 11/29/2021 patient presented to Dr. Ferrers at the [facility]. She had previously recieved 1 cycle of Gemcitabine with D8 delayed due to thrombocytopenia. She came on the 29th for cycle 2 of treatment. Patient noted to have + 3 pitting edema of the B/L LE and abdominal distention with areas of pitting suggestive of obstruction (tumor burden vs thrombosis) CT of Abd from 10/12/21 showed encasement of the celiac artery, common hepatic, proper hepatic, and right hepatic splenic arteries; vessels remained patent at that time. Main portal vein viewed narrow. Patient denies SOB, cough, leg pain, unusual bleeding or bruising, or abdominal pain at this time. CBC reviewed thrombocytopenia noted.   Admitted to the ED, CT scan showed:  Large volume ascites as well as anasarca.  Moderate right and small left pleural effusions.  Right hepatic hyperdensities are noted, largest measuring 1.4 cm. Correlation with prior abdominal CT scans is recommended.  Rind of soft tissue surrounding proximal celiac artery as well as pancreatic ductal dilatation consistent with extension of neoplasm.  Soft tissue density within CBD stent with associated mild to moderate intrahepatic biliary ductal dilatation.  Evaluation for patency of portal venous structures is limited secondary to arterial phase of contrast administration. However, neither portal vein nor IVC are dilated.    IMPRESSION:    1. Stage IV pancreatic Ca     -On single agent gemzar (on hold due to thrombocytopenia)     -Now with worsening anasarca and ascites -r/o portal vein thrombosis     -CT shows evidence of progression    2. Acute thrombocytopenia     -likely 2/2 chemo (gemzar)      -Had prev low plt due to same reason      Plan:   -- CBC reviewed: Platelets trending up but Hb dropped, suspected abdominal wall hematoma post procedure: CT Abdo non cont ordered.   -- Will hold chemotherapy inpatient  -- Can send retic count, vit b12, folate level   -- s/p paracentesis on 12/1: F/u fluid cytology  -- CT A/P 12/1: Severely attenuated barely perceptible portal confluence, presumably secondary to adjacent soft tissue attenuation. 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 A small subcentimeter focus of thrombus is noted in the main portal vein superior to the splenic confluence with patent left and right main portal veins. Patent SMV and proximal jejunal branches.  -- Palliative care evaluation for symptom management   -- Will decide whether to continue Gemzar or switch treatment to be decided as outpatient with Dr Hayward.

## 2021-12-03 NOTE — DISCHARGE NOTE PROVIDER - PROVIDER TOKENS
PROVIDER:[TOKEN:[[id number]],FOLLOWUP:[1 week]],PROVIDER:[TOKEN:[[id number]],FOLLOWUP:[1-3 days]] PROVIDER:[TOKEN:[[id number]],FOLLOWUP:[1 week]],PROVIDER:[TOKEN:[[id number]],FOLLOWUP:[1-3 days]],PROVIDER:[TOKEN:[[id number]],FOLLOWUP:[1 week]]

## 2021-12-03 NOTE — DISCHARGE NOTE PROVIDER - CARE PROVIDERS DIRECT ADDRESSES
,[email or web address]

## 2021-12-03 NOTE — DISCHARGE NOTE PROVIDER - NSDCFUSCHEDAPPT_GEN_ALL_CORE_FT
BUSHRA CAST ; 12/06/2021 ; [facility] BUSHRA Pathak ; 12/06/2021 ; [facility] BUSHRA Rodriguez ; 12/13/2021 ; [facility] BUSHRA Rodriguez ; 12/20/2021 ; [facility] Norberto ESTRADA

## 2021-12-03 NOTE — CHART NOTE - NSCHARTNOTEFT_GEN_A_CORE
Pt being followed for pancreatic ca with thrombocytopenia.                          8.3    7.99  )-----------( 89       ( 03 Dec 2021 04:30 )             25.0       CT A/P:    < from: CT Abdomen and Pelvis w/ Oral Cont and w/ IV Cont (12.01.21 @ 17:41) >  IMPRESSION:  Severely attenuated barely perceptible portal confluence, presumably secondary to adjacent soft tissue attenuation. 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 A small subcentimeter focus of thrombus is noted in the main portal vein superior to the splenic confluence with patent left and right main portal veins. Patent SMV and proximal jejunal branches. Left upper quadrant collateral vessels are noted.    PLAN:  -Start Eliquis 5mg BID as plt count is >60k   -Please try to have pt scheduled for frequent paracentesis with IR  -Pt has follow up appt with Dr Hayward on 12/6/21. Will schedule for blood work for same day as well.     Stable for d/c from hem/onc standpoint

## 2021-12-03 NOTE — DISCHARGE NOTE PROVIDER - NSDCCPCAREPLAN_GEN_ALL_CORE_FT
PRINCIPAL DISCHARGE DIAGNOSIS  Diagnosis: Thrombocytopenia  Assessment and Plan of Treatment: Your platelet counts were dropped suddnely likely from chemotherapy you received. We transfused you with 1 u of platelets. Your platelet counts are now increasing likely from your own body production. Please follow up with heme/onc outpatient as per routine      SECONDARY DISCHARGE DIAGNOSES  Diagnosis: Ascites  Assessment and Plan of Treatment: You were sent in from  [facility] due to abdominal distention. You were found to have large volume ascites (fluid in peritoneal cavity). You had diagnostic and therapeutic paracentesisi done. Some of the lab values are still pending. Those you can follow up for outpatient. You were started on lasix 40mg orally and aldactone 100mg orally daily. This will help decrease ascites. Please follow up with your PCP  office on day 3 and day 7 after your discharge to monitor your BMP and have adjust this medicines as necessory.   You had repeat CT of abdomen which shows recurrance of ascites. You will have to follow up with Interventional radiology for therapeutic paracentesis.    Diagnosis: Portal vein obstruction  Assessment and Plan of Treatment: You had an ascites likely from portal vein obstruction from thrombus. You also have a part of spleenic vein thrombosed. Follow up with heme/onc regarding further plan for anticoagulation.    Diagnosis: Pancreatic adenocarcinoma  Assessment and Plan of Treatment: Management as per your heme/onc outpatient.     PRINCIPAL DISCHARGE DIAGNOSIS  Diagnosis: Thrombocytopenia  Assessment and Plan of Treatment: Your platelet counts were dropped suddnely likely from chemotherapy you received. We transfused you with 1 u of platelets. Your platelet counts are now increasing likely from your own body production. Please follow up with heme/onc outpatient as per routine      SECONDARY DISCHARGE DIAGNOSES  Diagnosis: Ascites  Assessment and Plan of Treatment: You were sent in from  [facility] due to abdominal distention. You were found to have large volume ascites (fluid in peritoneal cavity). You had diagnostic and therapeutic paracentesisi done. Some of the lab values are still pending. Those you can follow up for outpatient. You were started on lasix 40mg orally and aldactone 100mg orally daily. This will help decrease ascites. Please follow up with your PCP  office on day 3 and day 7 after your discharge to monitor your BMP and have adjust this medicines as necessory.   You had repeat CT of abdomen which shows recurrance of ascites. You will have to follow up with Interventional radiology for therapeutic paracentesis.    Diagnosis: Portal vein obstruction  Assessment and Plan of Treatment: You had an ascites likely from portal vein obstruction from thrombus. You also have a part of spleenic vein thrombosed. You are started on anticoagulation for prevention further propagation of clot. if you notice any abnormal bleeding or bruising then seek medical attention immediately.    Diagnosis: Pancreatic adenocarcinoma  Assessment and Plan of Treatment: Management as per your heme/onc outpatient.

## 2021-12-03 NOTE — DISCHARGE NOTE PROVIDER - NSDCMRMEDTOKEN_GEN_ALL_CORE_FT
Lasix 40 mg oral tablet: 1 tab(s) orally once a day  pancrelipase 36,000 units-114,000 units-180,000 units oral delayed release capsule: 1 cap(s) orally 3 times a day (with meals)  spironolactone 100 mg oral tablet: 1 tab(s) orally once a day    Eliquis 5 mg oral tablet: 1 tab(s) orally 2 times a day   Lasix 40 mg oral tablet: 1 tab(s) orally once a day  pancrelipase 36,000 units-114,000 units-180,000 units oral delayed release capsule: 1 cap(s) orally 3 times a day (with meals)  spironolactone 100 mg oral tablet: 1 tab(s) orally once a day

## 2021-12-03 NOTE — DISCHARGE NOTE PROVIDER - CARE PROVIDER_API CALL
Sj Hayward)  Internal Medicine; Medical Oncology  [address] [place] [address]  Phone: [phone]  Fax: [phone]  Follow Up Time: 1 week    INOCNECIO AGUILAR  Internal Medicine  [address] [place] [address]  Phone: [phone]  Fax: [phone]  Follow Up Time: 1-3 days   Sj Hayward)  Internal Medicine; Medical Oncology  [address] [place] [address]  Phone: [phone]  Fax: [phone]  Follow Up Time: 1 week    INOCENCIO AGUILAR  Internal Medicine  [address] [place] [address]  Phone: ([phone]  Fax: [phone]  Follow Up Time: 1-3 days    JANES LICEA  Diagnostic Radiology  Phone: ()-  Fax: ()-  Follow Up Time: 1 week

## 2021-12-03 NOTE — DISCHARGE NOTE NURSING/CASE MANAGEMENT/SOCIAL WORK - NSDCPEFALRISK_GEN_ALL_CORE
For information on Fall & Injury Prevention, visit: [email or web address] OR  [email or web address] OR  https://www.cdc.gov/steadi/patient.html

## 2021-12-03 NOTE — DISCHARGE NOTE NURSING/CASE MANAGEMENT/SOCIAL WORK - PATIENT PORTAL LINK FT
You can access the FollowMyHealth Patient Portal offered by [facility] by registering at the following website: [email or web address]. By joining [employer]’s FollowMyHealth portal, you will also be able to view your health information using other applications (apps) compatible with our system.

## 2021-12-03 NOTE — DISCHARGE NOTE PROVIDER - HOSPITAL COURSE
61F w/ PMHx of metastatic pancreatic cancer on chemotherapy (Dx 7/2021, on Gemcitabine following Dr. Hayward) complicated by splenomegaly and varices (splenic/portal vein thrombosis), HTN, DM presented to the ED from oncologist office to rule out tumor obstruction vs thrombosis given new anascarca and thrombocytopenia.    Pt noted to have large ascites s/p paracentesis due to portal vein obstruction from a thrombus. pt's platelet is now stabilizing and hgb is stable. Pt started on PO lasix and po aldactone. will need o/p BMP monitoring. Repeat CTAP showed no abd wall hematoma however recurrance of ascites. Pt will require therapeutic paracentesis from IR outpatient. Currently patient is stable for discharge w/ instructions to f/u w/ PCP, heme/onc, and IR. 61F w/ PMHx of metastatic pancreatic cancer on chemotherapy (Dx 7/2021, on Gemcitabine following Dr. Hayward) complicated by splenomegaly and varices (splenic/portal vein thrombosis), HTN, DM presented to the ED from oncologist office to rule out tumor obstruction vs thrombosis given new anascarca and thrombocytopenia.    Pt noted to have large ascites s/p paracentesis due to portal vein obstruction from a thrombus. pt's platelet is now stabilizing and hgb is stable. Pt started on PO lasix and po aldactone. will need o/p BMP monitoring. Repeat CTAP showed no abd wall hematoma however recurrance of ascites. Pt will require therapeutic paracentesis from IR outpatient. Currently patient is stable for discharge w/ instructions to f/u w/ PCP, heme/onc, and IR.       Attending addendum: Patient seen and examined. Patient is stable for discharge. Med rec reviewed. Needs BMP in 3 days due to Lasix/Aldactone initiation. Patient and  instructed to follow up for blood work.

## 2021-12-07 PROBLEM — D69.6 THROMBOCYTOPENIA: Status: ACTIVE | Noted: 2021-01-01

## 2021-12-07 NOTE — ASSESSMENT
[FreeTextEntry1] : 61 year old female with advanced pancreatic cancer with obstructive jaundice , portal vein encasement  and dilated CBD s/p metallic stent . splenomegaly and suspected varices. ECOG 2 to 3 .\par S/P FOlFIRINOX X 5 poorly tolerated due to severe diarrhea, hypokalemia, and marked weight loss. Started on Gemcitibine s/p Cycle 1 with delayed D8 due to thrombocytopenia : developed large volume ascites and anasarca sec to obstruction due to  Portal vein thrombosis \par Imaging consistent with progression and new thrombosis of Portal Vein \par On Eliquis \par Platelets recovered \par \par Plan: \par -- Discussed with pt , will resume chemo . Plan to add abraxane with gemcitabine . ( will check for drug availability due to nationwide shortage ) Will schedule pt for treatment this wk . Lfts noted from hospital stay , can safely add abraxane at this point . \par --cbc today showed normal platelets . \par --recommended to c/w eliquis. \par --F/u with IR for repeat paracentesis , abdomen appears soft today . \par --pt will be notified about the treatment plan . \par \par Patient seen and examined with Dr. Hayward who agrees with plan of care.\par \par \par

## 2021-12-07 NOTE — PHYSICAL EXAM
[Ambulatory and capable of all self care but unable to carry out any work activities] : Status 2- Ambulatory and capable of all self care but unable to carry out any work activities. Up and about more than 50% of waking hours [Cachectic] : cachectic [Normal] : no peripheral adenopathy appreciated [de-identified] : Cachectic , weak , frail , with anasarca  [de-identified] : + 3 B/L LE pitting edema  [de-identified] : soft distended

## 2021-12-07 NOTE — HISTORY OF PRESENT ILLNESS
[de-identified] : 61 year old white female self referred with advanced pancreatic cancer presents for second opinion and to transfer her oncologic care . PMH is significant for hypertension , diagnosed with metastatic pancreatic cancer in May 2021 when she presented with diarrhea , indigestion and  progressive weight loss. PET scan showed mass involving neck and body of pancreas with regional lymph node encasing SMA in addition to left para aortic node in mid abdomen . splenomegaly and suspected early varices suggestive of splenic/portal vein thrombosis . biliary duct dilatation due to peripancreatic and and orlando hepatis adenopathy . \par EUS with biopsy confirmed pancreatic adenocarcinoma , she was found with involvement of portal vein and CBD . ERCP was done with placement of metallic stent . \par She has received this far 5 cycles of chemotherapy ( likely FOLFIRINOX ) complicated with severe diarrhea , electrolyte imbalance , partially controlled with lomotil , stopped due to dizziness and loss of appetite . She lost nearly 40 lbs and complains of generalized weakness, no fever, abdominal or back pain , no itching . She takes pancreatic enzymes one with meals , ensure one daily in addition to mineral supplements . She ambulates without assistance and is  capable of limited self care .\par Meds : kcl , pancreatic enzymes . ( HCTZ was discontinued )  [de-identified] : 11/29/2021: BUHSRA is here for follow up visit for advanced pancreatic cancer S/P FOlFIRINOX X 5 poorly tolerated due to severe diarrhea, hypokalemia, and marked weight loss. Recieved 1 cycle of Gemcitabine with D8 delayed due to thrombocytopenia. She is here today for cycle 2 of treatment. Patient noted to have + 3 pitting edema of the B/L LE and abdominal distention with areas of pitting suggestive of obstruction (tumor burden vs thrombosis) CT of Abd from 10/12/21 showed encasement of the celiac artery, common hepatic, proper hepatic, and right hepatic splenic arteries; vessels remained patent at that time. Main portal vein viewed narrow. Patient denies SOB, cough, leg pain, unusual bleeding or bruising, or abdominal pain at this time. CBC reviewed thrombocytopenia noted. \par \par 12/06/21\par Pt returns for foloow up today , she was discharged from hospital on 12/03 . On her last visit on 11/29 , she was noted to have diffuse anasarca and abdominal distension . She was referred to ER . Imaging showed large volume ascites .\par IMPRESSION:\par Large volume ascites as well as anasarca.\par Moderate right and small left pleural effusions.\par Right hepatic hyperdensities are noted, largest measuring 1.4 cm. Correlation with prior abdominal CT scans is recommended.\par Rind of soft tissue surrounding proximal celiac artery as well as pancreatic ductal dilatation consistent with extension of neoplasm.\par Soft tissue density within CBD stent with associated mild to moderate intrahepatic biliary ductal dilatation.\par Evaluation for patency of portal venous structures is limited secondary to arterial phase of contrast administration. However, neither portal vein nor IVC are dilated\par Severely attenuated barely perceptible portal confluence, presumably secondary to adjacent soft tissue attenuation. 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 A small subcentimeter focus of thrombus is noted in the main portal vein superior to the splenic confluence with patent left and right main portal veins. Patent SMV and proximal jejunal branches. Left upper quadrant collateral vessels are noted.\par \par Pt had therapeutic paracentesis by IR and about 7L of ascitic fluid was removed . Pt was given aggressive IV diuresis . She was also started on Eliquis for portal vein thrombosis . While in the hospital her platelet counts recovered . She has been taking diuretics at home , reports has good appetite and is well compliant with medication . She will be scheduled by IR for repeat paracentesis .

## 2021-12-14 NOTE — ED ADULT NURSE NOTE - SUICIDE SCREENING QUESTION 1
Patient was scheduled for a physical with Dr Kandace Alanis on 12-20 but we need to r/s-does he need to have a physical with his PCP? There are no openings in December with Dr Jef Randolph. Please advise where to schedule. No

## 2021-12-30 NOTE — HISTORY OF PRESENT ILLNESS
[de-identified] : 61 year old white female self referred with advanced pancreatic cancer presents for second opinion and to transfer her oncologic care . PMH is significant for hypertension , diagnosed with metastatic pancreatic cancer in May 2021 when she presented with diarrhea , indigestion and  progressive weight loss. PET scan showed mass involving neck and body of pancreas with regional lymph node encasing SMA in addition to left para aortic node in mid abdomen . splenomegaly and suspected early varices suggestive of splenic/portal vein thrombosis . biliary duct dilatation due to peripancreatic and and orlando hepatis adenopathy . \par EUS with biopsy confirmed pancreatic adenocarcinoma , she was found with involvement of portal vein and CBD . ERCP was done with placement of metallic stent . \par She has received this far 5 cycles of chemotherapy ( likely FOLFIRINOX ) complicated with severe diarrhea , electrolyte imbalance , partially controlled with lomotil , stopped due to dizziness and loss of appetite . She lost nearly 40 lbs and complains of generalized weakness, no fever, abdominal or back pain , no itching . She takes pancreatic enzymes one with meals , ensure one daily in addition to mineral supplements . She ambulates without assistance and is  capable of limited self care .\par Meds : kcl , pancreatic enzymes . ( HCTZ was discontinued )  [de-identified] : 11/29/2021: BUSHRA is here for follow up visit for advanced pancreatic cancer S/P FOlFIRINOX X 5 poorly tolerated due to severe diarrhea, hypokalemia, and marked weight loss. Recieved 1 cycle of Gemcitabine with D8 delayed due to thrombocytopenia. She is here today for cycle 2 of treatment. Patient noted to have + 3 pitting edema of the B/L LE and abdominal distention with areas of pitting suggestive of obstruction (tumor burden vs thrombosis) CT of Abd from 10/12/21 showed encasement of the celiac artery, common hepatic, proper hepatic, and right hepatic splenic arteries; vessels remained patent at that time. Main portal vein viewed narrow. Patient denies SOB, cough, leg pain, unusual bleeding or bruising, or abdominal pain at this time. CBC reviewed thrombocytopenia noted. \par \par 12/06/21\par Pt returns for foloow up today , she was discharged from hospital on 12/03 . On her last visit on 11/29 , she was noted to have diffuse anasarca and abdominal distension . She was referred to ER . Imaging showed large volume ascites .\par IMPRESSION:\par Large volume ascites as well as anasarca.\par Moderate right and small left pleural effusions.\par Right hepatic hyperdensities are noted, largest measuring 1.4 cm. Correlation with prior abdominal CT scans is recommended.\par Rind of soft tissue surrounding proximal celiac artery as well as pancreatic ductal dilatation consistent with extension of neoplasm.\par Soft tissue density within CBD stent with associated mild to moderate intrahepatic biliary ductal dilatation.\par Evaluation for patency of portal venous structures is limited secondary to arterial phase of contrast administration. However, neither portal vein nor IVC are dilated\par Severely attenuated barely perceptible portal confluence, presumably secondary to adjacent soft tissue attenuation. 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 A small subcentimeter focus of thrombus is noted in the main portal vein superior to the splenic confluence with patent left and right main portal veins. Patent SMV and proximal jejunal branches. Left upper quadrant collateral vessels are noted.\par \par Pt had therapeutic paracentesis by IR and about 7L of ascitic fluid was removed . Pt was given aggressive IV diuresis . She was also started on Eliquis for portal vein thrombosis . While in the hospital her platelet counts recovered . She has been taking diuretics at home , reports has good appetite and is well compliant with medication . She will be scheduled by IR for repeat paracentesis . \par \par 12/27/21\par Pt is here today for follow up visit for advanced pancreatic cancer and chemotherapy - Gemzar & Abraxane.\par Pt states she tolerated first cycle fairly well.  She lost 38 lbs from partial resolution of her ascites, s/p paracentesis x 1.\par Denies any pain, fever, N/V/C/D.  Appetite fair.\par Previous labs and scans reviewed.

## 2021-12-30 NOTE — PHYSICAL EXAM
[Ambulatory and capable of all self care but unable to carry out any work activities] : Status 2- Ambulatory and capable of all self care but unable to carry out any work activities. Up and about more than 50% of waking hours [Cachectic] : cachectic [Normal] : no peripheral adenopathy appreciated [de-identified] : Cachectic , weak , frail , with anasarca  [de-identified] : + 3 B/L LE pitting edema  [de-identified] : soft distended

## 2021-12-30 NOTE — ASSESSMENT
[FreeTextEntry1] : 61 year old female with advanced pancreatic cancer with obstructive jaundice , portal vein encasement  and dilated CBD s/p metallic stent . splenomegaly and suspected varices. ECOG 2 to 3 .\par S/P FOlFIRINOX X 5 poorly tolerated due to severe diarrhea, hypokalemia, and marked weight loss. Started on Gemcitibine s/p Cycle 1 with delayed D8 due to thrombocytopenia : developed large volume ascites and anasarca sec to obstruction due to  Portal vein thrombosis \par Imaging consistent with progression and new thrombosis of Portal Vein \par On Eliquis \par Platelets recovered \par \par Plan: \par --Proceed with cycle #2 of Gemcitabine and Abraxane today. CBC stable: wbc=4.3, Hgb=10.7, cgc=746T, ANC=2.03\par    Dosage was recalculated since she lost 38 lbs due to partial resolution of ascites, s/p paracentesis x 1.\par --Decreased dose of Lasix from 40 mg PO  to 20 mg PO daily. E-refilled.\par --Decreased dose of Spirinolactone from 100 mg PO to 50 mg PO daily.  E-refilled.\par --Recommended to c/w Eliquis. \par \par Case was seen and discussed with Dr. Hayward who agreed with the assessment and plan.\par \par \par \par

## 2022-01-01 ENCOUNTER — APPOINTMENT (OUTPATIENT)
Dept: HEMATOLOGY ONCOLOGY | Facility: CLINIC | Age: 62
End: 2022-01-01
Payer: COMMERCIAL

## 2022-01-01 ENCOUNTER — APPOINTMENT (OUTPATIENT)
Dept: INFUSION THERAPY | Facility: CLINIC | Age: 62
End: 2022-01-01

## 2022-01-01 ENCOUNTER — OUTPATIENT (OUTPATIENT)
Dept: OUTPATIENT SERVICES | Facility: HOSPITAL | Age: 62
LOS: 1 days | Discharge: HOME | End: 2022-01-01

## 2022-01-01 ENCOUNTER — INPATIENT (INPATIENT)
Facility: HOSPITAL | Age: 62
LOS: 2 days | End: 2022-02-20
Attending: HOSPITALIST | Admitting: HOSPITALIST
Payer: COMMERCIAL

## 2022-01-01 ENCOUNTER — LABORATORY RESULT (OUTPATIENT)
Age: 62
End: 2022-01-01

## 2022-01-01 ENCOUNTER — APPOINTMENT (OUTPATIENT)
Dept: INFUSION THERAPY | Facility: CLINIC | Age: 62
End: 2022-01-01
Payer: COMMERCIAL

## 2022-01-01 ENCOUNTER — NON-APPOINTMENT (OUTPATIENT)
Age: 62
End: 2022-01-01

## 2022-01-01 ENCOUNTER — RX RENEWAL (OUTPATIENT)
Age: 62
End: 2022-01-01

## 2022-01-01 VITALS
DIASTOLIC BLOOD PRESSURE: 60 MMHG | SYSTOLIC BLOOD PRESSURE: 95 MMHG | OXYGEN SATURATION: 96 % | HEIGHT: 65 IN | HEART RATE: 99 BPM | RESPIRATION RATE: 18 BRPM | WEIGHT: 119.93 LBS

## 2022-01-01 VITALS
TEMPERATURE: 98 F | HEART RATE: 121 BPM | SYSTOLIC BLOOD PRESSURE: 97 MMHG | RESPIRATION RATE: 10 BRPM | OXYGEN SATURATION: 94 % | DIASTOLIC BLOOD PRESSURE: 64 MMHG

## 2022-01-01 VITALS
WEIGHT: 113 LBS | HEIGHT: 66 IN | HEART RATE: 109 BPM | DIASTOLIC BLOOD PRESSURE: 69 MMHG | BODY MASS INDEX: 18.16 KG/M2 | HEART RATE: 118 BPM | BODY MASS INDEX: 18.16 KG/M2 | OXYGEN SATURATION: 98 % | TEMPERATURE: 97.6 F | WEIGHT: 113 LBS | SYSTOLIC BLOOD PRESSURE: 107 MMHG | TEMPERATURE: 97.4 F | RESPIRATION RATE: 18 BRPM | DIASTOLIC BLOOD PRESSURE: 64 MMHG | SYSTOLIC BLOOD PRESSURE: 110 MMHG | RESPIRATION RATE: 18 BRPM | OXYGEN SATURATION: 98 % | HEIGHT: 66 IN

## 2022-01-01 DIAGNOSIS — J90 PLEURAL EFFUSION, NOT ELSEWHERE CLASSIFIED: ICD-10-CM

## 2022-01-01 DIAGNOSIS — C25.9 MALIGNANT NEOPLASM OF PANCREAS, UNSPECIFIED: ICD-10-CM

## 2022-01-01 DIAGNOSIS — J18.9 PNEUMONIA, UNSPECIFIED ORGANISM: ICD-10-CM

## 2022-01-01 DIAGNOSIS — E43 UNSPECIFIED SEVERE PROTEIN-CALORIE MALNUTRITION: ICD-10-CM

## 2022-01-01 DIAGNOSIS — R62.7 ADULT FAILURE TO THRIVE: ICD-10-CM

## 2022-01-01 DIAGNOSIS — T80.212A LOCAL INFECTION DUE TO CENTRAL VENOUS CATHETER, INITIAL ENCOUNTER: ICD-10-CM

## 2022-01-01 DIAGNOSIS — Z79.01 LONG TERM (CURRENT) USE OF ANTICOAGULANTS: ICD-10-CM

## 2022-01-01 DIAGNOSIS — R18.8 OTHER ASCITES: ICD-10-CM

## 2022-01-01 DIAGNOSIS — E87.1 HYPO-OSMOLALITY AND HYPONATREMIA: ICD-10-CM

## 2022-01-01 DIAGNOSIS — N17.9 ACUTE KIDNEY FAILURE, UNSPECIFIED: ICD-10-CM

## 2022-01-01 DIAGNOSIS — Z86.718 PERSONAL HISTORY OF OTHER VENOUS THROMBOSIS AND EMBOLISM: ICD-10-CM

## 2022-01-01 DIAGNOSIS — R17 UNSPECIFIED JAUNDICE: ICD-10-CM

## 2022-01-01 DIAGNOSIS — G89.3 NEOPLASM RELATED PAIN (ACUTE) (CHRONIC): ICD-10-CM

## 2022-01-01 DIAGNOSIS — Z51.5 ENCOUNTER FOR PALLIATIVE CARE: ICD-10-CM

## 2022-01-01 DIAGNOSIS — Z66 DO NOT RESUSCITATE: ICD-10-CM

## 2022-01-01 DIAGNOSIS — R64 CACHEXIA: ICD-10-CM

## 2022-01-01 DIAGNOSIS — J96.01 ACUTE RESPIRATORY FAILURE WITH HYPOXIA: ICD-10-CM

## 2022-01-01 DIAGNOSIS — D49.0 NEOPLASM OF UNSPECIFIED BEHAVIOR OF DIGESTIVE SYSTEM: ICD-10-CM

## 2022-01-01 DIAGNOSIS — I26.99 OTHER PULMONARY EMBOLISM WITHOUT ACUTE COR PULMONALE: ICD-10-CM
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
## 2022-01-01 DIAGNOSIS — R18.0 MALIGNANT ASCITES: ICD-10-CM

## 2022-01-01 DIAGNOSIS — Z51.11 ENCOUNTER FOR ANTINEOPLASTIC CHEMOTHERAPY: ICD-10-CM

## 2022-01-01 DIAGNOSIS — A41.50 GRAM-NEGATIVE SEPSIS, UNSPECIFIED: ICD-10-CM

## 2022-01-01 DIAGNOSIS — E16.2 HYPOGLYCEMIA, UNSPECIFIED: ICD-10-CM

## 2022-01-01 DIAGNOSIS — I74.8 EMBOLISM AND THROMBOSIS OF OTHER ARTERIES: ICD-10-CM

## 2022-01-01 DIAGNOSIS — E86.1 HYPOVOLEMIA: ICD-10-CM

## 2022-01-01 DIAGNOSIS — I10 ESSENTIAL (PRIMARY) HYPERTENSION: ICD-10-CM

## 2022-01-01 DIAGNOSIS — D63.0 ANEMIA IN NEOPLASTIC DISEASE: ICD-10-CM

## 2022-01-01 DIAGNOSIS — I81 PORTAL VEIN THROMBOSIS: ICD-10-CM
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
## 2022-01-01 DIAGNOSIS — E88.09 OTHER DISORDERS OF PLASMA-PROTEIN METABOLISM, NOT ELSEWHERE CLASSIFIED: ICD-10-CM

## 2022-01-01 DIAGNOSIS — R65.21 SEVERE SEPSIS WITH SEPTIC SHOCK: ICD-10-CM

## 2022-01-01 DIAGNOSIS — E83.42 HYPOMAGNESEMIA: ICD-10-CM

## 2022-01-01 LAB
-  STREPTOCOCCUS SP. (NOT GRP A, B OR S PNEUMONIAE): SIGNIFICANT CHANGE UP
ALBUMIN SERPL ELPH-MCNC: 1.9 G/DL — LOW (ref 3.5–5.2)
ALBUMIN SERPL ELPH-MCNC: 1.9 G/DL — LOW (ref 3.5–5.2)
ALBUMIN SERPL ELPH-MCNC: 2.2 G/DL — LOW (ref 3.5–5.2)
ALBUMIN SERPL ELPH-MCNC: 2.4 G/DL
ALBUMIN SERPL ELPH-MCNC: 2.5 G/DL
ALBUMIN SERPL ELPH-MCNC: 2.7 G/DL
ALP BLD-CCNC: 113 U/L
ALP BLD-CCNC: 114 U/L
ALP BLD-CCNC: 122 U/L
ALP SERPL-CCNC: 103 U/L — SIGNIFICANT CHANGE UP (ref 30–115)
ALP SERPL-CCNC: 85 U/L — SIGNIFICANT CHANGE UP (ref 30–115)
ALP SERPL-CCNC: 86 U/L — SIGNIFICANT CHANGE UP (ref 30–115)
ALT FLD-CCNC: 11 U/L — SIGNIFICANT CHANGE UP (ref 0–41)
ALT FLD-CCNC: 14 U/L — SIGNIFICANT CHANGE UP (ref 0–41)
ALT FLD-CCNC: 9 U/L — SIGNIFICANT CHANGE UP (ref 0–41)
ALT SERPL-CCNC: 10 U/L
ALT SERPL-CCNC: 7 U/L
ALT SERPL-CCNC: 7 U/L
AMMONIA BLD-MCNC: 35 UMOL/L — SIGNIFICANT CHANGE UP (ref 11–55)
ANION GAP SERPL CALC-SCNC: 12 MMOL/L
ANION GAP SERPL CALC-SCNC: 13 MMOL/L — SIGNIFICANT CHANGE UP (ref 7–14)
ANION GAP SERPL CALC-SCNC: 15 MMOL/L
ANION GAP SERPL CALC-SCNC: 15 MMOL/L — HIGH (ref 7–14)
ANION GAP SERPL CALC-SCNC: 15 MMOL/L — HIGH (ref 7–14)
ANION GAP SERPL CALC-SCNC: 17 MMOL/L
ANION GAP SERPL CALC-SCNC: 21 MMOL/L — HIGH (ref 7–14)
APTT BLD: 33.2 SEC — SIGNIFICANT CHANGE UP (ref 27–39.2)
APTT BLD: 39.1 SEC — SIGNIFICANT CHANGE UP (ref 27–39.2)
AST SERPL-CCNC: 14 U/L
AST SERPL-CCNC: 15 U/L
AST SERPL-CCNC: 16 U/L — SIGNIFICANT CHANGE UP (ref 0–41)
AST SERPL-CCNC: 19 U/L
AST SERPL-CCNC: 28 U/L — SIGNIFICANT CHANGE UP (ref 0–41)
AST SERPL-CCNC: 29 U/L — SIGNIFICANT CHANGE UP (ref 0–41)
BASOPHILS # BLD AUTO: 0.01 K/UL — SIGNIFICANT CHANGE UP (ref 0–0.2)
BASOPHILS # BLD AUTO: 0.02 K/UL — SIGNIFICANT CHANGE UP (ref 0–0.2)
BASOPHILS # BLD AUTO: 0.02 K/UL — SIGNIFICANT CHANGE UP (ref 0–0.2)
BASOPHILS NFR BLD AUTO: 0.1 % — SIGNIFICANT CHANGE UP (ref 0–1)
BASOPHILS NFR BLD AUTO: 0.2 % — SIGNIFICANT CHANGE UP (ref 0–1)
BASOPHILS NFR BLD AUTO: 0.3 % — SIGNIFICANT CHANGE UP (ref 0–1)
BILIRUB SERPL-MCNC: 0.6 MG/DL — SIGNIFICANT CHANGE UP (ref 0.2–1.2)
BILIRUB SERPL-MCNC: 0.7 MG/DL
BILIRUB SERPL-MCNC: 0.7 MG/DL — SIGNIFICANT CHANGE UP (ref 0.2–1.2)
BILIRUB SERPL-MCNC: 0.8 MG/DL
BILIRUB SERPL-MCNC: 0.9 MG/DL — SIGNIFICANT CHANGE UP (ref 0.2–1.2)
BILIRUB SERPL-MCNC: 1.1 MG/DL
BLD GP AB SCN SERPL QL: SIGNIFICANT CHANGE UP
BUN SERPL-MCNC: 11 MG/DL
BUN SERPL-MCNC: 12 MG/DL
BUN SERPL-MCNC: 13 MG/DL
BUN SERPL-MCNC: 23 MG/DL — HIGH (ref 10–20)
BUN SERPL-MCNC: 27 MG/DL — HIGH (ref 10–20)
BUN SERPL-MCNC: 28 MG/DL — HIGH (ref 10–20)
BUN SERPL-MCNC: 31 MG/DL — HIGH (ref 10–20)
CALCIUM SERPL-MCNC: 7.6 MG/DL
CALCIUM SERPL-MCNC: 8.1 MG/DL
CALCIUM SERPL-MCNC: 8.1 MG/DL — LOW (ref 8.5–10.1)
CALCIUM SERPL-MCNC: 8.1 MG/DL — LOW (ref 8.5–10.1)
CALCIUM SERPL-MCNC: 8.2 MG/DL — LOW (ref 8.5–10.1)
CALCIUM SERPL-MCNC: 8.4 MG/DL
CALCIUM SERPL-MCNC: 8.4 MG/DL — LOW (ref 8.5–10.1)
CANCER AG19-9 SERPL-ACNC: 105 U/ML
CANCER AG19-9 SERPL-ACNC: 123 U/ML
CEA SERPL-MCNC: 4 NG/ML
CEA SERPL-MCNC: 5.3 NG/ML
CHLORIDE SERPL-SCNC: 100 MMOL/L — SIGNIFICANT CHANGE UP (ref 98–110)
CHLORIDE SERPL-SCNC: 93 MMOL/L
CHLORIDE SERPL-SCNC: 96 MMOL/L
CHLORIDE SERPL-SCNC: 96 MMOL/L — LOW (ref 98–110)
CHLORIDE SERPL-SCNC: 98 MMOL/L — SIGNIFICANT CHANGE UP (ref 98–110)
CHLORIDE SERPL-SCNC: 98 MMOL/L — SIGNIFICANT CHANGE UP (ref 98–110)
CHLORIDE SERPL-SCNC: 99 MMOL/L
CO2 SERPL-SCNC: 15 MMOL/L — LOW (ref 17–32)
CO2 SERPL-SCNC: 18 MMOL/L
CO2 SERPL-SCNC: 19 MMOL/L
CO2 SERPL-SCNC: 20 MMOL/L — SIGNIFICANT CHANGE UP (ref 17–32)
CO2 SERPL-SCNC: 21 MMOL/L — SIGNIFICANT CHANGE UP (ref 17–32)
CO2 SERPL-SCNC: 22 MMOL/L — SIGNIFICANT CHANGE UP (ref 17–32)
CO2 SERPL-SCNC: 23 MMOL/L
CREAT SERPL-MCNC: 0.5 MG/DL
CREAT SERPL-MCNC: 0.5 MG/DL
CREAT SERPL-MCNC: 0.6 MG/DL
CREAT SERPL-MCNC: 0.9 MG/DL — SIGNIFICANT CHANGE UP (ref 0.7–1.5)
CREAT SERPL-MCNC: 1.6 MG/DL — HIGH (ref 0.7–1.5)
CREAT SERPL-MCNC: 1.7 MG/DL — HIGH (ref 0.7–1.5)
CREAT SERPL-MCNC: 2.1 MG/DL — HIGH (ref 0.7–1.5)
CULTURE RESULTS: SIGNIFICANT CHANGE UP
CULTURE RESULTS: SIGNIFICANT CHANGE UP
D DIMER BLD IA.RAPID-MCNC: 4663 NG/ML DDU — HIGH (ref 0–230)
D DIMER BLD IA.RAPID-MCNC: 6163 NG/ML DDU — HIGH (ref 0–230)
EOSINOPHIL # BLD AUTO: 0 K/UL — SIGNIFICANT CHANGE UP (ref 0–0.7)
EOSINOPHIL NFR BLD AUTO: 0 % — SIGNIFICANT CHANGE UP (ref 0–8)
GLUCOSE BLDC GLUCOMTR-MCNC: 140 MG/DL — HIGH (ref 70–99)
GLUCOSE BLDC GLUCOMTR-MCNC: 144 MG/DL — HIGH (ref 70–99)
GLUCOSE BLDC GLUCOMTR-MCNC: 275 MG/DL — HIGH (ref 70–99)
GLUCOSE BLDC GLUCOMTR-MCNC: 296 MG/DL — HIGH (ref 70–99)
GLUCOSE BLDC GLUCOMTR-MCNC: 38 MG/DL — CRITICAL LOW (ref 70–99)
GLUCOSE SERPL-MCNC: 10 MG/DL — CRITICAL LOW (ref 70–99)
GLUCOSE SERPL-MCNC: 111 MG/DL
GLUCOSE SERPL-MCNC: 146 MG/DL — HIGH (ref 70–99)
GLUCOSE SERPL-MCNC: 222 MG/DL — HIGH (ref 70–99)
GLUCOSE SERPL-MCNC: 66 MG/DL — LOW (ref 70–99)
GLUCOSE SERPL-MCNC: 87 MG/DL
GLUCOSE SERPL-MCNC: 90 MG/DL
GRAM STN FLD: SIGNIFICANT CHANGE UP
GRAM STN FLD: SIGNIFICANT CHANGE UP
HCT VFR BLD CALC: 30.4 % — LOW (ref 37–47)
HCT VFR BLD CALC: 30.9 % — LOW (ref 37–47)
HCT VFR BLD CALC: 31.3 %
HCT VFR BLD CALC: 32.1 %
HCT VFR BLD CALC: 32.7 %
HCT VFR BLD CALC: 32.7 % — LOW (ref 37–47)
HGB BLD-MCNC: 10 G/DL
HGB BLD-MCNC: 10 G/DL
HGB BLD-MCNC: 10.3 G/DL
HGB BLD-MCNC: 10.4 G/DL — LOW (ref 12–16)
HGB BLD-MCNC: 9.5 G/DL — LOW (ref 12–16)
HGB BLD-MCNC: 9.5 G/DL — LOW (ref 12–16)
IMM GRANULOCYTES NFR BLD AUTO: 0.7 % — HIGH (ref 0.1–0.3)
IMM GRANULOCYTES NFR BLD AUTO: 1.2 % — HIGH (ref 0.1–0.3)
IMM GRANULOCYTES NFR BLD AUTO: 2.4 % — HIGH (ref 0.1–0.3)
INR BLD: 2.67 RATIO — HIGH (ref 0.65–1.3)
INR BLD: 3.34 RATIO — HIGH (ref 0.65–1.3)
LACTATE SERPL-SCNC: 6.5 MMOL/L — CRITICAL HIGH (ref 0.7–2)
LYMPHOCYTES # BLD AUTO: 0.42 K/UL — LOW (ref 1.2–3.4)
LYMPHOCYTES # BLD AUTO: 0.72 K/UL — LOW (ref 1.2–3.4)
LYMPHOCYTES # BLD AUTO: 0.8 K/UL — LOW (ref 1.2–3.4)
LYMPHOCYTES # BLD AUTO: 14 % — LOW (ref 20.5–51.1)
LYMPHOCYTES # BLD AUTO: 3.9 % — LOW (ref 20.5–51.1)
LYMPHOCYTES # BLD AUTO: 6.2 % — LOW (ref 20.5–51.1)
MAGNESIUM SERPL-MCNC: 1.7 MG/DL — LOW (ref 1.8–2.4)
MAGNESIUM SERPL-MCNC: 2.2 MG/DL — SIGNIFICANT CHANGE UP (ref 1.8–2.4)
MAGNESIUM SERPL-MCNC: 2.3 MG/DL — SIGNIFICANT CHANGE UP (ref 1.8–2.4)
MCHC RBC-ENTMCNC: 28.2 PG — SIGNIFICANT CHANGE UP (ref 27–31)
MCHC RBC-ENTMCNC: 28.4 PG — SIGNIFICANT CHANGE UP (ref 27–31)
MCHC RBC-ENTMCNC: 28.5 PG — SIGNIFICANT CHANGE UP (ref 27–31)
MCHC RBC-ENTMCNC: 29.2 PG
MCHC RBC-ENTMCNC: 29.9 PG
MCHC RBC-ENTMCNC: 30.7 G/DL — LOW (ref 32–37)
MCHC RBC-ENTMCNC: 31.2 G/DL
MCHC RBC-ENTMCNC: 31.2 PG
MCHC RBC-ENTMCNC: 31.3 G/DL — LOW (ref 32–37)
MCHC RBC-ENTMCNC: 31.5 G/DL
MCHC RBC-ENTMCNC: 31.8 G/DL — LOW (ref 32–37)
MCHC RBC-ENTMCNC: 31.9 G/DL
MCV RBC AUTO: 88.6 FL — SIGNIFICANT CHANGE UP (ref 81–99)
MCV RBC AUTO: 90.7 FL — SIGNIFICANT CHANGE UP (ref 81–99)
MCV RBC AUTO: 92.8 FL — SIGNIFICANT CHANGE UP (ref 81–99)
MCV RBC AUTO: 93.6 FL
MCV RBC AUTO: 94.8 FL
MCV RBC AUTO: 97.5 FL
METHOD TYPE: SIGNIFICANT CHANGE UP
MONOCYTES # BLD AUTO: 0.42 K/UL — SIGNIFICANT CHANGE UP (ref 0.1–0.6)
MONOCYTES # BLD AUTO: 1.41 K/UL — HIGH (ref 0.1–0.6)
MONOCYTES # BLD AUTO: 1.71 K/UL — HIGH (ref 0.1–0.6)
MONOCYTES NFR BLD AUTO: 10.8 % — HIGH (ref 1.7–9.3)
MONOCYTES NFR BLD AUTO: 14 % — HIGH (ref 1.7–9.3)
MONOCYTES NFR BLD AUTO: 9.3 % — SIGNIFICANT CHANGE UP (ref 1.7–9.3)
NEUTROPHILS # BLD AUTO: 10.46 K/UL — HIGH (ref 1.4–6.5)
NEUTROPHILS # BLD AUTO: 15.74 K/UL — HIGH (ref 1.4–6.5)
NEUTROPHILS # BLD AUTO: 2.12 K/UL — SIGNIFICANT CHANGE UP (ref 1.4–6.5)
NEUTROPHILS NFR BLD AUTO: 71 % — SIGNIFICANT CHANGE UP (ref 42.2–75.2)
NEUTROPHILS NFR BLD AUTO: 80.4 % — HIGH (ref 42.2–75.2)
NEUTROPHILS NFR BLD AUTO: 85.5 % — HIGH (ref 42.2–75.2)
NRBC # BLD: 0 /100 WBCS — SIGNIFICANT CHANGE UP (ref 0–0)
NRBC # BLD: 1 /100 WBCS — HIGH (ref 0–0)
NRBC # BLD: 4 /100 WBCS — HIGH (ref 0–0)
NT-PROBNP SERPL-SCNC: 3023 PG/ML — HIGH (ref 0–300)
ORGANISM # SPEC MICROSCOPIC CNT: SIGNIFICANT CHANGE UP
ORGANISM # SPEC MICROSCOPIC CNT: SIGNIFICANT CHANGE UP
OSMOLALITY SERPL: 290 MOS/KG — SIGNIFICANT CHANGE UP (ref 275–300)
PLATELET # BLD AUTO: 163 K/UL — SIGNIFICANT CHANGE UP (ref 130–400)
PLATELET # BLD AUTO: 221 K/UL
PLATELET # BLD AUTO: 228 K/UL — SIGNIFICANT CHANGE UP (ref 130–400)
PLATELET # BLD AUTO: 230 K/UL — SIGNIFICANT CHANGE UP (ref 130–400)
PLATELET # BLD AUTO: 399 K/UL
PLATELET # BLD AUTO: 404 K/UL
PMV BLD: 10 FL
PMV BLD: 9.5 FL
PMV BLD: 9.7 FL
POTASSIUM SERPL-MCNC: 4.3 MMOL/L — SIGNIFICANT CHANGE UP (ref 3.5–5)
POTASSIUM SERPL-MCNC: 5.2 MMOL/L — HIGH (ref 3.5–5)
POTASSIUM SERPL-MCNC: 5.3 MMOL/L — HIGH (ref 3.5–5)
POTASSIUM SERPL-MCNC: 5.8 MMOL/L — HIGH (ref 3.5–5)
POTASSIUM SERPL-SCNC: 4.3 MMOL/L
POTASSIUM SERPL-SCNC: 4.3 MMOL/L — SIGNIFICANT CHANGE UP (ref 3.5–5)
POTASSIUM SERPL-SCNC: 4.5 MMOL/L
POTASSIUM SERPL-SCNC: 4.5 MMOL/L
POTASSIUM SERPL-SCNC: 5.2 MMOL/L — HIGH (ref 3.5–5)
POTASSIUM SERPL-SCNC: 5.3 MMOL/L — HIGH (ref 3.5–5)
POTASSIUM SERPL-SCNC: 5.8 MMOL/L — HIGH (ref 3.5–5)
PROCALCITONIN SERPL-MCNC: 14.1 NG/ML — HIGH (ref 0.02–0.1)
PROCALCITONIN SERPL-MCNC: 30.8 NG/ML — HIGH (ref 0.02–0.1)
PROT SERPL-MCNC: 4.4 G/DL — LOW (ref 6–8)
PROT SERPL-MCNC: 4.5 G/DL — LOW (ref 6–8)
PROT SERPL-MCNC: 4.7 G/DL — LOW (ref 6–8)
PROT SERPL-MCNC: 5.4 G/DL
PROT SERPL-MCNC: 5.5 G/DL
PROT SERPL-MCNC: 5.6 G/DL
PROTHROM AB SERPL-ACNC: 30.4 SEC — HIGH (ref 9.95–12.87)
PROTHROM AB SERPL-ACNC: 37.9 SEC — HIGH (ref 9.95–12.87)
RBC # BLD: 3.21 M/UL
RBC # BLD: 3.33 M/UL — LOW (ref 4.2–5.4)
RBC # BLD: 3.35 M/UL — LOW (ref 4.2–5.4)
RBC # BLD: 3.43 M/UL
RBC # BLD: 3.45 M/UL
RBC # BLD: 3.69 M/UL — LOW (ref 4.2–5.4)
RBC # FLD: 17.2 %
RBC # FLD: 17.4 %
RBC # FLD: 17.4 % — HIGH (ref 11.5–14.5)
RBC # FLD: 17.7 %
RBC # FLD: 18 % — HIGH (ref 11.5–14.5)
RBC # FLD: 18 % — HIGH (ref 11.5–14.5)
SARS-COV-2 RNA SPEC QL NAA+PROBE: SIGNIFICANT CHANGE UP
SODIUM SERPL-SCNC: 128 MMOL/L
SODIUM SERPL-SCNC: 130 MMOL/L
SODIUM SERPL-SCNC: 131 MMOL/L — LOW (ref 135–146)
SODIUM SERPL-SCNC: 133 MMOL/L — LOW (ref 135–146)
SODIUM SERPL-SCNC: 134 MMOL/L
SODIUM SERPL-SCNC: 134 MMOL/L — LOW (ref 135–146)
SODIUM SERPL-SCNC: 136 MMOL/L — SIGNIFICANT CHANGE UP (ref 135–146)
SPECIMEN SOURCE: SIGNIFICANT CHANGE UP
SPECIMEN SOURCE: SIGNIFICANT CHANGE UP
WBC # BLD: 13 K/UL — HIGH (ref 4.8–10.8)
WBC # BLD: 18.41 K/UL — HIGH (ref 4.8–10.8)
WBC # BLD: 2.99 K/UL — LOW (ref 4.8–10.8)
WBC # FLD AUTO: 13 K/UL — HIGH (ref 4.8–10.8)
WBC # FLD AUTO: 18.41 K/UL — HIGH (ref 4.8–10.8)
WBC # FLD AUTO: 2.99 K/UL — LOW (ref 4.8–10.8)
WBC # FLD AUTO: 5.52 K/UL
WBC # FLD AUTO: 7.06 K/UL
WBC # FLD AUTO: 9.73 K/UL

## 2022-01-01 PROCEDURE — 99231 SBSQ HOSP IP/OBS SF/LOW 25: CPT

## 2022-01-01 PROCEDURE — 99233 SBSQ HOSP IP/OBS HIGH 50: CPT

## 2022-01-01 PROCEDURE — 99223 1ST HOSP IP/OBS HIGH 75: CPT

## 2022-01-01 PROCEDURE — 99213 OFFICE O/P EST LOW 20 MIN: CPT

## 2022-01-01 PROCEDURE — 99285 EMERGENCY DEPT VISIT HI MDM: CPT

## 2022-01-01 PROCEDURE — 93010 ELECTROCARDIOGRAM REPORT: CPT

## 2022-01-01 PROCEDURE — 99222 1ST HOSP IP/OBS MODERATE 55: CPT

## 2022-01-01 PROCEDURE — 71045 X-RAY EXAM CHEST 1 VIEW: CPT | Mod: 26

## 2022-01-01 RX ORDER — SODIUM CHLORIDE 9 MG/ML
1000 INJECTION, SOLUTION INTRAVENOUS
Refills: 0 | Status: DISCONTINUED | OUTPATIENT
Start: 2022-01-01 | End: 2022-01-01

## 2022-01-01 RX ORDER — NOREPINEPHRINE BITARTRATE/D5W 8 MG/250ML
0.08 PLASTIC BAG, INJECTION (ML) INTRAVENOUS
Qty: 8 | Refills: 0 | Status: DISCONTINUED | OUTPATIENT
Start: 2022-01-01 | End: 2022-01-01

## 2022-01-01 RX ORDER — SODIUM CHLORIDE 9 MG/ML
500 INJECTION, SOLUTION INTRAVENOUS ONCE
Refills: 0 | Status: COMPLETED | OUTPATIENT
Start: 2022-01-01 | End: 2022-01-01

## 2022-01-01 RX ORDER — VANCOMYCIN HCL 1 G
1000 VIAL (EA) INTRAVENOUS ONCE
Refills: 0 | Status: COMPLETED | OUTPATIENT
Start: 2022-01-01 | End: 2022-01-01

## 2022-01-01 RX ORDER — DEXAMETHASONE 0.5 MG/5ML
12 ELIXIR ORAL ONCE
Refills: 0 | Status: COMPLETED | OUTPATIENT
Start: 2022-01-01 | End: 2022-01-01

## 2022-01-01 RX ORDER — OXYCODONE AND ACETAMINOPHEN 5; 325 MG/1; MG/1
1 TABLET ORAL EVERY 4 HOURS
Refills: 0 | Status: DISCONTINUED | OUTPATIENT
Start: 2022-01-01 | End: 2022-01-01

## 2022-01-01 RX ORDER — ONDANSETRON 8 MG/1
4 TABLET, FILM COATED ORAL EVERY 6 HOURS
Refills: 0 | Status: DISCONTINUED | OUTPATIENT
Start: 2022-01-01 | End: 2022-01-01

## 2022-01-01 RX ORDER — ONDANSETRON 8 MG/1
0 TABLET, FILM COATED ORAL
Qty: 0 | Refills: 0 | DISCHARGE

## 2022-01-01 RX ORDER — INFLUENZA VIRUS VACCINE 15; 15; 15; 15 UG/.5ML; UG/.5ML; UG/.5ML; UG/.5ML
0.5 SUSPENSION INTRAMUSCULAR ONCE
Refills: 0 | Status: DISCONTINUED | OUTPATIENT
Start: 2022-01-01 | End: 2022-01-01

## 2022-01-01 RX ORDER — NOREPINEPHRINE BITARTRATE/D5W 8 MG/250ML
0.1 PLASTIC BAG, INJECTION (ML) INTRAVENOUS
Qty: 8 | Refills: 0 | Status: DISCONTINUED | OUTPATIENT
Start: 2022-01-01 | End: 2022-01-01

## 2022-01-01 RX ORDER — ONDANSETRON 8 MG/1
4 TABLET, FILM COATED ORAL ONCE
Refills: 0 | Status: COMPLETED | OUTPATIENT
Start: 2022-01-01 | End: 2022-01-01

## 2022-01-01 RX ORDER — CEFTRIAXONE 500 MG/1
INJECTION, POWDER, FOR SOLUTION INTRAMUSCULAR; INTRAVENOUS
Refills: 0 | Status: DISCONTINUED | OUTPATIENT
Start: 2022-01-01 | End: 2022-01-01

## 2022-01-01 RX ORDER — CEFTRIAXONE 500 MG/1
2000 INJECTION, POWDER, FOR SOLUTION INTRAMUSCULAR; INTRAVENOUS EVERY 24 HOURS
Refills: 0 | Status: DISCONTINUED | OUTPATIENT
Start: 2022-01-01 | End: 2022-01-01

## 2022-01-01 RX ORDER — PHYTONADIONE (VIT K1) 5 MG
5 TABLET ORAL ONCE
Refills: 0 | Status: COMPLETED | OUTPATIENT
Start: 2022-01-01 | End: 2022-01-01

## 2022-01-01 RX ORDER — SODIUM CHLORIDE 9 MG/ML
1000 INJECTION, SOLUTION INTRAVENOUS ONCE
Refills: 0 | Status: COMPLETED | OUTPATIENT
Start: 2022-01-01 | End: 2022-01-01

## 2022-01-01 RX ORDER — PROCHLORPERAZINE MALEATE 10 MG/1
10 TABLET ORAL EVERY 6 HOURS
Qty: 30 | Refills: 3 | Status: ACTIVE | COMMUNITY
Start: 2022-01-01 | End: 1900-01-01

## 2022-01-01 RX ORDER — HYDROMORPHONE HYDROCHLORIDE 2 MG/ML
0.5 INJECTION INTRAMUSCULAR; INTRAVENOUS; SUBCUTANEOUS
Refills: 0 | Status: DISCONTINUED | OUTPATIENT
Start: 2022-01-01 | End: 2022-01-01

## 2022-01-01 RX ORDER — SODIUM ZIRCONIUM CYCLOSILICATE 10 G/10G
5 POWDER, FOR SUSPENSION ORAL ONCE
Refills: 0 | Status: COMPLETED | OUTPATIENT
Start: 2022-01-01 | End: 2022-01-01

## 2022-01-01 RX ORDER — DEXTROSE 10 % IN WATER 10 %
1000 INTRAVENOUS SOLUTION INTRAVENOUS
Refills: 0 | Status: DISCONTINUED | OUTPATIENT
Start: 2022-01-01 | End: 2022-01-01

## 2022-01-01 RX ORDER — MORPHINE SULFATE 50 MG/1
2 CAPSULE, EXTENDED RELEASE ORAL ONCE
Refills: 0 | Status: DISCONTINUED | OUTPATIENT
Start: 2022-01-01 | End: 2022-01-01

## 2022-01-01 RX ORDER — MORPHINE SULFATE 50 MG/1
4 CAPSULE, EXTENDED RELEASE ORAL
Refills: 0 | Status: DISCONTINUED | OUTPATIENT
Start: 2022-01-01 | End: 2022-01-01

## 2022-01-01 RX ORDER — OXYCODONE AND ACETAMINOPHEN 5; 325 MG/1; MG/1
1 TABLET ORAL EVERY 6 HOURS
Refills: 0 | Status: DISCONTINUED | OUTPATIENT
Start: 2022-01-01 | End: 2022-01-01

## 2022-01-01 RX ORDER — PACLITAXEL 100 MG/20ML
154 INJECTION, POWDER, LYOPHILIZED, FOR SUSPENSION INTRAVENOUS ONCE
Refills: 0 | Status: COMPLETED | OUTPATIENT
Start: 2022-01-01 | End: 2022-01-01

## 2022-01-01 RX ORDER — SPIRONOLACTONE 50 MG/1
50 TABLET ORAL DAILY
Qty: 30 | Refills: 1 | Status: ACTIVE | COMMUNITY
Start: 2021-01-01 | End: 1900-01-01

## 2022-01-01 RX ORDER — OXYCODONE AND ACETAMINOPHEN 5; 325 MG/1; MG/1
5-325 TABLET ORAL EVERY 6 HOURS
Qty: 30 | Refills: 0 | Status: ACTIVE | COMMUNITY
Start: 2022-01-01 | End: 1900-01-01

## 2022-01-01 RX ORDER — APIXABAN 5 MG/1
5 TABLET, FILM COATED ORAL
Qty: 90 | Refills: 1 | Status: ACTIVE | COMMUNITY
Start: 2022-01-01 | End: 1900-01-01

## 2022-01-01 RX ORDER — LIPASE/PROTEASE/AMYLASE 16-48-48K
1 CAPSULE,DELAYED RELEASE (ENTERIC COATED) ORAL
Refills: 0 | Status: DISCONTINUED | OUTPATIENT
Start: 2022-01-01 | End: 2022-01-01

## 2022-01-01 RX ORDER — CEFTRIAXONE 500 MG/1
2000 INJECTION, POWDER, FOR SOLUTION INTRAMUSCULAR; INTRAVENOUS ONCE
Refills: 0 | Status: COMPLETED | OUTPATIENT
Start: 2022-01-01 | End: 2022-01-01

## 2022-01-01 RX ORDER — SODIUM CHLORIDE 9 MG/ML
500 INJECTION INTRAMUSCULAR; INTRAVENOUS; SUBCUTANEOUS ONCE
Refills: 0 | Status: COMPLETED | OUTPATIENT
Start: 2022-01-01 | End: 2022-01-01

## 2022-01-01 RX ORDER — CEFEPIME 1 G/1
1000 INJECTION, POWDER, FOR SOLUTION INTRAMUSCULAR; INTRAVENOUS EVERY 12 HOURS
Refills: 0 | Status: DISCONTINUED | OUTPATIENT
Start: 2022-01-01 | End: 2022-01-01

## 2022-01-01 RX ORDER — MORPHINE SULFATE 50 MG/1
2 CAPSULE, EXTENDED RELEASE ORAL EVERY 8 HOURS
Refills: 0 | Status: DISCONTINUED | OUTPATIENT
Start: 2022-01-01 | End: 2022-01-01

## 2022-01-01 RX ORDER — ONDANSETRON 8 MG/1
8 TABLET ORAL EVERY 8 HOURS
Qty: 30 | Refills: 3 | Status: ACTIVE | COMMUNITY
Start: 2022-01-01 | End: 1900-01-01

## 2022-01-01 RX ORDER — VANCOMYCIN HCL 1 G
1000 VIAL (EA) INTRAVENOUS EVERY 24 HOURS
Refills: 0 | Status: DISCONTINUED | OUTPATIENT
Start: 2022-01-01 | End: 2022-01-01

## 2022-01-01 RX ORDER — OXYCODONE HYDROCHLORIDE 5 MG/1
10 TABLET ORAL EVERY 12 HOURS
Refills: 0 | Status: DISCONTINUED | OUTPATIENT
Start: 2022-01-01 | End: 2022-01-01

## 2022-01-01 RX ORDER — FUROSEMIDE 20 MG/1
20 TABLET ORAL DAILY
Qty: 30 | Refills: 1 | Status: ACTIVE | COMMUNITY
Start: 2021-01-01 | End: 1900-01-01

## 2022-01-01 RX ORDER — GEMCITABINE 38 MG/ML
1540 INJECTION, SOLUTION INTRAVENOUS ONCE
Refills: 0 | Status: COMPLETED | OUTPATIENT
Start: 2022-01-01 | End: 2022-01-01

## 2022-01-01 RX ORDER — MORPHINE SULFATE 50 MG/1
4 CAPSULE, EXTENDED RELEASE ORAL ONCE
Refills: 0 | Status: DISCONTINUED | OUTPATIENT
Start: 2022-01-01 | End: 2022-01-01

## 2022-01-01 RX ORDER — CHLORHEXIDINE GLUCONATE 213 G/1000ML
1 SOLUTION TOPICAL
Refills: 0 | Status: DISCONTINUED | OUTPATIENT
Start: 2022-01-01 | End: 2022-01-01

## 2022-01-01 RX ORDER — PANTOPRAZOLE SODIUM 20 MG/1
40 TABLET, DELAYED RELEASE ORAL
Refills: 0 | Status: DISCONTINUED | OUTPATIENT
Start: 2022-01-01 | End: 2022-01-01

## 2022-01-01 RX ORDER — MAGNESIUM SULFATE 500 MG/ML
1 VIAL (ML) INJECTION ONCE
Refills: 0 | Status: COMPLETED | OUTPATIENT
Start: 2022-01-01 | End: 2022-01-01

## 2022-01-01 RX ADMIN — PACLITAXEL 61.6 MILLIGRAM(S): 100 INJECTION, POWDER, LYOPHILIZED, FOR SUSPENSION INTRAVENOUS at 13:20

## 2022-01-01 RX ADMIN — MORPHINE SULFATE 4 MILLIGRAM(S): 50 CAPSULE, EXTENDED RELEASE ORAL at 06:49

## 2022-01-01 RX ADMIN — MORPHINE SULFATE 2 MILLIGRAM(S): 50 CAPSULE, EXTENDED RELEASE ORAL at 21:28

## 2022-01-01 RX ADMIN — OXYCODONE AND ACETAMINOPHEN 1 TABLET(S): 5; 325 TABLET ORAL at 04:00

## 2022-01-01 RX ADMIN — SODIUM CHLORIDE 1000 MILLILITER(S): 9 INJECTION INTRAMUSCULAR; INTRAVENOUS; SUBCUTANEOUS at 14:19

## 2022-01-01 RX ADMIN — PACLITAXEL 61.6 MILLIGRAM(S): 100 INJECTION, POWDER, LYOPHILIZED, FOR SUSPENSION INTRAVENOUS at 15:13

## 2022-01-01 RX ADMIN — Medication 12 MILLIGRAM(S): at 11:49

## 2022-01-01 RX ADMIN — Medication 8.16 MICROGRAM(S)/KG/MIN: at 23:25

## 2022-01-01 RX ADMIN — Medication 101 MILLIGRAM(S): at 11:38

## 2022-01-01 RX ADMIN — MORPHINE SULFATE 4 MILLIGRAM(S): 50 CAPSULE, EXTENDED RELEASE ORAL at 09:42

## 2022-01-01 RX ADMIN — CEFEPIME 100 MILLIGRAM(S): 1 INJECTION, POWDER, FOR SOLUTION INTRAMUSCULAR; INTRAVENOUS at 05:28

## 2022-01-01 RX ADMIN — HYDROMORPHONE HYDROCHLORIDE 0.5 MILLIGRAM(S): 2 INJECTION INTRAMUSCULAR; INTRAVENOUS; SUBCUTANEOUS at 09:34

## 2022-01-01 RX ADMIN — Medication 1 TABLET(S): at 11:53

## 2022-01-01 RX ADMIN — Medication 1 CAPSULE(S): at 11:51

## 2022-01-01 RX ADMIN — Medication 122 MILLIGRAM(S): at 11:35

## 2022-01-01 RX ADMIN — GEMCITABINE 1540 MILLIGRAM(S): 38 INJECTION, SOLUTION INTRAVENOUS at 14:40

## 2022-01-01 RX ADMIN — GEMCITABINE 387.37 MILLIGRAM(S): 38 INJECTION, SOLUTION INTRAVENOUS at 13:55

## 2022-01-01 RX ADMIN — PACLITAXEL 154 MILLIGRAM(S): 100 INJECTION, POWDER, LYOPHILIZED, FOR SUSPENSION INTRAVENOUS at 13:50

## 2022-01-01 RX ADMIN — HYDROMORPHONE HYDROCHLORIDE 0.5 MILLIGRAM(S): 2 INJECTION INTRAMUSCULAR; INTRAVENOUS; SUBCUTANEOUS at 09:31

## 2022-01-01 RX ADMIN — Medication 0.5 MILLIGRAM(S): at 18:28

## 2022-01-01 RX ADMIN — Medication 101 MILLIGRAM(S): at 10:34

## 2022-01-01 RX ADMIN — Medication 122 MILLIGRAM(S): at 14:33

## 2022-01-01 RX ADMIN — Medication 122 MILLIGRAM(S): at 11:34

## 2022-01-01 RX ADMIN — CEFEPIME 100 MILLIGRAM(S): 1 INJECTION, POWDER, FOR SOLUTION INTRAMUSCULAR; INTRAVENOUS at 17:19

## 2022-01-01 RX ADMIN — CHLORHEXIDINE GLUCONATE 1 APPLICATION(S): 213 SOLUTION TOPICAL at 06:48

## 2022-01-01 RX ADMIN — SODIUM CHLORIDE 70 MILLILITER(S): 9 INJECTION, SOLUTION INTRAVENOUS at 19:38

## 2022-01-01 RX ADMIN — PACLITAXEL 154 MILLIGRAM(S): 100 INJECTION, POWDER, LYOPHILIZED, FOR SUSPENSION INTRAVENOUS at 13:40

## 2022-01-01 RX ADMIN — PACLITAXEL 61.6 MILLIGRAM(S): 100 INJECTION, POWDER, LYOPHILIZED, FOR SUSPENSION INTRAVENOUS at 13:10

## 2022-01-01 RX ADMIN — Medication 12 MILLIGRAM(S): at 11:50

## 2022-01-01 RX ADMIN — CEFTRIAXONE 100 MILLIGRAM(S): 500 INJECTION, POWDER, FOR SOLUTION INTRAMUSCULAR; INTRAVENOUS at 20:25

## 2022-01-01 RX ADMIN — CEFEPIME 100 MILLIGRAM(S): 1 INJECTION, POWDER, FOR SOLUTION INTRAMUSCULAR; INTRAVENOUS at 17:26

## 2022-01-01 RX ADMIN — MORPHINE SULFATE 2 MILLIGRAM(S): 50 CAPSULE, EXTENDED RELEASE ORAL at 02:52

## 2022-01-01 RX ADMIN — OXYCODONE AND ACETAMINOPHEN 1 TABLET(S): 5; 325 TABLET ORAL at 02:59

## 2022-01-01 RX ADMIN — HYDROMORPHONE HYDROCHLORIDE 0.5 MILLIGRAM(S): 2 INJECTION INTRAMUSCULAR; INTRAVENOUS; SUBCUTANEOUS at 23:13

## 2022-01-01 RX ADMIN — Medication 5.1 MICROGRAM(S)/KG/MIN: at 04:51

## 2022-01-01 RX ADMIN — CHLORHEXIDINE GLUCONATE 1 APPLICATION(S): 213 SOLUTION TOPICAL at 05:02

## 2022-01-01 RX ADMIN — Medication 1 CAPSULE(S): at 09:05

## 2022-01-01 RX ADMIN — ONDANSETRON 4 MILLIGRAM(S): 8 TABLET, FILM COATED ORAL at 12:52

## 2022-01-01 RX ADMIN — ONDANSETRON 4 MILLIGRAM(S): 8 TABLET, FILM COATED ORAL at 04:26

## 2022-01-01 RX ADMIN — Medication 70 MILLILITER(S): at 13:14

## 2022-01-01 RX ADMIN — SODIUM ZIRCONIUM CYCLOSILICATE 5 GRAM(S): 10 POWDER, FOR SUSPENSION ORAL at 17:25

## 2022-01-01 RX ADMIN — GEMCITABINE 387.37 MILLIGRAM(S): 38 INJECTION, SOLUTION INTRAVENOUS at 14:00

## 2022-01-01 RX ADMIN — CEFEPIME 100 MILLIGRAM(S): 1 INJECTION, POWDER, FOR SOLUTION INTRAMUSCULAR; INTRAVENOUS at 07:52

## 2022-01-01 RX ADMIN — Medication 250 MILLIGRAM(S): at 17:24

## 2022-01-01 RX ADMIN — MORPHINE SULFATE 2 MILLIGRAM(S): 50 CAPSULE, EXTENDED RELEASE ORAL at 22:56

## 2022-01-01 RX ADMIN — GEMCITABINE 387.37 MILLIGRAM(S): 38 INJECTION, SOLUTION INTRAVENOUS at 15:13

## 2022-01-01 RX ADMIN — Medication 100 GRAM(S): at 17:13

## 2022-01-01 RX ADMIN — SODIUM CHLORIDE 1000 MILLILITER(S): 9 INJECTION, SOLUTION INTRAVENOUS at 15:17

## 2022-01-01 RX ADMIN — Medication 250 MILLIGRAM(S): at 05:28

## 2022-01-01 RX ADMIN — HYDROMORPHONE HYDROCHLORIDE 0.5 MILLIGRAM(S): 2 INJECTION INTRAMUSCULAR; INTRAVENOUS; SUBCUTANEOUS at 18:29

## 2022-01-01 RX ADMIN — OXYCODONE HYDROCHLORIDE 10 MILLIGRAM(S): 5 TABLET ORAL at 09:00

## 2022-01-01 RX ADMIN — MORPHINE SULFATE 2 MILLIGRAM(S): 50 CAPSULE, EXTENDED RELEASE ORAL at 01:32

## 2022-01-01 RX ADMIN — MORPHINE SULFATE 4 MILLIGRAM(S): 50 CAPSULE, EXTENDED RELEASE ORAL at 12:52

## 2022-01-01 RX ADMIN — HYDROMORPHONE HYDROCHLORIDE 0.5 MILLIGRAM(S): 2 INJECTION INTRAMUSCULAR; INTRAVENOUS; SUBCUTANEOUS at 18:41

## 2022-01-01 RX ADMIN — SODIUM CHLORIDE 500 MILLILITER(S): 9 INJECTION, SOLUTION INTRAVENOUS at 18:50

## 2022-01-01 RX ADMIN — GEMCITABINE 1540 MILLIGRAM(S): 38 INJECTION, SOLUTION INTRAVENOUS at 14:45

## 2022-01-01 RX ADMIN — SODIUM CHLORIDE 75 MILLILITER(S): 9 INJECTION, SOLUTION INTRAVENOUS at 06:49

## 2022-01-10 NOTE — PHYSICAL EXAM
[Ambulatory and capable of all self care but unable to carry out any work activities] : Status 2- Ambulatory and capable of all self care but unable to carry out any work activities. Up and about more than 50% of waking hours [Cachectic] : cachectic [Normal] : affect appropriate [de-identified] : Cachectic , weak , frail , with anasarca  [de-identified] : b/l LE edema better and improving with lasix  [de-identified] : soft distended . Non tender [de-identified] : LE edema is much better and improved

## 2022-01-10 NOTE — ASSESSMENT
[FreeTextEntry1] : 61 year old female with advanced pancreatic cancer with obstructive jaundice , portal vein encasement  and dilated CBD s/p metallic stent . splenomegaly and suspected varices. ECOG 2 to 3 .\par S/P FOlFIRINOX X 5 poorly tolerated due to severe diarrhea, hypokalemia, and marked weight loss. Started on Gemcitibine s/p Cycle 1 with delayed D8 due to thrombocytopenia : developed large volume ascites and anasarca sec to obstruction due to  Portal vein thrombosis \par Imaging consistent with progression and new thrombosis of Portal Vein \par On Eliquis \par Platelets recovered \par Now on [place]- Abraxane and has completed 2 cylces so far. \par Last imaging was in Dec 2021. \par \par \par Plan: \par -- CBC reviewed and stable. Proceed with cycle 3. \par -- C/w with Lasix 20 mg PO daily and Spirinolactone 50 mg daily. (Dosage was reduced in Dec)\par --Recommended to c/w Eliquis. \par -- Will check CMP,  and CEA\par \par Case was seen and discussed with Dr. Hayward who agreed with the assessment and plan.\par RTC in 2 weeks. \par \par \par

## 2022-01-10 NOTE — HISTORY OF PRESENT ILLNESS
[de-identified] : 61 year old white female self referred with advanced pancreatic cancer presents for second opinion and to transfer her oncologic care . PMH is significant for hypertension , diagnosed with metastatic pancreatic cancer in May 2021 when she presented with diarrhea , indigestion and  progressive weight loss. PET scan showed mass involving neck and body of pancreas with regional lymph node encasing SMA in addition to left para aortic node in mid abdomen . splenomegaly and suspected early varices suggestive of splenic/portal vein thrombosis . biliary duct dilatation due to peripancreatic and and [place] hepatis adenopathy . \par EUS with biopsy confirmed pancreatic adenocarcinoma , she was found with involvement of portal vein and CBD . ERCP was done with placement of metallic stent . \par She has received this far 5 cycles of chemotherapy ( likely FOLFIRINOX ) complicated with severe diarrhea , electrolyte imbalance , partially controlled with lomotil , stopped due to dizziness and loss of appetite . She lost nearly 40 lbs and complains of generalized weakness, no fever, abdominal or back pain , no itching . She takes pancreatic enzymes one with meals , ensure one daily in addition to mineral supplements . She ambulates without assistance and is  capable of limited self care .\par Meds : kcl , pancreatic enzymes . ( HCTZ was discontinued )  [de-identified] : 11/29/2021: BUSHRA is here for follow up visit for advanced pancreatic cancer S/P FOlFIRINOX X 5 poorly tolerated due to severe diarrhea, hypokalemia, and marked weight loss. Recieved 1 cycle of Gemcitabine with D8 delayed due to thrombocytopenia. She is here today for cycle 2 of treatment. Patient noted to have + 3 pitting edema of the B/L LE and abdominal distention with areas of pitting suggestive of obstruction (tumor burden vs thrombosis) CT of Abd from 10/12/21 showed encasement of the celiac artery, common hepatic, proper hepatic, and right hepatic splenic arteries; vessels remained patent at that time. Main portal vein viewed narrow. Patient denies SOB, cough, leg pain, unusual bleeding or bruising, or abdominal pain at this time. CBC reviewed thrombocytopenia noted. \par \par 12/06/21\par Pt returns for foloow up today , she was discharged from hospital on 12/03 . On her last visit on 11/29 , she was noted to have diffuse anasarca and abdominal distension . She was referred to ER . Imaging showed large volume ascites .\par IMPRESSION:\par Large volume ascites as well as anasarca.\par Moderate right and small left pleural effusions.\par Right hepatic hyperdensities are noted, largest measuring 1.4 cm. Correlation with prior abdominal CT scans is recommended.\par Rind of soft tissue surrounding proximal celiac artery as well as pancreatic ductal dilatation consistent with extension of neoplasm.\par Soft tissue density within CBD stent with associated mild to moderate intrahepatic biliary ductal dilatation.\par Evaluation for patency of portal venous structures is limited secondary to arterial phase of contrast administration. However, neither portal vein nor IVC are dilated\par Severely attenuated barely perceptible portal confluence, presumably secondary to adjacent soft tissue attenuation. 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 A small subcentimeter focus of thrombus is noted in the main portal vein superior to the splenic confluence with patent left and right main portal veins. Patent SMV and proximal jejunal branches. Left upper quadrant collateral vessels are noted.\par \par Pt had therapeutic paracentesis by IR and about 7L of ascitic fluid was removed . Pt was given aggressive IV diuresis . She was also started on Eliquis for portal vein thrombosis . While in the hospital her platelet counts recovered . She has been taking diuretics at home , reports has good appetite and is well compliant with medication . She will be scheduled by IR for repeat paracentesis . \par \par 12/27/21\par Pt is here today for follow up visit for advanced pancreatic cancer and chemotherapy - Gemzar & Abraxane.\par Pt states she tolerated first cycle fairly well.  She lost 38 lbs from partial resolution of her ascites, s/p paracentesis x 1.\par Denies any pain, fever, N/V/C/D.  Appetite fair.\par Previous labs and scans reviewed.\par \par 1/10/22- Patient is here for follow up for her stage 4 pancreatic cancer. She is on chemo with [place]-Abraxane and has completed 2 cycles so far. She is tolerating this regimen well. No fever, chills, vomiting, diarrhea, nausea. Her appetite is good as well. She is also on eliquis for portal vein thrombosis. She has gained 5lbs since last visit. Her abdomen is slightly more distended. Her last paracentesis was in dec when 7 liters was removed. Her LE edema is much better now with 20mg PO lasix and 50 mg spirinolactone.

## 2022-01-10 NOTE — REVIEW OF SYSTEMS
[Fatigue] : fatigue [Lower Ext Edema] : lower extremity edema [Negative] : Neurological [FreeTextEntry7] : Abdomenial distension

## 2022-01-24 PROBLEM — I81 PORTAL VEIN THROMBOSIS: Status: ACTIVE | Noted: 2021-01-01

## 2022-01-24 PROBLEM — Z51.11 ENCOUNTER FOR ANTINEOPLASTIC CHEMOTHERAPY: Status: ACTIVE | Noted: 2021-01-01

## 2022-01-24 PROBLEM — D49.0: Status: ACTIVE | Noted: 2021-01-01

## 2022-01-24 NOTE — HISTORY OF PRESENT ILLNESS
[de-identified] : 61 year old white female self referred with advanced pancreatic cancer presents for second opinion and to transfer her oncologic care . PMH is significant for hypertension , diagnosed with metastatic pancreatic cancer in May 2021 when she presented with diarrhea , indigestion and  progressive weight loss. PET scan showed mass involving neck and body of pancreas with regional lymph node encasing SMA in addition to left para aortic node in mid abdomen . splenomegaly and suspected early varices suggestive of splenic/portal vein thrombosis . biliary duct dilatation due to peripancreatic and and [place] hepatis adenopathy . \par EUS with biopsy confirmed pancreatic adenocarcinoma , she was found with involvement of portal vein and CBD . ERCP was done with placement of metallic stent . \par She has received this far 5 cycles of chemotherapy ( likely FOLFIRINOX ) complicated with severe diarrhea , electrolyte imbalance , partially controlled with lomotil , stopped due to dizziness and loss of appetite . She lost nearly 40 lbs and complains of generalized weakness, no fever, abdominal or back pain , no itching . She takes pancreatic enzymes one with meals , ensure one daily in addition to mineral supplements . She ambulates without assistance and is  capable of limited self care .\par Meds : kcl , pancreatic enzymes . ( HCTZ was discontinued )  [de-identified] : 11/29/2021: BUSHRA is here for follow up visit for advanced pancreatic cancer S/P FOlFIRINOX X 5 poorly tolerated due to severe diarrhea, hypokalemia, and marked weight loss. Recieved 1 cycle of Gemcitabine with D8 delayed due to thrombocytopenia. She is here today for cycle 2 of treatment. Patient noted to have + 3 pitting edema of the B/L LE and abdominal distention with areas of pitting suggestive of obstruction (tumor burden vs thrombosis) CT of Abd from 10/12/21 showed encasement of the celiac artery, common hepatic, proper hepatic, and right hepatic splenic arteries; vessels remained patent at that time. Main portal vein viewed narrow. Patient denies SOB, cough, leg pain, unusual bleeding or bruising, or abdominal pain at this time. CBC reviewed thrombocytopenia noted. \par \par 12/06/21\par Pt returns for foloow up today , she was discharged from hospital on 12/03 . On her last visit on 11/29 , she was noted to have diffuse anasarca and abdominal distension . She was referred to ER . Imaging showed large volume ascites .\par IMPRESSION:\par Large volume ascites as well as anasarca.\par Moderate right and small left pleural effusions.\par Right hepatic hyperdensities are noted, largest measuring 1.4 cm. Correlation with prior abdominal CT scans is recommended.\par Rind of soft tissue surrounding proximal celiac artery as well as pancreatic ductal dilatation consistent with extension of neoplasm.\par Soft tissue density within CBD stent with associated mild to moderate intrahepatic biliary ductal dilatation.\par Evaluation for patency of portal venous structures is limited secondary to arterial phase of contrast administration. However, neither portal vein nor IVC are dilated\par Severely attenuated barely perceptible portal confluence, presumably secondary to adjacent soft tissue attenuation. 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 A small subcentimeter focus of thrombus is noted in the main portal vein superior to the splenic confluence with patent left and right main portal veins. Patent SMV and proximal jejunal branches. Left upper quadrant collateral vessels are noted.\par \par Pt had therapeutic paracentesis by IR and about 7L of ascitic fluid was removed . Pt was given aggressive IV diuresis . She was also started on Eliquis for portal vein thrombosis . While in the hospital her platelet counts recovered . She has been taking diuretics at home , reports has good appetite and is well compliant with medication . She will be scheduled by IR for repeat paracentesis . \par \par 12/27/21\par Pt is here today for follow up visit for advanced pancreatic cancer and chemotherapy - Gemzar & Abraxane.\par Pt states she tolerated first cycle fairly well.  She lost 38 lbs from partial resolution of her ascites, s/p paracentesis x 1.\par Denies any pain, fever, N/V/C/D.  Appetite fair.\par Previous labs and scans reviewed.\par \par 1/10/22- Patient is here for follow up for her stage 4 pancreatic cancer. She is on chemo with [place]-Abraxane and has completed 2 cycles so far. She is tolerating this regimen well. No fever, chills, vomiting, diarrhea, nausea. Her appetite is good as well. She is also on eliquis for portal vein thrombosis. She has gained 5lbs since last visit. Her abdomen is slightly more distended. Her last paracentesis was in dec when 7 liters was removed. Her LE edema is much better now with 20mg PO lasix and 50 mg spirinolactone. \par \par 1/24/22-  Patient is here for follow up for her stage 4 pancreatic cancer. She is on chemo with [place]-Abraxane and has completed 3 cycles so far. Her CA 19-9 is down from 600 to 105 now. She is tolerating the treatment well except for fatigue. Her abdomen is slightly more distended than last visit. Her last paracentesis was in dec when 7 liters was removed. Her LE edema is much better now with 20mg PO lasix and 50 mg spirinolactone.

## 2022-01-24 NOTE — REVIEW OF SYSTEMS
[Fatigue] : fatigue [Negative] : Neurological [Lower Ext Edema] : no lower extremity edema [FreeTextEntry5] : LE edema has resolved  [FreeTextEntry7] : Abdominal distension +

## 2022-01-24 NOTE — PHYSICAL EXAM
[Ambulatory and capable of all self care but unable to carry out any work activities] : Status 2- Ambulatory and capable of all self care but unable to carry out any work activities. Up and about more than 50% of waking hours [Cachectic] : cachectic [Normal] : affect appropriate [de-identified] : Cachectic , weak , frail , with anasarca  [de-identified] : b/l LE edema better and improved with diuretics  [de-identified] : soft distended . Non tender [de-identified] : LE edema is much better and improved

## 2022-01-24 NOTE — ASSESSMENT
[FreeTextEntry1] : 61 year old female with advanced pancreatic cancer with obstructive jaundice , portal vein encasement  and dilated CBD s/p metallic stent . splenomegaly and suspected varices. ECOG 2 to 3 .\par S/P FOlFIRINOX X 5 poorly tolerated due to severe diarrhea, hypokalemia, and marked weight loss. Started on Gemcitabine s/p Cycle 1 with delayed D8 due to thrombocytopenia : developed large volume ascites and anasarca sec to obstruction due to  Portal vein thrombosis \par Imaging consistent with progression and new thrombosis of Portal Vein \par On Eliquis \par Platelets recovered \par Now on [place]- Abraxane \par Last imaging was in Dec 2021. \par \par \par Plan: \par -- CBC reviewed and stable. Proceed with cycle 4 - [place] Abraxane every 2 weeks \par -- C/w with Lasix 20 mg PO daily and Spirinolactone 50 mg daily. (Dosage was reduced in Dec)\par --Recommended to c/w Eliquis. \par -- Will check CMP,  with every cycle \par -- Will plan to repeat imaging in 4 weeks \par -- Will refer for paracentesis or increase diuretics if her abdominal distension gets worse- can monitor for now. \par \par Case was seen and discussed with Dr. Hayward who agreed with the assessment and plan.\par RTC in 4 weeks. \par \par \par

## 2022-02-17 NOTE — ED ADULT NURSE REASSESSMENT NOTE - NS ED NURSE REASSESS COMMENT FT1
Pt reassessed A/O times 4 Vs stable report filling the same comfort provide on the bed assistance with ADL ,safety precaution on progress ,pt is seen evaluate by ED attending admitted to medicine waiting for bed on going nursing observation .

## 2022-02-17 NOTE — H&P ADULT - ATTENDING COMMENTS
HPI:  60 yo woman with a PMH of metastatic pancreatic cancer on chemotherapy (Dx 7/2021, follows Dr. Bridges, last dose 2 days ago) complicated by splenomegaly/ ascites on lasix and paracentesis (last done in Dec 2021) in the past and splenic/portal vein thrombosis on eliquis, HTN admitted for asthenia and failure to thrive. She noted sliding off the commode due to her severe weakness but denied any trauma. In addition to her severe weakness she notes having a very poor appetite, increased nausea. Her last chemo was 2 days ago ut the symptoms were prevent even before the chemo. Her abdominal has steadily been getting larger, and she notes increasing abdominal pain over the last several days, same quality os her chronic cancer pain but increased. No fevers, chills, CP, SOB, diarrhea, LE pain, dysuria.   her main complaint at this time is nausea   Noted to be hypotensive in the ED, with hyponatremia, and hypomagnesemia.   Neutropenic, tachycardic, and hypothermic (tlow 96)      REVIEW OF SYSTEMS:  CONSTITUTIONAL:  +weakness, no fevers, chills, night sweats, weight loss  EYES/ENT: No visual changes. No vertigo or dysphagia  NECK: No neck pain or stiffness  RESPIRATORY: No cough, wheezing, hemoptysis. No shortness of breath  CARDIOVASCULAR: No chest pain or palpitations. No lower extremity edema  GASTROINTESTINAL: +abdominal pain, nausea, vomiting, no diarrhea, or hematemesis  GENITOURINARY: No dysuria or hematuria   NEUROLOGICAL: No focal numbness or weakness  SKIN: No rashes or itching  HEMATOLOGIC: No easy bruising or prolonged bleeding.      PHYSICAL EXAM:  GENERAL: Distressed, cachectic, tired but Non-toxic, frail, stated age   HEAD:  Atraumatic, severe temporal wasting   EYES: EOMI, Sclera White   NECK: Supple, No JVD  CHEST/LUNG: Clear to auscultation bilaterally; No wheezing, rhonchi, or crackles  HEART: tachycardic and regular rhythm; s1, s2, No murmurs, rubs, or gallops  ABDOMEN: Soft, Nontender, Nondistended; Bowel sounds present, No rebound or guarding noted   EXTREMITIES: +2 pitting  lower extremity edema. No calf tenderness to palpation.  No clubbing or cyanosis  PSYCH: AAOx3, pleasant, cooperative, not anxious  NEUROLOGY: 5/5 strength in all extremities, no downward drift. Sensation grossly intact.   SKIN: No rashes or lesions      ASSESSMENT AND PLAN:  Asthenia   Failure to Thrive   Hyponatremia / Hypomagnesemia --> likely secondary to poor intake and diuretic use   Metastatic Pancreatic cancer on chemo   Ascites   Malnutrition   -Heme-onc follow up   -Would likely benefit from therapeutic and diagnostic paracentesis when INR improved   -Cont with IV Fluids, hold lasix for now   -Start multivitamin and needs nutrition eval     SIRS --> no clear source (tachy, hypothermic, and neutropenic)  Hypotension --> suspected its secondary to hypovolemia   Acute hypoxic respiratory failure --> ?secondary to her ascites. CXR ok   -Agree rocephin coverage now   -Follow up blood, urine cultures, UA  -Paracentesis when INR <2   -On eliquis at home, unlikely to be acute PE, check LE venous duplex.     Portal vein thrombosis: on ellquis     DVT pps: on eliquis, INR 2.6   GI ppx: continue home ppi   GOC: Full code ofor now. She is too tired to discuss en of life care at this time .    My note supersedes the residents in the event of a discrepancy.

## 2022-02-17 NOTE — H&P ADULT - HISTORY OF PRESENT ILLNESS
62 yo female with PMH of metastatic pancreatic cancer on chemotherapy (Dx 7/2021, follows Dr. Hayward) complicated by splenomegaly and varices   and splenic/portal vein thrombosis on eliquis, HTN presents to the hospital with       T(C): 36.7 , HR: 122, BP: 83/56 , RR: 20 , SpO2: 92% on room air   Labs: Na 131, INR 2.67   62 yo female with PMH of metastatic pancreatic cancer on chemotherapy (Dx 7/2021, follows Dr. Hayward) complicated by splenomegaly and varices   and splenic/portal vein thrombosis on eliquis, HTN presents to the hospital after sliding off the commode due to difficulty getting up. The patient reports       T(C): 36.7 , HR: 122, BP: 83/56 , RR: 20 , SpO2: 92% on room air   Labs: Na 131, INR 2.67   62 yo female with PMH of metastatic pancreatic cancer on chemotherapy (Dx 7/2021, follows Dr. 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 The patient denies a fall ,hitting her head or LOC. She denies any pain anywhere. Reports that she has had generalized weakness for the last few days and has had decreased po intake. She denies headache, chest pain, sob, abd pain, urinary frequency, urgency, constipation or diarrhea. All ros negative except as above.       T(C): 36.7 , HR: 122, BP: 83/56 , RR: 20 , SpO2: 92% on room air   Labs: Na 131, INR 2.67  CXR: unremarkable     Hernia

## 2022-02-17 NOTE — GOALS OF CARE CONVERSATION - ADVANCED CARE PLANNING - CONVERSATION DETAILS
Discussed with patient code state and current medical diagnosis and management.   The patient wishes to remain full code at this time. Discussed with patient code status and current medical diagnosis and management.   The patient wishes to remain full code at this time.

## 2022-02-17 NOTE — ED ADULT NURSE NOTE - NSIMPLEMENTINTERV_GEN_ALL_ED
Implemented All Fall with Harm Risk Interventions:  [place] to call system. Call bell, personal items and telephone within reach. Instruct patient to call for assistance. Room bathroom lighting operational. Non-slip footwear when patient is off stretcher. Physically safe environment: no spills, clutter or unnecessary equipment. Stretcher in lowest position, wheels locked, appropriate side rails in place. Provide visual cue, wrist band, yellow gown, etc. Monitor gait and stability. Monitor for mental status changes and reorient to person, place, and time. Review medications for side effects contributing to fall risk. Reinforce activity limits and safety measures with patient and family. Provide visual clues: red socks.

## 2022-02-17 NOTE — ED PROVIDER NOTE - PHYSICAL EXAMINATION
CONSTITUTIONAL: cachectic appearing female, pale, no acute distress  SKIN: skin exam is warm and dry  HEAD: Normocephalic; atraumatic.  EYES: PERRL, conjunctiva and sclera clear.  ENT: Dry MM   NECK: ROM intact.  CARD: S1, S2 normal, no murmur  RESP: No wheezes, rales or rhonchi. Good air movement bilaterally  ABD: distended abdomen, non-tender, no rebound  EXT: Normal ROM.    NEURO: awake, alert, following commands, oriented, grossly unremarkable. No Focal deficits. GCS 15.   PSYCH: Cooperative, appropriate.

## 2022-02-17 NOTE — ED PROVIDER NOTE - ATTENDING CONTRIBUTION TO CARE
62 y/o female h/o HTN, pancreatic CA with mets (on chemo, odaimi), portal vein thrombosis, ascites p/w generalized weakness x 5 days, persistent, denies modifying factors, also reports worsening abdominal distension, denies fever, abdominal pain, n/v/d, anorexia, cough, respiratory sx, change in bowel habits or urinary sx, vaginal d/c or other associated complaints at present.     Old chart reviewed.  I have reviewed and agree with the initial nursing note, except as documented in my note.    VSS, awake, alert, cachectic, oropharynx clear, dry oral mucosa, chest CTAB, non-labored breathing, no w/r/r, +S1/S2, RRR, no m/r/g, abdomen soft, NT, distended, +BS, no CVA tenderness, no peripheral edema or deformities, alert, clear speech.

## 2022-02-17 NOTE — ED PROVIDER NOTE - CARE PLAN
1 Principal Discharge DX:	Nausea and vomiting  Secondary Diagnosis:	Generalized weakness  Secondary Diagnosis:	Fall

## 2022-02-17 NOTE — ED ADULT NURSE NOTE - OBJECTIVE STATEMENT
Pt HX- pancreatic CA with chronic abdominal pain C/O worsening abdomen pain for 4  days  increase of weakness state fall this mornind in the bathroom put her self on the floor  unable to get up ,denies injury from the fall  no LOC  denies fever or chills .

## 2022-02-17 NOTE — H&P ADULT - NSHPPHYSICALEXAM_GEN_ALL_CORE
Vital Signs (24 Hrs):  T(C): 36.7 (02-17-22 @ 16:15), Max: 36.7 (02-17-22 @ 16:15)  HR: 122 (02-17-22 @ 16:15) (99 - 122)  BP: 83/56 (02-17-22 @ 16:15) (83/56 - 95/60)  RR: 20 (02-17-22 @ 16:15) (18 - 20)  SpO2: 92% (02-17-22 @ 16:15) (92% - 96%)  Wt(kg): --  Daily Height in cm: 165.1 (17 Feb 2022 11:30)    Daily     I&O's Summary Vital Signs (24 Hrs):  T(C): 36.7 (02-17-22 @ 16:15), Max: 36.7 (02-17-22 @ 16:15)  HR: 122 (02-17-22 @ 16:15) (99 - 122)  BP: 83/56 (02-17-22 @ 16:15) (83/56 - 95/60)  RR: 20 (02-17-22 @ 16:15) (18 - 20)  SpO2: 92% (02-17-22 @ 16:15) (92% - 96%)  Wt(kg): --  Daily Height in cm: 165.1 (17 Feb 2022 11:30)    Daily     I&O's Summary  PHYSICAL EXAM:  GENERAL: NAD, lying in bed comfortably, cachetic   HEAD:  Atraumatic, Normocephalic  EYES: EOMI, PERRLA, conjunctiva and sclera clear  NECK: Supple, No JVD  CHEST/LUNG: Clear to auscultation bilaterally; No rales, rhonchi, wheezing, or rubs. Unlabored respirations  HEART: Regular rate and rhythm; No murmurs, rubs, or gallops  ABDOMEN: Bowel sounds present; Soft, Nontender, + distended.   EXTREMITIES:  2+ Peripheral Pulses, brisk capillary refill. No clubbing, cyanosis, or edema  NERVOUS SYSTEM:  Alert & Oriented X3, speech clear. No deficits   MSK: FROM all 4 extremities, full and equal strength  SKIN: No rashes or lesions

## 2022-02-17 NOTE — ED PROVIDER NOTE - OBJECTIVE STATEMENT
61 year old female, past medical history pancreatic ca with mets on chemo, follows w/ dr powers, portal vein thrombosis, ascites s/p paracentesis 12/2021, who presents with generalized weakness/nausea. patient with decrease po intake x5 days with generalized weakness, patient was sitting on toilet when she felt too weak to stand, falling off of toilet sliding onto ground, did not hit head no loc. patient also endorses gradual worsening of abd pain/distention. denies fever, chills, hemoptysis, shortness of breath, chest pain, syncope. patient last chemo x2 weeks ago.

## 2022-02-17 NOTE — ED PROVIDER NOTE - NS ED ROS FT
Review of Systems:  	•	CONSTITUTIONAL: no fever, no diaphoresis, no chills  	•	SKIN: no rash   	•	ENT: no sore throat  	•	RESPIRATORY: no shortness of breath, no cough  	•	CARDIAC: no chest pain, no palpitations  	•	GI: +abd pain, +nausea, +vomiting. no diarrhea  	•	GENITO-URINARY: no discharge, no dysuria; no hematuria, no increased urinary frequency  	•	MUSCULOSKELETAL: no joint paint, no swelling, no redness  	•	NEUROLOGIC: +weakness, no loc, no head injury    	•	PSYCH: no anxiety, non suicidal, non homicidal, no hallucination, no depression

## 2022-02-17 NOTE — ED ADULT TRIAGE NOTE - CHIEF COMPLAINT QUOTE
Patient complaining of weakness and lethargy x4 days- as per patient she had a fall this morning where she lowered herself to the ground, did not hit her head or lose consciousness. Patient also notes abdominal swelling over the past few weeks.

## 2022-02-17 NOTE — H&P ADULT - ASSESSMENT
62 yo female with PMH of metastatic pancreatic cancer on chemotherapy (Dx 7/2021, follows Dr. 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 The patient denies a fall ,hitting her head or LOC. She denies any pain anywhere. Reports that she has had generalized weakness for the last few days and has had decreased po intake. She denies headache, chest pain, sob, abd pain, urinary frequency, urgency, constipation or diarrhea. All ros negative except as above.  62 yo female with PMH of metastatic pancreatic cancer on chemotherapy (Dx 7/2021, follows Dr. 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 The patient denies a fall ,hitting her head or LOC. She denies any pain anywhere. Reports that she has had generalized weakness for the last few days and has had decreased po intake. She denies headache, chest pain, sob, abd pain, urinary frequency, urgency, constipation or diarrhea. All ros negative except as above.     # Deconditioning in setting of Metastatic pancreatic cancer on chemotherapy   # Splenic/portal vein thrombosis  # Anemia / Leukopenia   # Ascites   - CT a/p w IV con 11/29/2021: Portal venous structure evaluation limited. Rind of soft tissue surrounding proximal celiac artery as well as pancreatic ductal dilatation consistent with extension of neoplasm. Soft tissue density within CBD stent with associated mild to moderate intrahepatic biliary ductal dilatation.  - completed Foifininox which she was not able to tolerate well due to severe diarrhea, hypokalemia and weight loss  - on [place]-Abraxane per chart review  - hold chem DVT PPx given possible need for paracentesis and INR 2.67 (goal INR 2)  - keep active T&S  - on Lasix 40 mg at home , hold given systolic blood pressure 90 (baseline 110) , restart tomorrow am if blood pressure improves   - Heme/onc consult    # Hyponatremia  - Na 131 , possibly secondary to ascites  - send urine and serum osm , urine sodium       #DVT PPx: SCD , hold eliquis   #GI PPx: PPI  #DASH / TLC  #CHG BATH  #Activity: as tolerated, PT / OT when medically improved   #Dispo: acute  62 yo female with PMH of metastatic pancreatic cancer on chemotherapy (Dx 7/2021, follows Dr. 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 The patient denies a fall ,hitting her head or LOC. She denies any pain anywhere. Reports that she has had generalized weakness for the last few days and has had decreased po intake. She denies headache, chest pain, sob, abd pain, urinary frequency, urgency, constipation or diarrhea. All ros negative except as above.     # Deconditioning in setting of Metastatic pancreatic cancer on chemotherapy   # Splenic/portal vein thrombosis  # Anemia / Leukopenia   # Ascites   - CT a/p w IV con 11/29/2021: Portal venous structure evaluation limited. Rind of soft tissue surrounding proximal celiac artery as well as pancreatic ductal dilatation consistent with extension of neoplasm. Soft tissue density within CBD stent with associated mild to moderate intrahepatic biliary ductal dilatation.  - completed Foifininox which she was not able to tolerate well due to severe diarrhea, hypokalemia and weight loss  - on [place]-Abraxane per chart review  - hold chem DVT PPx given possible need for paracentesis and INR 2.67 (goal INR < 2)  - keep active T&S  - on Lasix 40 mg at home , hold given systolic blood pressure 90 (baseline 110) , restart tomorrow am if blood pressure improves   - Heme/onc consult    # Hyponatremia  - Na 131 , possibly secondary to ascites  - send urine and serum osm , urine sodium       #DVT PPx: SCD , hold eliquis   #GI PPx: PPI  #DASH / TLC  #CHG BATH  #Activity: as tolerated, PT / OT when medically improved   #Dispo: acute  62 yo female with PMH of metastatic pancreatic cancer on chemotherapy (Dx 7/2021, follows Dr. 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 The patient denies a fall ,hitting her head or LOC. She denies any pain anywhere. Reports that she has had generalized weakness for the last few days and has had decreased po intake. She denies headache, chest pain, sob, abd pain, urinary frequency, urgency, constipation or diarrhea. All ros negative except as above.     # Deconditioning in setting of Metastatic pancreatic cancer on chemotherapy   # Splenic/portal vein thrombosis  # Anemia / Leukopenia   # Ascites   - CT a/p w IV con 11/29/2021: Portal venous structure evaluation limited. Rind of soft tissue surrounding proximal celiac artery as well as pancreatic ductal dilatation consistent with extension of neoplasm. Soft tissue density within CBD stent with associated mild to moderate intrahepatic biliary ductal dilatation.  - no abdominal tenderness on exam   - completed Foifininox which she was not able to tolerate well due to severe diarrhea, hypokalemia and weight loss  - on [place]-Abraxane per chart review  - hold chem DVT PPx given possible need for paracentesis and INR 2.67 (goal INR < 2)  - keep active T&S  - on Lasix 40 mg at home , hold given systolic blood pressure 90 (baseline 110) , restart tomorrow am if blood pressure improves   - Heme/onc consult    # SIRS , no source of infection  - meets sirs criteria given: hypothermia, hypotension, sinus tachycardia  - f/u urinalysis, urine cultures, blood cultures, procal  - CXR negative   - doubt SBP given no abdominal tenderness   - if urinalysis and urine cultures negative and patient's vitals not improved after IVF in ED, consider starting empiric abx for possible SBP , unable to perform paracenthesis given coagulopathy  - monitor       # Hyponatremia  - Na 131 , possibly secondary to ascites  - send urine and serum osm , urine sodium       #DVT PPx: SCD , hold eliquis   #GI PPx: PPI  #DASH / TLC  #CHG BATH  #Activity: as tolerated, PT / OT when medically improved   #Dispo: acute  62 yo female with PMH of metastatic pancreatic cancer on chemotherapy (Dx 7/2021, follows Dr. 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 The patient denies a fall ,hitting her head or LOC. She denies any pain anywhere. Reports that she has had generalized weakness for the last few days and has had decreased po intake. She denies headache, chest pain, sob, abd pain, urinary frequency, urgency, constipation or diarrhea. All ros negative except as above.     # Deconditioning in setting of Metastatic pancreatic cancer on chemotherapy   # Splenic/portal vein thrombosis  # Anemia / Leukopenia   # Ascites   - CT a/p w IV con 11/29/2021: Portal venous structure evaluation limited. Rind of soft tissue surrounding proximal celiac artery as well as pancreatic ductal dilatation consistent with extension of neoplasm. Soft tissue density within CBD stent with associated mild to moderate intrahepatic biliary ductal dilatation.  - no abdominal tenderness on exam   - completed Foifininox which she was not able to tolerate well due to severe diarrhea, hypokalemia and weight loss  - on [place]-Abraxane per chart review  - hold chem DVT PPx given possible need for paracentesis and INR 2.67 (goal INR < 2)  - keep active T&S  - on Lasix 40 mg at home , hold given systolic blood pressure 90 (baseline 110) , restart tomorrow am if blood pressure improves   - Heme/onc consult    # SIRS , no source of infection  # h/o ascites and paracenthesis in setting of malignancy  - meets sirs criteria given: hypothermia, hypotension, sinus tachycardia  - f/u urinalysis, urine cultures, blood cultures, procal  - CXR negative   - no abdominal tenderness / urinary symptoms/ respiratory symptoms, however given the patient's history of ascites in setting of malignancy and paracentesis in the past , will empirically start abx (unable to perform paracentesis at this time given INR 2.67)   -  Rocephin      # Hyponatremia  - Na 131 , possibly secondary to ascites  - send urine and serum osm , urine sodium       #DVT PPx: SCD , hold eliquis   #GI PPx: PPI  #DASH / TLC  #CHG BATH  #Activity: as tolerated, PT / OT when medically improved   #Dispo: acute

## 2022-02-17 NOTE — H&P ADULT - NSHPLABSRESULTS_GEN_ALL_CORE
10.4   2.99  )-----------( 228      ( 17 Feb 2022 12:56 )             32.7   02-17-22 @ 12:56    131<L>  |  96<L>  |  23<H>             --------------------------< 66<L>     4.3  |  22  | 0.9    eGFR AA: 81  eGFR N-AA: 70    Calcium: 8.4<L>  Phosphorus: --  Magnesium: 1.7<L>    AST: 16    ALT: 9  AlkPhos: 85  Protein: 4.7<L>  Albumin: 2.2<L>  TBili: 0.9  D-Bili: --

## 2022-02-18 NOTE — CHART NOTE - NSCHARTNOTEFT_GEN_A_CORE
Pt pain not being adequately managed. Pt takes oxy 10mg every 6 hrs at home, reports pain is usually managed at that dose. IV morphine 2mg did not improve the pain. Another 2 given, pt started on long acting oxy 10mg BID with 5mg for break through pain. Pain management consult placed. narcan ordered for bedside Pt pain not being adequately managed. Pt takes oxy 10mg every 6 hrs at home, reports pain is usually managed at that dose. IV morphine 2mg did not improve the pain. Another 2 given, pt started on long acting oxy 10mg BID with 5mg for break through pain. Pain management consult placed. narcan ordered for bedside    UPDATE: Morphine 4 mg q3h PRN for sever pain added as per Attending. Pt was nauseous Zofran PRN q6h added. Pt endorses difficulty swallowing, Speech and Swallow consulted.

## 2022-02-18 NOTE — CONSULT NOTE ADULT - ATTENDING SUPERVISION STATEMENT
Pt has appt scheduled for 5/22/19 at 10:20am but Dr. Haines not in clinic at that time. Called mother, no answer. Left message requesting mother to contact clinic to reschedule appt.   
Fellow
ACP

## 2022-02-18 NOTE — CONSULT NOTE ADULT - SUBJECTIVE AND OBJECTIVE BOX
BUSHRA MACDONALD          MRN-[id number]              HPI:  62 yo female with PMH of metastatic pancreatic cancer on chemotherapy (Dx 2021, follows Dr. 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 The patient denies a fall ,hitting her head or LOC. She denies any pain anywhere. Reports that she has had generalized weakness for the last few days and has had decreased po intake. She denies headache, chest pain, sob, abd pain, urinary frequency, urgency, constipation or diarrhea. All ros negative except as above.       T(C): 36.7 , HR: 122, BP: 83/56 , RR: 20 , SpO2: 92% on room air   Labs: Na 131, INR 2.67  CXR: unremarkable     (2022 16:33)      PAST MEDICAL & SURGICAL HISTORY:  HTN (hypertension)    Pancreatic cancer    Single delivery by  section        FAMILY HISTORY:   Reviewed and found non contributory in mother or father    SOCIAL HISTORY:   Tobacco/etoh/illicit drug use use reported. Yes [ ]  _________  No [ x]  Pt resides at: home [x ]  facility [ ]  other [ ] _______        ROS:	    Unable to attain due to:                      Dyspnea (Deb 0-10): 0                       N/V (Y/N): No                             Secretions (Y/N) : No                                          Agitation(Y/N): No                              Pain (Y/N): No                                 -Provocation/Palliation: N/A  -Quality/Quantity: N/A  -Radiating: N/A  -Severity: No pain  -Timing/Frequency: N/A  -Impact on ADLs: N/A    General:  Denied  HEENT:    Denied  Neck:  Denied  CVS:  Denied  Resp:  Denied  GI:  Denied    :  Denied  Musc:  Denied  Neuro:  Denied  Psych:  Denied  Skin:  Denied  Lymph:  Denied    Last BM:      Allergies    No Known Allergies    Intolerances      Opiate Naive (Y/N):   -iStop reviewed (Y/N):   Ref#:              This report was requested by: Brianna Carrero | Reference #: [id number]    Others' Prescriptions  Patient Name: Bushra MacdonaldBirth Date: 1960  Address: [address] [place] [id number]: Female  Rx Written	Rx Dispensed	Drug	Quantity	Days Supply	Prescriber Name	Prescriber Annette #	Payment Method	Dispenser  2022	oxycodone-acetaminophen  mg tab	30	7	Kristin Dunbar (NP)	[id number]	[facility]  2021	diphenoxylate-atropine 2.5-0.025 mg tablet	30	7	MonaKelton keith	[id number]	[facility]  2021	diphenoxylate-atropine 2.5-0.025 mg tablet	30	7	MinaKelton watts	[id number]	[facility]  2021	oxycodone-acetaminophen 5-325 mg tab	12	4	[facility]	[id number]	[facility]  * - Drugs marked with an asterisk are compound drugs. If the compound drug is made up of more than one controlled substance, then each controlled substance will be a separate row in the      Labs:	    CBC:                        9.5    13.00 )-----------( 230      ( 2022 07:00 )             30.4     CMP:        131<L>  |  96<L>  |  23<H>  ----------------------------<  66<L>  4.3   |  22  |  0.9    Ca    8.4<L>      2022 12:56  Mg     1.7         TPro  4.7<L>  /  Alb  2.2<L>  /  TBili  0.9  /  DBili  x   /  AST  16  /  ALT  9   /  AlkPhos  85  02-17       PT/INR - ( 2022 12:56 )   PT: 30.40 sec;   INR: 2.67 ratio         PTT - ( 2022 12:56 )  PTT:33.2 sec           Radiology:	       EK Lead ECG:   Ventricular Rate 126 BPM    Atrial Rate 126 BPM    P-R Interval 100 ms    QRS Duration 76 ms    Q-T Interval 296 ms    QTC Calculation(Bazett) 428 ms    P Axis 55 degrees    R Axis 15 degrees    T Axis 99 degrees    Diagnosis Line Sinus tachycardiawith short KS  Nonspecific T wave abnormality  Abnormal ECG    Confirmed by BILL SCHULTZ MD ([id number]) on 2022 2:54:35 PM (22 @ 13:50)      Imaging Personally Reviewed:  [ ] YES  [ ] NO    Consultant(s) Notes Reviewed:  [ ] YES  [ ] NO  Care Discussed with Consultants/Other Providers [ ] YES  [ ] NO    PEx:	  T(C): 36.2 (22 @ 05:12), Max: 36.7 (22 @ 16:15)  HR: 123 (22 @ 05:12) (99 - 123)  BP: 90/55 (22 @ 05:12) (83/56 - 95/60)  RR: 18 (22 @ 05:12) (18 - 20)  SpO2: 98% (22 @ 20:11) (92% - 98%)  Wt(kg): --  Daily Height in cm: 165.1 (2022 11:30)    Daily     General:  found in bed in NAD  Eyes:  PERRL EOMI Non icteric MOM  ENMT: no external oral ulcers, MMM, no thrush   CVS: RR S1S2 No M/G/R  Resp: Unlabored Non tachypneic No increased WOB  GI:  Soft NT ND BS+  :  Voiding / Huffman / PrimaFit  Musc: No C/C/E    Neuro: Follows commands No focal deficits  Psych: Calm Pleasant, AAOx3    Skin: Non jaundiced , no rash   Lymph: no adenopathy     Preadmit Karnofsky:  %           Current Karnofsky:     %  http://www.npcrc.org/files/news/karnofsky_performance_scale.pdf   http://www.npcrc.org/files/news/palliative_performance_scale_PPSv2.pdf  Cachexia (Y/N):   BMI:      Medications:	      MEDICATIONS  (STANDING):  cefTRIAXone   IVPB      cefTRIAXone   IVPB 2000 milliGRAM(s) IV Intermittent every 24 hours  chlorhexidine 4% Liquid 1 Application(s) Topical <User Schedule>  influenza   Vaccine 0.5 milliLiter(s) IntraMuscular once  multivitamin 1 Tablet(s) Oral daily  oxyCODONE  ER Tablet 10 milliGRAM(s) Oral every 12 hours  pancrelipase  (CREON 36,000 Lipase Units) 1 Capsule(s) Oral three times a day with meals  pantoprazole    Tablet 40 milliGRAM(s) Oral before breakfast  phytonadione  IVPB 5 milliGRAM(s) IV Intermittent once    MEDICATIONS  (PRN):  morphine  - Injectable 4 milliGRAM(s) IV Push every 3 hours PRN Severe Pain (7 - 10)  ondansetron Injectable 4 milliGRAM(s) IV Push every 6 hours PRN Nausea and/or Vomiting        Advanced Directives:	     Full Code      Decision maker: The patient is able to participate in complex medical decision making conversations.   Legal surrogate: spouse Gab Macdonald     GOALS OF CARE DISCUSSION	       Palliative care info/counseling provided	           Family meeting scheduled         Documentation of GOC/Advanced Care Planning:       See previous Palliative Medicine Note    PSYCHOSOCIAL-SPIRITUAL ASSESSMENT:       Reviewed       See Palliative Care SW/ documentation        	    REFERRALS       Palliative Med        Unit SW/Case Mgmt              Hospice       Speech/Swallow       Nutrition       PT/OT BUSHRA MACDONALD          MRN-[id number]              HPI:  62 yo female with PMH of metastatic pancreatic cancer on chemotherapy (Dx 2021, follows Dr. 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 The patient denies a fall ,hitting her head or LOC. She denies any pain anywhere. Reports that she has had generalized weakness for the last few days and has had decreased po intake. She denies headache, chest pain, sob, abd pain, urinary frequency, urgency, constipation or diarrhea. All ros negative except as above.       T(C): 36.7 , HR: 122, BP: 83/56 , RR: 20 , SpO2: 92% on room air   Labs: Na 131, INR 2.67  CXR: unremarkable     (2022 16:33)      PAST MEDICAL & SURGICAL HISTORY:  HTN (hypertension)    Pancreatic cancer    Single delivery by  section        FAMILY HISTORY:   Reviewed and found non contributory in mother or father    SOCIAL HISTORY:   Tobacco/etoh/illicit drug use use reported. Yes [ ]  _________  No [ x]  Pt resides at: home [x ]  facility [ ]  other [ ] _______        ROS:	    Unable to attain due to:  lethargy/advanced                     Dyspnea (Deb 0-10): 0                       N/V (Y/N): No                             Secretions (Y/N) : No                                          Agitation(Y/N): No                              Pain (Y/N): No                                 -Provocation/Palliation: N/A  -Quality/Quantity: N/A  -Radiating: N/A  -Severity: No pain  -Timing/Frequency: N/A  -Impact on ADLs: N/A    General:  Denied  HEENT:    Denied  Neck:  Denied  CVS:  Denied  Resp:  Denied  GI:  Denied    :  Denied  Musc:  Denied  Neuro:  Denied  Psych:  Denied  Skin:  Denied  Lymph:  Denied    Last BM:       Allergies    No Known Allergies    Intolerances      Opiate Naive (Y/N):   -iStop reviewed (Y/N):   Ref#:              This report was requested by: Brianna Carrero | Reference #: [id number]    Others' Prescriptions  Patient Name: Bushra MacdonaldBirth Date: 1960  Address: [address] [place] [id number]: Female  Rx Written	Rx Dispensed	Drug	Quantity	Days Supply	Prescriber Name	Prescriber Annette #	Payment Method	Dispenser  2022	oxycodone-acetaminophen  mg tab	30	7	Kristin Dunbar (NP)	[id number]	[facility]  2021	diphenoxylate-atropine 2.5-0.025 mg tablet	30	7	Kelton Bradley	[id number]	[facility]  2021	diphenoxylate-atropine 2.5-0.025 mg tablet	30	7	Kelton Bradley	[id number]	[facility]  2021	oxycodone-acetaminophen 5-325 mg tab	12	4	[facility]	[id number]	[facility]  * - Drugs marked with an asterisk are compound drugs. If the compound drug is made up of more than one controlled substance, then each controlled substance will be a separate row in the      Labs:	    CBC:                        9.5    13.00 )-----------( 230      ( 2022 07:00 )             30.4     CMP:        131<L>  |  96<L>  |  23<H>  ----------------------------<  66<L>  4.3   |  22  |  0.9    Ca    8.4<L>      2022 12:56  Mg     1.7         TPro  4.7<L>  /  Alb  2.2<L>  /  TBili  0.9  /  DBili  x   /  AST  16  /  ALT  9   /  AlkPhos  85  17       PT/INR - ( 2022 12:56 )   PT: 30.40 sec;   INR: 2.67 ratio         PTT - ( 2022 12:56 )  PTT:33.2 sec           Radiology:	       EK Lead ECG:   Ventricular Rate 126 BPM    Atrial Rate 126 BPM    P-R Interval 100 ms    QRS Duration 76 ms    Q-T Interval 296 ms    QTC Calculation(Bazett) 428 ms    P Axis 55 degrees    R Axis 15 degrees    T Axis 99 degrees    Diagnosis Line Sinus tachycardiawith short [place]  Nonspecific T wave abnormality  Abnormal ECG    Confirmed by BILL SCHULTZ MD ([id number]) on 2022 2:54:35 PM (22 @ 13:50)      Imaging Personally Reviewed:  [ ] YES  [ ] NO    Consultant(s) Notes Reviewed:  [ ] YES  [ ] NO  Care Discussed with Consultants/Other Providers [ ] YES  [ ] NO    PEx:	  T(C): 36.2 (22 @ 05:12), Max: 36.7 (22 @ 16:15)  HR: 123 (22 @ 05:12) (99 - 123)  BP: 90/55 (22 @ 05:12) (83/56 - 95/60)  RR: 18 (22 @ 05:12) (18 - 20)  SpO2: 98% (22 @ 20:11) (92% - 98%)  Wt(kg): --  Daily Height in cm: 165.1 (2022 11:30)    Daily     General:  Pt appears cachectic and nearing end of life  Eyes:  PERRL EOMI Non icteric MOM  ENMT: no external oral ulcers, MMM, no thrush   CVS: RR S1S2 No M/G/R  Resp: RR 10 non labored   GI:  Soft NT ND BS+  :  Voiding  Musc: No C/C/E    Neuro: responds to pain and verbal stimuli  Psych: unable to assess   Skin: Non jaundiced , no rash   Lymph: no adenopathy     Preadmit Karnofsky:  %           Current Karnofsky:     %  http://www.npcrc.org/files/news/karnofsky_performance_scale.pdf   http://www.npcrc.org/files/news/palliative_performance_scale_PPSv2.pdf  Cachexia (Y/N): yes   BMI:      Medications:	      MEDICATIONS  (STANDING):  cefTRIAXone   IVPB      cefTRIAXone   IVPB 2000 milliGRAM(s) IV Intermittent every 24 hours  chlorhexidine 4% Liquid 1 Application(s) Topical <User Schedule>  influenza   Vaccine 0.5 milliLiter(s) IntraMuscular once  multivitamin 1 Tablet(s) Oral daily  oxyCODONE  ER Tablet 10 milliGRAM(s) Oral every 12 hours  pancrelipase  (CREON 36,000 Lipase Units) 1 Capsule(s) Oral three times a day with meals  pantoprazole    Tablet 40 milliGRAM(s) Oral before breakfast  phytonadione  IVPB 5 milliGRAM(s) IV Intermittent once    MEDICATIONS  (PRN):  morphine  - Injectable 4 milliGRAM(s) IV Push every 3 hours PRN Severe Pain (7 - 10)  ondansetron Injectable 4 milliGRAM(s) IV Push every 6 hours PRN Nausea and/or Vomiting        Advanced Directives:	     Full Code      Decision maker: The patient is able to participate in complex medical decision making conversations.   Legal surrogate: spouse Gab Macdonald     GOALS OF CARE DISCUSSION	       Palliative care info/counseling provided	           Family meeting scheduled         Documentation of GOC/Advanced Care Planning:       See previous Palliative Medicine Note    PSYCHOSOCIAL-SPIRITUAL ASSESSMENT:       Reviewed       See Palliative Care SW/ documentation        	    REFERRALS       Palliative Med        Unit SW/Case Mgmt              Hospice       Speech/Swallow       Nutrition       PT/OT BUSHRA MACDONALD          MRN-[id number]              HPI:  60 yo female with PMH of metastatic pancreatic cancer on chemotherapy (Dx 2021, follows Dr. 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 The patient denies a fall ,hitting her head or LOC. She denies any pain anywhere. Reports that she has had generalized weakness for the last few days and has had decreased po intake. She denies headache, chest pain, sob, abd pain, urinary frequency, urgency, constipation or diarrhea. All ros negative except as above.       T(C): 36.7 , HR: 122, BP: 83/56 , RR: 20 , SpO2: 92% on room air   Labs: Na 131, INR 2.67  CXR: unremarkable     (2022 16:33)      PAST MEDICAL & SURGICAL HISTORY:  HTN (hypertension)    Pancreatic cancer    Single delivery by  section        FAMILY HISTORY:   Reviewed and found non contributory in mother or father    SOCIAL HISTORY:   Tobacco/etoh/illicit drug use use reported. Yes [ ]  _________  No [ x]  Pt resides at: home [x ]  facility [ ]  other [ ] _______        ROS:	    Unable to attain due to:  lethargy/advanced                     Dyspnea (Deb 0-10): 0                       N/V (Y/N): No                             Secretions (Y/N) : No                                          Agitation(Y/N): No                              Pain (Y/N): No                                 -Provocation/Palliation: N/A  -Quality/Quantity: N/A  -Radiating: N/A  -Severity: No pain  -Timing/Frequency: N/A  -Impact on ADLs: N/A    General:  unable to assess   HEENT:  unable to assess   Neck:  unable to assess   CVS:  unable to assess   Resp: unable to assess   GI:  unable to assess   :  unable to assess   Musc:  unable to assess   Neuro:  unable to assess   Psych:  unable to assess   Skin:  unable to assess   Lymph:  unable to assess     Last BM: unknown       Allergies    No Known Allergies    Intolerances      Opiate Naive (Y/N): no  -iStop reviewed (Y/N): yes   Ref#:              This report was requested by: Brianna Carrero | Reference #: [id number]    Others' Prescriptions  Patient Name: Bushra MacdonaldBirth Date: 1960  Address: [address] [place], [place] [address], [place] [address]Cwz: Female  Rx Written	Rx Dispensed	Drug	Quantity	Days Supply	Prescriber Name	Prescriber Annette #	Payment Method	Dispenser  2022	oxycodone-acetaminophen  mg tab	30	7	Kristin Dunbar (NP)	[id number]	Insurance	[facility]  2021	diphenoxylate-atropine 2.5-0.025 mg tablet	30	7	[facility]	[id number]	Insurance	[facility]  2021	diphenoxylate-atropine 2.5-0.025 mg tablet	30	7	[facility]	[id number]	[facility]  2021	oxycodone-acetaminophen 5-325 mg tab	12	4	[facility]	[id number]	[facility]  * - Drugs marked with an asterisk are compound drugs. If the compound drug is made up of more than one controlled substance, then each controlled substance will be a separate row in the      Labs:	    CBC:                        9.5    13.00 )-----------( 230      ( 2022 07:00 )             30.4     CMP:        131<L>  |  96<L>  |  23<H>  ----------------------------<  66<L>  4.3   |  22  |  0.9    Ca    8.4<L>      2022 12:56  Mg     1.7         TPro  4.7<L>  /  Alb  2.2<L>  /  TBili  0.9  /  DBili  x   /  AST  16  /  ALT  9   /  AlkPhos  85         PT/INR - ( 2022 12:56 )   PT: 30.40 sec;   INR: 2.67 ratio         PTT - ( 2022 12:56 )  PTT:33.2 sec           Radiology:	       EK Lead ECG:   Ventricular Rate 126 BPM    Atrial Rate 126 BPM    P-R Interval 100 ms    QRS Duration 76 ms    Q-T Interval 296 ms    QTC Calculation(Bazett) 428 ms    P Axis 55 degrees    R Axis 15 degrees    T Axis 99 degrees    Diagnosis Line Sinus tachycardiawith short [place]  Nonspecific T wave abnormality  Abnormal ECG    Confirmed by BILL SCHULTZ MD ([id number]) on 2022 2:54:35 PM (22 @ 13:50)      Imaging Personally Reviewed:  [ x] YES  [ ] NO    Consultant(s) Notes Reviewed:  [ x] YES  [ ] NO  Care Discussed with Consultants/Other Providers [x ] YES  [ ] NO    PEx:	  T(C): 36.2 (22 @ 05:12), Max: 36.7 (22 @ 16:15)  HR: 123 (22 @ 05:12) (99 - 123)  BP: 90/55 (22 @ 05:12) (83/56 - 95/60)  RR: 18 (22 @ 05:12) (18 - 20)  SpO2: 98% (22 @ 20:11) (92% - 98%)  Wt(kg): --  Daily Height in cm: 165.1 (2022 11:30)    Daily     General:  Pt appears cachectic and nearing end of life  Eyes:  PERRL EOMI Non icteric MOM  ENMT: no external oral ulcers, MMM, no thrush   CVS: RR S1S2 No M/G/R  Resp: RR 10 non labored   GI:  Soft NT ND BS+  :  Voiding  Musc: No C/C/E    Neuro: responds to pain and verbal stimuli  Psych: unable to assess   Skin: some jaundiced , no rash   Lymph: no adenopathy     Preadmit Karnofsky:  %           Current Karnofsky:     %  http://www.npcrc.org/files/news/karnofsky_performance_scale.pdf   http://www.npcrc.org/files/news/palliative_performance_scale_PPSv2.pdf  Cachexia (Y/N): yes   BMI:      Medications:	      MEDICATIONS  (STANDING):  cefTRIAXone   IVPB      cefTRIAXone   IVPB 2000 milliGRAM(s) IV Intermittent every 24 hours  chlorhexidine 4% Liquid 1 Application(s) Topical <User Schedule>  influenza   Vaccine 0.5 milliLiter(s) IntraMuscular once  multivitamin 1 Tablet(s) Oral daily  oxyCODONE  ER Tablet 10 milliGRAM(s) Oral every 12 hours  pancrelipase  (CREON 36,000 Lipase Units) 1 Capsule(s) Oral three times a day with meals  pantoprazole    Tablet 40 milliGRAM(s) Oral before breakfast  phytonadione  IVPB 5 milliGRAM(s) IV Intermittent once    MEDICATIONS  (PRN):  morphine  - Injectable 4 milliGRAM(s) IV Push every 3 hours PRN Severe Pain (7 - 10)  ondansetron Injectable 4 milliGRAM(s) IV Push every 6 hours PRN Nausea and/or Vomiting        Advanced Directives:	     Full Code      Decision maker: The patient is able to participate in complex medical decision making conversations.   Legal surrogate: spouse Gab Macdonald     GOALS OF CARE DISCUSSION	       Palliative care info/counseling provided	           Family meeting scheduled         Documentation of GOC/Advanced Care Planning:       See previous Palliative Medicine Note    PSYCHOSOCIAL-SPIRITUAL ASSESSMENT:       Reviewed       See Palliative Care SW/ documentation        	    REFERRALS       Palliative Med        Unit SW/Case Mgmt              Hospice   BUSHRA MACDONALD          [id number]              HPI:  62 yo female with PMH of metastatic pancreatic cancer on chemotherapy (Dx 2021, follows Dr. 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 The patient denies a fall ,hitting her head or LOC. She denies any pain anywhere. Reports that she has had generalized weakness for the last few days and has had decreased po intake. She denies headache, chest pain, sob, abd pain, urinary frequency, urgency, constipation or diarrhea. All ros negative except as above.       T(C): 36.7 , HR: 122, BP: 83/56 , RR: 20 , SpO2: 92% on room air   Labs: Na 131, INR 2.67  CXR: unremarkable     (2022 16:33)    PAST MEDICAL & SURGICAL HISTORY:  HTN (hypertension)    Pancreatic cancer    Single delivery by  section        FAMILY HISTORY:  Unable to obtain from patient    SOCIAL HISTORY:   Tobacco/etoh/illicit drug use use reported. Yes [ ]  _________  No [ x]  Pt resides at: home [x ]  facility [ ]  other [ ] _______        ROS:	    Unable to attain due to:  lethargy/advanced                     Dyspnea (Deb 0-10): 0                       N/V (Y/N): No                             Secretions (Y/N) : No                                          Agitation(Y/N): No                              Pain (Y/N): No                                 -Provocation/Palliation: N/A  -Quality/Quantity: N/A  -Radiating: N/A  -Severity: No pain  -Timing/Frequency: N/A  -Impact on ADLs: N/A    General:  unable to assess   HEENT:  unable to assess   Neck:  unable to assess   CVS:  unable to assess   Resp: unable to assess   GI:  unable to assess   :  unable to assess   Musc:  unable to assess   Neuro:  unable to assess   Psych:  unable to assess   Skin:  unable to assess   Lymph:  unable to assess     Last BM: unknown       Allergies    No Known Allergies    Intolerances      Opiate Naive (Y/N): no  -iStop reviewed (Y/N): yes   Ref#:              This report was requested by: Brianna Carrero | Reference #: [id number]    Others' Prescriptions  Patient Name: Bushra MacdonaldBirth Date: 1960  Address: [address] [place], [place] [address]Puf: Female  Rx Written	Rx Dispensed	Drug	Quantity	Days Supply	Prescriber Name	Prescriber Annette #	Payment Method	Dispenser  2022	oxycodone-acetaminophen  mg tab	30	7	Kristin Dunbar (NP)	[id number]	Insurance	[facility]  2021	diphenoxylate-atropine 2.5-0.025 mg tablet	30	7	Kelton Bradley	[id number]	Insurance	[facility]  2021	diphenoxylate-atropine 2.5-0.025 mg tablet	30	7	[facility]	[id number]	[facility]  2021	oxycodone-acetaminophen 5-325 mg tab	12	4	[facility]	[id number]	[facility]  * - Drugs marked with an asterisk are compound drugs. If the compound drug is made up of more than one controlled substance, then each controlled substance will be a separate row in the      Labs:	    CBC:                        9.5    13.00 )-----------( 230      ( 2022 07:00 )             30.4     CMP:        131<L>  |  96<L>  |  23<H>  ----------------------------<  66<L>  4.3   |  22  |  0.9    Ca    8.4<L>      2022 12:56  Mg     1.7         TPro  4.7<L>  /  Alb  2.2<L>  /  TBili  0.9  /  DBili  x   /  AST  16  /  ALT  9   /  AlkPhos  85         PT/INR - ( 2022 12:56 )   PT: 30.40 sec;   INR: 2.67 ratio         PTT - ( 2022 12:56 )  PTT:33.2 sec           Radiology:	       EK Lead ECG:   Ventricular Rate 126 BPM    Atrial Rate 126 BPM    P-R Interval 100 ms    QRS Duration 76 ms    Q-T Interval 296 ms    QTC Calculation(Bazett) 428 ms    P Axis 55 degrees    R Axis 15 degrees    T Axis 99 degrees    Diagnosis Line Sinus tachycardiawith short LA  Nonspecific T wave abnormality  Abnormal ECG    Confirmed by BILL SCHULTZ MD ([id number]) on 2022 2:54:35 PM (22 @ 13:50)      Imaging Personally Reviewed:  [ x] YES  [ ] NO    Consultant(s) Notes Reviewed:  [ x] YES  [ ] NO  Care Discussed with Consultants/Other Providers [x ] YES  [ ] NO    PEx:	  T(C): 36.2 (22 @ 05:12), Max: 36.7 (22 @ 16:15)  HR: 123 (22 @ 05:12) (99 - 123)  BP: 90/55 (22 @ 05:12) (83/56 - 95/60)  RR: 18 (22 @ 05:12) (18 - 20)  SpO2: 98% (22 @ 20:11) (92% - 98%)  Wt(kg): --  Daily Height in cm: 165.1 (2022 11:30)    Daily     General:  Pt appears cachectic and nearing end of life  Eyes:  PERRL EOMI Non icteric MOM  ENMT: no external oral ulcers, MMM, no thrush   CVS: RR S1S2 No M/G/R  Resp: RR 10 non labored   GI:  Soft NT ND BS+  :  Voiding  Musc: No C/C/E    Neuro: responds to pain and verbal stimuli  Psych: unable to assess   Skin: some jaundiced , no rash   Lymph: no adenopathy     Preadmit Karnofsky:  %           Current Karnofsky:     %  http://www.npcrc.org/files/news/karnofsky_performance_scale.pdf   http://www.npcrc.org/files/news/palliative_performance_scale_PPSv2.pdf  Cachexia (Y/N): yes   BMI:      Medications:	      MEDICATIONS  (STANDING):  cefTRIAXone   IVPB      cefTRIAXone   IVPB 2000 milliGRAM(s) IV Intermittent every 24 hours  chlorhexidine 4% Liquid 1 Application(s) Topical <User Schedule>  influenza   Vaccine 0.5 milliLiter(s) IntraMuscular once  multivitamin 1 Tablet(s) Oral daily  oxyCODONE  ER Tablet 10 milliGRAM(s) Oral every 12 hours  pancrelipase  (CREON 36,000 Lipase Units) 1 Capsule(s) Oral three times a day with meals  pantoprazole    Tablet 40 milliGRAM(s) Oral before breakfast  phytonadione  IVPB 5 milliGRAM(s) IV Intermittent once    MEDICATIONS  (PRN):  morphine  - Injectable 4 milliGRAM(s) IV Push every 3 hours PRN Severe Pain (7 - 10)  ondansetron Injectable 4 milliGRAM(s) IV Push every 6 hours PRN Nausea and/or Vomiting        Advanced Directives:	     Full Code      Decision maker: The patient is able to participate in complex medical decision making conversations.   Legal surrogate: spouse Gab Macdonald     GOALS OF CARE DISCUSSION	       Palliative care info/counseling provided	           Family meeting scheduled         Documentation of GOC/Advanced Care Planning:       See previous Palliative Medicine Note    PSYCHOSOCIAL-SPIRITUAL ASSESSMENT:       Reviewed       See Palliative Care SW/ documentation        	    REFERRALS       Palliative Med        Unit SW/Case Mgmt              Hospice

## 2022-02-18 NOTE — CONSULT NOTE ADULT - ASSESSMENT
Patient is a 61 year old female with advanced pancreatic cancer complicated by obstructive jaundice, portal vein encasement and dilated CBD s/p metallic stent, splenomegaly and suspected varices. Patient was previously treated with 5 cycles of FOlFIRINOX. Regimen was poorly tolerated due to severe diarrhea, hypokalemia, and marked weight loss. Tried on single agent gem, then progressed and was started on [place]-Abraxane. She developed large volume ascites and anasarca secondary to portal vein thrombosis. Most recent imaging consistent with progression and new thrombosis of the portal vein, for which she was started on Eliquis Last imaging December 2021.     Metastatic pancreatic cancer: Initially treated with 5 doses of FOLFIRINOX, however changed to single agent gem as she was unable to tolerate. She had progression and was switched to gem/abraxane every 2 weeks, s/p 4 cycles. Last chemo was 2/7.   - Pt is appears very lethargic and frail, cachectic.   - Followup sepsis workup, if cultures negative and her condition is fully attributable to her cancer then would advise comfort measures only  - She is not a candidate for any cancer directed treatment at this time  - Palliative care followup   -  4600 (June 2021) --> 105(1/10) --> 124 (1/24)   - was planned for repeat imaging next week     Ascites/PVT/splenic vein thrombosis:  - Can consider para to r/o SBP as she does have some tenderness on exam  - Eliquis was held for possible paracentesis    SIRS:  - F/u bcx  - Can consider paracentesis, r/o SBP  - Continue with abx + IVF if family does not want CMO Patient is a 61 year old female with advanced pancreatic cancer complicated by obstructive jaundice, portal vein encasement and dilated CBD s/p metallic stent, splenomegaly and suspected varices. Patient was previously treated with 5 cycles of FOlFIRINOX. Regimen was poorly tolerated due to severe diarrhea, hypokalemia, and marked weight loss. Tried on single agent gemcitabine, then progressed and was started on [place]-Abraxane. She developed large volume ascites and anasarca secondary to portal vein thrombosis. Most recent imaging consistent with progression and new thrombosis of the portal vein, for which she was started on Eliquis. Last imaging December 2021.     Metastatic pancreatic cancer: Initially treated with 5 doses of FOLFIRINOX, however changed to single agent gem as she was unable to tolerate. She had progression and was switched to gem/abraxane every 2 weeks, s/p 4 cycles. Last chemo was 2/7.   - Pt appears very lethargic and frail, cachectic.   - Followup sepsis workup, if cultures negative and her condition is fully attributable to her cancer then would advise comfort measures only  - She is not a candidate for any cancer directed treatment at this time  - Palliative care followup   -  4600 (June 2021) --> 105(1/10) --> 124 (1/24)   - Was planned for repeat imaging next week     Ascites/PVT/splenic vein thrombosis:  - Can consider para to r/o SBP as she does have some tenderness on exam  - Eliquis was held for possible paracentesis    SIRS:  - F/u bcx  - Can consider paracentesis, r/o SBP  - Continue with abx + IVF if family does not want CMO

## 2022-02-18 NOTE — CHART NOTE - NSCHARTNOTEFT_GEN_A_CORE
I was notified by RN that pt desatted to low 70s on 5 L NC and hypotensive 70s/50s with MAP of 58. Pt was examined at bedside, respiratory contacted and pt was placed on NRB, saturation improved to 100%. Pt was on D5 and LR continuous IVF, after bolusing IVF pt improved with the MAP >60.   Pt's Spouse contacted, discussed the situation and her condition, and the possible requirement of Pressors. He admits that He will make decision abt CMO tomorrow and He wants to proceed with Medical managements for now until he discuss CMO with Palliative care team. I was notified by RN that pt desatted to low 70s on 5 L NC and hypotensive 70s/50s with MAP of 58. Pt was examined at bedside, pt was uncomfortable, tired and ill-appearing.  Pt was placed on NRB and Respiratory contacted and, saturation improved to 100%. Pt was on D5 and LR continuous IVF, after bolusing IVF bp improved 90/52 with the MAP >60.   Pt's Spouse contacted, discussed the situation and her condition, and the possible requirement of Pressors. He admits that He will make decision about CMO tomorrow and He wants to proceed with Medical managements for now until he discuss CMO with Palliative care team.  Will continue to monitor. I was notified by RN that pt desatted to low 70s on 5 L NC and hypotensive 70s/50s with MAP of 58. Pt was examined at bedside, pt was uncomfortable, tired and ill-appearing.  Pt was placed on NRB and Respiratory contacted and, saturation improved to 100%. Pt was on D5 and LR continuous IVF, after bolusing IVF bp improved 90/52 with the MAP >60. STAT EKG and CXR ordered.  Pt's Spouse contacted, discussed the situation and her condition, and the possible requirement of Pressors. He admits that He will make decision about CMO tomorrow and He wants to proceed with Medical managements for now until he discuss CMO with Palliative care team.  Will continue to monitor.    UPDATE: pt bp dropped to 68/51 MAP of 56 around 4:30 AM, pt started on Levophed. I was notified by RN that pt desatted to low 70s on 5 L NC and hypotensive 70s/50s with MAP of 58. Pt was examined at bedside, pt was uncomfortable, tired and ill-appearing.  Pt was placed on NRB and Respiratory contacted and, saturation improved to 100%. Pt was on D5 and LR continuous IVF, after bolusing IVF bp improved 90/52 with the MAP >60. STAT EKG and CXR ordered.  Pt's Spouse contacted, discussed the situation and her condition, and the possible requirement of Pressors. He admits that He will make decision about CMO tomorrow and He wants to proceed with Medical managements for now until he discuss CMO with Palliative care team.  Will continue to monitor.    UPDATE: pt bp dropped to 68/51 MAP of 56 around 4:30 AM, pt started on Levophed.  Please follow up Procal, Lactate and D-Dimer as pt has high risk for PE

## 2022-02-18 NOTE — CONSULT NOTE ADULT - CONVERSATION DETAILS
Palliative Care team met with spouse and son    Reviewed current condition and treatment. Reviewed overall grave prognosis as described by oncology team today. Explained advance directives DNR and DNI. The spouse was able to verbalize understanding.     Both spouse and son report being in shock with the news that her condition is so advanced. Support provided. Reviewed of illness done. Discussed how patient has been declining over the last 6 months.     Also reviewed option of comfort care and hospice.  said he would not be able to take his spouse home. He needs time to consider. Palliative Care team met with spouse and son    Reviewed current condition and treatment. Reviewed overall grave prognosis as described by oncology team today. Explained advance directives DNR and DNI. The spouse was able to verbalize understanding.     Both spouse and son report being in shock with the news that her condition is so advanced. Support provided. Review of illness done. Discussed how patient has been declining over the last 6 months.     Also reviewed option of comfort care and hospice.  said he would not be able to take his spouse home. He needs time to consider.

## 2022-02-18 NOTE — CHART NOTE - NSCHARTNOTEFT_GEN_A_CORE
PALLIATIVE MEDICINE INTERDISCIPLINARY TEAM NOTE    Provider:  [ X  ]Social Work   [   ]          [ X  ] Initial visit [   ] Follow up    Family or contact name / phone #   Met with: [   ] Patient  [ X  ] Family  [   ] Other:    Primary Language: [ X  ] English [   ] Other*:                      *Interpretation provided by:    SUPPORT DIAGNOSES            (Check all that apply)  [ X  ] Psychosocial spiritual assessment (PSSA)  [  X ] EOL issues  [   ] Cultural / spiritual concerns  [ X  ] Pain / suffering  [   ] Dementia / AMS  [   ] Other:  [   ] AD issues  [   ] Grief / loss / sadness  [   ] Discharge issues  [ X  ] Distress / coping    PSYCHOSOCIAL ASSESSMENT OF PATIENT         (Check all that apply)  [  X ] Initial Assessment            [   ] Reassessment          [   ] Not Applicable this visit    Pain/suffering acuity:  [ X  ] None to mild (0-3)           [   ] Moderate (4-6)        [   ] High (7-10)    Mental Status:  [   ] Alert/oriented (x3)          [ X  ] Confused/Altered(x2/x1)         [   ] Non-resp    Functional status:  [   ] Independent w ADLs      [ X  ] Needs Assistance             [   ] Bedbound/Full Care    Coping:  [   ] Coping well                     [  X ] Coping w/difficulty            [   ] Poor coping    Support system:  [ X  ] Strong                              [   ] Adequate                        [   ] Inadequate      Past history and medications for:     [ ] Anxiety       [ ] Depression    [ ] Sleep disorders     SPIRITUAL ASSESSMENT  Islam/Spiritual practice: ___________________________    Role of organized Jainism:  [   ] Important                     [   ] Some (fam tradition, cultural)               [   ] None    Effects on medical care:  [   ] Yes, _____________________________________                         [   ] None    Cultural/Religion need:  [   ] Yes, _____________________________________                         [   ] None    Refer to Pastoral Care:  [   ] Yes           [   ] No, not at this time    SERVICE PROVIDED  [   ]PSSA                                                                             [   ]Discharge support / facilitation  [ X  ]AD / goals of care counseling                                  [ X  ]EOL / death / bereavement counseling  [  X ]Counseling / support                                                [   ] Family meeting  [   ]Prayer / sacrament / ritual                                      [   ] Referral   [   ]Other                                                                       NOTE and Plan of Care (PoC):    patient is a 61 y.o with pmhx., metastatic pancreatic cancer,  presenting s/p fall at home. Palliative Care SW and NP visited patient earlier today. patient's son Griffin and  Gab were at bedside. Role of palliative care introduced. family received update from oncology team today. discussed with family overall poor prognosis. discussed with family advanced directives and options including DNR and DNI in detail. Gab and ángel verbalized being in shock and explained that destinynet had functional decline in the last couple of weeks leading up to this hospitalization. Gab stated patient's sister will be visiting today, and he will discuss with her and rest of family about DNR/DNI. emotional support rendered. will continue to follow. [phone]

## 2022-02-18 NOTE — SWALLOW BEDSIDE ASSESSMENT ADULT - SLP PERTINENT HISTORY OF CURRENT PROBLEM
pt is a 62 y/o F w/ PMHx: metastatic pancreatic ca on chemotx (Dx 7/2021, follows Dr. Hayward) c/b splenomegaly/ascites, HTN presents to the hospital after sliding off the commode due to difficulty getting up/generalized weakness. Pt is being treated for deconditioning in the setting of metastatic pancreatic ca; +hypoalbuminemia, malnutrition, hypoglycemia

## 2022-02-18 NOTE — PATIENT PROFILE ADULT - FALL HARM RISK - HARM RISK INTERVENTIONS

## 2022-02-18 NOTE — CONSULT NOTE ADULT - PROBLEM SELECTOR RECOMMENDATION 9
Full code  GOC conversation above.   Considering DNR/DNI and comfort care D/C all previous medications  - Dilaudid 0.5mg IV q 2 hrs PRN for pain 4-10

## 2022-02-18 NOTE — PROGRESS NOTE ADULT - SUBJECTIVE AND OBJECTIVE BOX
BUSHRA CAST 61y Female  MRN#: [id number]   CODE STATUS: full code     Hospital Day: 1d    Pt is currently admitted with the primary diagnosis of generalized weakness     SUBJECTIVE    no acute events overnight, pt seen and examined,   denies fever chills, nvd, no urinary symptoms no cp, sob,                                             ----------------------------------------------------------  OBJECTIVE  PAST MEDICAL & SURGICAL HISTORY  HTN (hypertension)    Pancreatic cancer    Single delivery by  section                                              -----------------------------------------------------------  ALLERGIES:  No Known Allergies                                            ------------------------------------------------------------    HOME MEDICATIONS  Home Medications:  ONDANSETRON HCL 8 MG TABLET:  (2022 17:37)                           MEDICATIONS:  STANDING MEDICATIONS  cefTRIAXone   IVPB      cefTRIAXone   IVPB 2000 milliGRAM(s) IV Intermittent every 24 hours  chlorhexidine 4% Liquid 1 Application(s) Topical <User Schedule>  influenza   Vaccine 0.5 milliLiter(s) IntraMuscular once  multivitamin 1 Tablet(s) Oral daily  oxyCODONE  ER Tablet 10 milliGRAM(s) Oral every 12 hours  pancrelipase  (CREON 36,000 Lipase Units) 1 Capsule(s) Oral three times a day with meals  pantoprazole    Tablet 40 milliGRAM(s) Oral before breakfast  phytonadione  IVPB 5 milliGRAM(s) IV Intermittent once    PRN MEDICATIONS  morphine  - Injectable 4 milliGRAM(s) IV Push every 3 hours PRN  ondansetron Injectable 4 milliGRAM(s) IV Push every 6 hours PRN                                            ------------------------------------------------------------  VITAL SIGNS: Last 24 Hours  T(C): 36.2 (2022 05:12), Max: 36.7 (2022 16:15)  T(F): 97.2 (2022 05:12), Max: 98.1 (2022 16:15)  HR: 123 (2022 05:12) (99 - 123)  BP: 90/50 (2022 09:33) (83/56 - 95/60)  BP(mean): 71 (2022 21:00) (71 - 71)  RR: 18 (2022 05:12) (18 - 20)  SpO2: 98% (2022 20:11) (92% - 98%)                                             --------------------------------------------------------------  LABS:                        9.5    13.00 )-----------( 230      ( 2022 07:00 )             30.4     02-18    136  |  100  |  27<H>  ----------------------------<  10<LL>  5.8<H>   |  15<L>  |  1.6<H>    Ca    8.1<L>      2022 07:00  Mg     2.3     18    TPro  4.4<L>  /  Alb  1.9<L>  /  TBili  0.7  /  DBili  x   /  AST  29  /  ALT  11  /  AlkPhos  86  18    PT/INR - ( 2022 12:56 )   PT: 30.40 sec;   INR: 2.67 ratio         PTT - ( 2022 12:56 )  PTT:33.2 sec                                                          -------------------------------------------------------------  RADIOLOGY:    ACC: [id number] EXAM:  XR CHEST PORTABLE URGENT 1V                          PROCEDURE DATE:  2022          INTERPRETATION:  Clinical History / Reason for exam: Weakness.    Comparison : Chest radiograph None.    Technique/Positioning: Single frontal view the chest.    Findings:    Support devices: Right IJ Port-A-Cath with tip projecting over the   cavoatrial junction. It has been needle access.    Cardiac/mediastinum/hilum: Unremarkable.    Lung parenchyma/Pleura: Low lung volumes. No acute consolidation. No   pneumothorax.    Skeleton/soft tissues: Mild degenerative changes. Biliary stent noted in   the upper abdomen    Impression:    Low lung volumes. No acute consolidation.                                             --------------------------------------------------------------    PHYSICAL EXAM:  General: cachetic, ill appearing   LUNGS: air entry bilat, decreased lower lung bases   HEART: RRR, +S1,S2,  ABDOMEN: distended, tense, dull to percussion, tender to palpation   EXT: Warm, well perfused x 4, no edema   NEURO: AxOx3, No FND's noted  SKIN: No new breakdown or rashes noted                                           --------------------------------------------------------------   BUSHRA CAST 61y Female  MRN#: [id number]   CODE STATUS: full code     Hospital Day: 1d    Pt is currently admitted with the primary diagnosis of generalized weakness     SUBJECTIVE    no acute events overnight, pt seen and examined,   denies fever chills, nvd, no urinary symptoms no cp, sob,                                             ----------------------------------------------------------  OBJECTIVE  PAST MEDICAL & SURGICAL HISTORY  HTN (hypertension)    Pancreatic cancer    Single delivery by  section                                              -----------------------------------------------------------  ALLERGIES:  No Known Allergies                                            ------------------------------------------------------------    HOME MEDICATIONS  Home Medications:  ONDANSETRON HCL 8 MG TABLET:  (2022 17:37)                           MEDICATIONS:  STANDING MEDICATIONS  cefTRIAXone   IVPB      cefTRIAXone   IVPB 2000 milliGRAM(s) IV Intermittent every 24 hours  chlorhexidine 4% Liquid 1 Application(s) Topical <User Schedule>  influenza   Vaccine 0.5 milliLiter(s) IntraMuscular once  multivitamin 1 Tablet(s) Oral daily  oxyCODONE  ER Tablet 10 milliGRAM(s) Oral every 12 hours  pancrelipase  (CREON 36,000 Lipase Units) 1 Capsule(s) Oral three times a day with meals  pantoprazole    Tablet 40 milliGRAM(s) Oral before breakfast  phytonadione  IVPB 5 milliGRAM(s) IV Intermittent once    PRN MEDICATIONS  morphine  - Injectable 4 milliGRAM(s) IV Push every 3 hours PRN  ondansetron Injectable 4 milliGRAM(s) IV Push every 6 hours PRN                                            ------------------------------------------------------------  VITAL SIGNS: Last 24 Hours  T(C): 36.2 (2022 05:12), Max: 36.7 (2022 16:15)  T(F): 97.2 (2022 05:12), Max: 98.1 (2022 16:15)  HR: 123 (2022 05:12) (99 - 123)  BP: 90/50 (2022 09:33) (83/56 - 95/60)  BP(mean): 71 (2022 21:00) (71 - 71)  RR: 18 (2022 05:12) (18 - 20)  SpO2: 98% (2022 20:11) (92% - 98%)                                             --------------------------------------------------------------  LABS:                        9.5    13.00 )-----------( 230      ( 2022 07:00 )             30.4     02-18    136  |  100  |  27<H>  ----------------------------<  10<LL>  5.8<H>   |  15<L>  |  1.6<H>    Ca    8.1<L>      2022 07:00  Mg     2.3     18    TPro  4.4<L>  /  Alb  1.9<L>  /  TBili  0.7  /  DBili  x   /  AST  29  /  ALT  11  /  AlkPhos  86  18    PT/INR - ( 2022 12:56 )   PT: 30.40 sec;   INR: 2.67 ratio         PTT - ( 2022 12:56 )  PTT:33.2 sec                                                          -------------------------------------------------------------  RADIOLOGY:    ACC: [id number] EXAM:  XR CHEST PORTABLE URGENT 1V                          PROCEDURE DATE:  2022          INTERPRETATION:  Clinical History / Reason for exam: Weakness.    Comparison : Chest radiograph None.    Technique/Positioning: Single frontal view the chest.    Findings:    Support devices: Right IJ Port-A-Cath with tip projecting over the   cavoatrial junction. It has been needle access.    Cardiac/mediastinum/hilum: Unremarkable.    Lung parenchyma/Pleura: Low lung volumes. No acute consolidation. No   pneumothorax.    Skeleton/soft tissues: Mild degenerative changes. Biliary stent noted in   the upper abdomen    Impression:    Low lung volumes. No acute consolidation.                                             --------------------------------------------------------------    PHYSICAL EXAM:  General: cachetic, ill appearing   LUNGS: air entry bilat, decreased lower lung bases   HEART: RRR, +S1,S2,  ABDOMEN: distended, tense, dull to percussion, tender to palpation   EXT: Warm, well perfused x 4, no edema   NEURO: AxOx3, No FND's noted  SKIN: No new breakdown or rashes noted                                           --------------------------------------------------------------

## 2022-02-18 NOTE — PROGRESS NOTE ADULT - ASSESSMENT
60 yo female with PMH of metastatic pancreatic cancer on chemotherapy (Dx 7/2021, follows Dr. 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    # Deconditioning in setting of Metastatic pancreatic cancer on chemotherapy   # HO Splenic/portal vein thrombosis  # Anemia   # Ascites   - CT a/p w IV con 11/29/2021: Portal venous structure evaluation limited. Rind of soft tissue surrounding proximal celiac artery as well as pancreatic ductal dilatation consistent with extension of neoplasm. Soft tissue density within CBD stent with associated mild to moderate intrahepatic biliary ductal dilatation.  - moderate abdominal tenderness on exam   - completed Foifininox which she was not able to tolerate well due to severe diarrhea, hypokalemia and weight loss  - on [place] [employer]-Abraxane per chart review  - hold chem DVT PPx given possible need for paracentesis and INR 2.67 (goal INR < 2)  - keep active T&S  - on Lasix 40 mg at home, holding given hypotension   - Heme/onc consult    # SIRS , no source of infection  # h/o ascites and paracenthesis in setting of malignancy  - meets sirs criteria given: hypothermia, hypotension, sinus tachycardia  - f/u urinalysis, urine cultures, blood cultures, procal  - CXR negative   - no abdominal tenderness / urinary symptoms/ respiratory symptoms, however given the patient's history of ascites in setting of malignancy and paracentesis in the past , will empirically start abx (unable to perform paracentesis at this time given INR 2.67)   -  Rocephin      # Hyponatremia  - Na 131 , possibly secondary to ascites  - send urine and serum osm , urine sodium       #DVT PPx: SCD , hold eliquis   #GI PPx: PPI  #DASH / TLC  #CHG BATH  #Activity: as tolerated, PT / OT when medically improved   #Dispo: acute      60 yo female with PMH of metastatic pancreatic cancer on chemotherapy (Dx 7/2021, follows Dr. 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    # Deconditioning in setting of Metastatic pancreatic cancer on chemotherapy   # HO Splenic/portal vein thrombosis  # malignant Ascites r/o SBP   # anemia   - CT a/p w IV con 11/29/2021: Portal venous structure evaluation limited. Rind of soft tissue surrounding proximal celiac artery as well as pancreatic ductal dilatation consistent with extension of neoplasm. Soft tissue density within CBD stent with associated mild to moderate intrahepatic biliary ductal dilatation.  - moderate abdominal tenderness on exam   - completed Foifininox which she was not able to tolerate well due to severe diarrhea, hypokalemia and weight loss on [place]-Abraxane per chart review  - heme onc evaluation   - palliative care consult   - will require paracentesis, US abdomen, hold eliquis given possible need for paracentesis and INR 2.67 (goal INR < 2), vitamin K 5mg IV once,  - keep active T&S  - on Lasix 40 mg at home, holding given hypotension   - continue rocephin 2g qd for now,     # hypovolemic Hyponatremia   - Na 131 on admission improved with fluids    - f/u urine and serum osm , urine sodium       #DVT PPx: SCD , hold eliquis   #GI PPx: PPI  #DASH / TLC  #CHG BATH  #Activity: as tolerated, PT / OT when medically improved   #Dispo: acute      60 yo female with PMH of metastatic pancreatic cancer on chemotherapy (Dx 7/2021, follows Dr. 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    # Deconditioning in setting of Metastatic pancreatic cancer on chemotherapy   # HO Splenic/portal vein thrombosis  # malignant Ascites r/o SBP   # anemia   # SIRS on admission   - CT a/p w IV con 11/29/2021: Portal venous structure evaluation limited. Rind of soft tissue surrounding proximal celiac artery as well as pancreatic ductal dilatation consistent with extension of neoplasm. Soft tissue density within CBD stent with associated mild to moderate intrahepatic biliary ductal dilatation.  - moderate abdominal tenderness on exam   - completed Foifininox which she was not able to tolerate well due to severe diarrhea, hypokalemia and weight loss on [place]-Abraxane per chart review  - heme onc evaluation   - palliative care consult   - will require paracentesis, US abdomen, hold eliquis given possible need for paracentesis and INR 2.67 (goal INR < 2), vitamin K 5mg IV once,  - keep active T&S  - on Lasix 40 mg at home, holding given hypotension   - continue rocephin 2g qd for now,     # hypovolemic Hyponatremia   - Na 131 on admission improved with fluids    - f/u urine and serum osm , urine sodium     #malnutrition   #hypoalbuminemia         #DVT PPx: SCD , hold eliquis   #GI PPx: PPI  #DASH / TLC  #CHG BATH  #Activity: as tolerated, PT / OT when medically improved   #Dispo: acute      62 yo female with PMH of metastatic pancreatic cancer on chemotherapy (Dx 7/2021, follows Dr. 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    # Deconditioning in setting of Metastatic pancreatic cancer on chemotherapy   # HO Splenic/portal vein thrombosis  # malignant Ascites r/o SBP   # anemia   # SIRS on admission   - CT a/p w IV con 11/29/2021: Portal venous structure evaluation limited. Rind of soft tissue surrounding proximal celiac artery as well as pancreatic ductal dilatation consistent with extension of neoplasm. Soft tissue density within CBD stent with associated mild to moderate intrahepatic biliary ductal dilatation.  - moderate abdominal tenderness on exam   - completed Foifininox which she was not able to tolerate well due to severe diarrhea, hypokalemia and weight loss on [place]-Abraxane per chart review  - on Lasix 40 mg at home, holding given hypotension   - heme onc evaluation   - palliative care consult   - will require paracentesis, US abdomen, hold eliquis given possible need for paracentesis and INR 2.67 (goal INR < 2), vitamin K 5mg IV once,  - keep active T&S  - continue rocephin 2g qd for now,     # hypovolemic Hyponatremia   - Na 131 on admission improved with fluids    - f/u urine and serum osm , urine sodium     #malnutrition   #hypoalbuminemia   #hypoglycemia   - speech and swallow appreciated, soft   - dietician eval   - d10 at 75ml/hr     #DVT PPx: SCD , hold eliquis   #GI PPx: PPI  #diet- soft and bite sized   #Activity: as tolerated, PT / OT when medically improved     Pending - US abdomen, Para after reversal of INR, heme onc fu, palliative fu

## 2022-02-18 NOTE — CONSULT NOTE ADULT - ASSESSMENT
61yFemale being evaluated for goals of care and symptom management. Pt came in with difficulty walking r/t increased pain. Pt started on Oxycodone 10mg percocets on 2/14/22 q 6 hrs. This did not relieve her pain.       MEDD (morphine equivalent daily dose): 60mg/24hrs       See Recs below.    Please call [phone] with questions or concerns 24/7.   We will continue to follow.    61yFemale being evaluated for goals of care and symptom management. Pt came in with difficulty walking r/t increased pain. Pt started on Oxycodone 10mg percocets on 2/14/22 q 6 hrs. This did not relieve her pain.     When patient seen for consult she is lethargic. She is arousible but only momentarily and the  she goes back to sleep. Pt s/p episode of hypoglycemia despite not getting any insulin. Pt did respond to D 50, but is not on IVF due to ascites. She is pending a paracentesis. She is being treated for infection ? source.     Palliative team present when heme/onc team met with  and son and explained pt's grave prognosis. Palliative team met with them afterwards and presented options - see GOC note. Case reviewed at length w he,e/onc fellow and attending MD Sky. Plan is primary team will follow up for further discussion of DNR/DNI. Pt's sister coming to see her.     Pt lethargic does wake up and moan, but does not appear to have pain. She has a RR 10 and has gotten a large dose of opioids in last 24hrs. Would recommend to steam line medications and avoid PO.       MEDD (morphine equivalent daily dose): 91.5mg/24hrs       See Recs below.    Please call [phone] with questions or concerns 24/7.   We will continue to follow.

## 2022-02-18 NOTE — CONSULT NOTE ADULT - ATTENDING COMMENTS
Patient was also seen by myself. I agree with Dr. Barbour's (Hem-Onc fellow) note above. Situation discussed with him.
Agree with above, patient very cachectic and ill-appearing, will c/w pain medications as above,  considering CMO but has not decided on that course yet    ______________  Davi Mckinley MD  Palliative Medicine  [facility]  [phone]

## 2022-02-18 NOTE — CONSULT NOTE ADULT - PROBLEM SELECTOR RECOMMENDATION 4
s/p paracentesis, will not tolerate IVF Severely cachectic, family aware that nutritional intervention would be futile in the case

## 2022-02-18 NOTE — CONSULT NOTE ADULT - SUBJECTIVE AND OBJECTIVE BOX
BUSHRA CAST 61y Female  MRN#: [id number]   Hospital Day: 1d    62 yo female with PMH of metastatic pancreatic cancer on chemotherapy (Dx 2021, follows Dr. 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 The patient denies a fall ,hitting her head or LOC. She denies any pain anywhere. Reports that she has had generalized weakness for the last few days and has had decreased po intake. She denies headache, chest pain, sob, abd pain, urinary frequency, urgency, constipation or diarrhea. All ros negative except as above.     REVIEW OF SYMPTOMS:  CONSTITUTIONAL: No weakness, fevers or chills; No headaches  EYES: No visual changes, eye pain, or discharge  ENT: No vertigo; No ear pain or change in hearing; No sore throat or difficulty swallowing  NECK: No pain or stiffness  RESPIRATORY: No cough, wheezing, or hemoptysis; No shortness of breath  CARDIOVASCULAR: No chest pain or palpitations  GASTROINTESTINAL: No abdominal or epigastric pain; No nausea, vomiting, or hematemesis; No diarrhea or constipation; No melena or hematochezia  GENITOURINARY: No dysuria, frequency or hematuria  MUSCULOSKELETAL: No joint pain, no muscle pain, no weakness  NEUROLOGICAL: No numbness or weakness  SKIN: No itching or rashes    OBJECTIVE  PAST MEDICAL & SURGICAL HISTORY  HTN (hypertension)  Pancreatic cancer  Single delivery by  section    ALLERGIES:  No Known Allergies    MEDICATIONS:  STANDING MEDICATIONS  cefTRIAXone   IVPB      cefTRIAXone   IVPB 2000 milliGRAM(s) IV Intermittent every 24 hours  chlorhexidine 4% Liquid 1 Application(s) Topical <User Schedule>  influenza   Vaccine 0.5 milliLiter(s) IntraMuscular once  multivitamin 1 Tablet(s) Oral daily  oxyCODONE  ER Tablet 10 milliGRAM(s) Oral every 12 hours  pancrelipase  (CREON 36,000 Lipase Units) 1 Capsule(s) Oral three times a day with meals  pantoprazole    Tablet 40 milliGRAM(s) Oral before breakfast  phytonadione  IVPB 5 milliGRAM(s) IV Intermittent once    PRN MEDICATIONS  morphine  - Injectable 4 milliGRAM(s) IV Push every 3 hours PRN  ondansetron Injectable 4 milliGRAM(s) IV Push every 6 hours PRN      VITAL SIGNS: Last 24 Hours  T(C): 36.2 (2022 05:12), Max: 36.7 (2022 16:15)  T(F): 97.2 (2022 05:12), Max: 98.1 (2022 16:15)  HR: 123 (2022 05:12) (99 - 123)  BP: 90/55 (2022 05:12) (83/56 - 95/60)  BP(mean): 71 (2022 21:00) (71 - 71)  RR: 18 (2022 05:12) (18 - 20)  SpO2: 98% (2022 20:11) (92% - 98%)    LABS:                        9.5    13.00 )-----------( 230      ( 2022 07:00 )             30.4     02-17    131<L>  |  96<L>  |  23<H>  ----------------------------<  66<L>  4.3   |  22  |  0.9    Ca    8.4<L>      2022 12:56  Mg     1.7     -17    TPro  4.7<L>  /  Alb  2.2<L>  /  TBili  0.9  /  DBili  x   /  AST  16  /  ALT  9   /  AlkPhos  85  02-17    PT/INR - ( 2022 12:56 )   PT: 30.40 sec;   INR: 2.67 ratio         PTT - ( 2022 12:56 )  PTT:33.2 sec    PHYSICAL EXAM:  CONSTITUTIONAL: No acute distress, well-developed, well-groomed, AAOx3  HEAD: Atraumatic, normocephalic  EYES: EOM intact, PERRLA, conjunctiva and sclera clear  ENT: Supple, no masses, no thyromegaly, no bruits, no JVD; moist mucous membranes  PULMONARY: Clear to auscultation bilaterally; no wheezes, rales, or rhonchi  CARDIOVASCULAR: Regular rate and rhythm; no murmurs, rubs, or gallops  GASTROINTESTINAL: Soft, non-tender, non-distended; bowel sounds present  MUSCULOSKELETAL: 2+ peripheral pulses; no clubbing, no cyanosis, no edema  NEUROLOGY: non-focal  SKIN: No rashes or lesions; warm and dry    ASSESSMENT & PLAN  Patient is a 61 year old female with advanced pancreatic cancer complicated by obstructive jaundice, portal vein encasement and dilated CBD s/p metallic stent, splenomegaly and suspected varices. Patient was previously treated with 5 cycles of FOlFIRINOX. Regimen was poorly tolerated due to severe diarrhea, hypokalemia, and marked weight loss. Patient not on [place]-Abraxane. She developed large volume ascites and anasarca secondary to portal vein thrombosis. Most recent imaging consistent with progression and new thrombosis of the portal vein, for which she was started on Eliquis Last imaging 2021.     Advanced Pancreatic Cancer  - Follows with Dr. Hayward, last seen on    - s/p cycle 4 of [place]-Abraxane (every 2 weeks)   - on lasix 20 mg daily and spironolactone daily at home due to anasarca   - on eliquis due to portal vein thrombosis, currently on hold for upcoming paracentesis  -  4600 (2021) --> 105(1/10) --> 124 ()   - was planned for repeat imaging next week     Recommendations   *pending discussion with the attending  BUSHRA CAST 61y Female  MRN#: [id number]   Hospital Day: 1d    62 yo female with PMH of metastatic pancreatic cancer on chemotherapy (Dx 2021, follows Dr. 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 The patient denies a fall ,hitting her head or LOC. She denies any pain anywhere. Reports that she has had generalized weakness for the last few days and has had decreased po intake. She denies headache, chest pain, sob, abd pain, urinary frequency, urgency, constipation or diarrhea. All ros negative except as above.     REVIEW OF SYMPTOMS: Unable to obtain due to patient's mental status    OBJECTIVE  PAST MEDICAL & SURGICAL HISTORY  HTN (hypertension)  Pancreatic cancer  Single delivery by  section    ALLERGIES:  No Known Allergies    MEDICATIONS:  STANDING MEDICATIONS  cefTRIAXone   IVPB      cefTRIAXone   IVPB 2000 milliGRAM(s) IV Intermittent every 24 hours  chlorhexidine 4% Liquid 1 Application(s) Topical <User Schedule>  influenza   Vaccine 0.5 milliLiter(s) IntraMuscular once  multivitamin 1 Tablet(s) Oral daily  oxyCODONE  ER Tablet 10 milliGRAM(s) Oral every 12 hours  pancrelipase  (CREON 36,000 Lipase Units) 1 Capsule(s) Oral three times a day with meals  pantoprazole    Tablet 40 milliGRAM(s) Oral before breakfast  phytonadione  IVPB 5 milliGRAM(s) IV Intermittent once    PRN MEDICATIONS  morphine  - Injectable 4 milliGRAM(s) IV Push every 3 hours PRN  ondansetron Injectable 4 milliGRAM(s) IV Push every 6 hours PRN      VITAL SIGNS: Last 24 Hours  T(C): 36.2 (2022 05:12), Max: 36.7 (2022 16:15)  T(F): 97.2 (2022 05:12), Max: 98.1 (2022 16:15)  HR: 123 (2022 05:12) (99 - 123)  BP: 90/55 (2022 05:12) (83/56 - 95/60)  BP(mean): 71 (2022 21:00) (71 - 71)  RR: 18 (2022 05:12) (18 - 20)  SpO2: 98% (2022 20:11) (92% - 98%)    LABS:                        9.5    13.00 )-----------( 230      ( 2022 07:00 )             30.4     02-17    131<L>  |  96<L>  |  23<H>  ----------------------------<  66<L>  4.3   |  22  |  0.9    Ca    8.4<L>      2022 12:56  Mg     1.7     02-    TPro  4.7<L>  /  Alb  2.2<L>  /  TBili  0.9  /  DBili  x   /  AST  16  /  ALT  9   /  AlkPhos  85  02-    PT/INR - ( 2022 12:56 )   PT: 30.40 sec;   INR: 2.67 ratio         PTT - ( 2022 12:56 )  PTT:33.2 sec    PHYSICAL EXAM:  CONSTITUTIONAL: No acute distress, well-developed, well-groomed, AAOx3  HEAD: Atraumatic, normocephalic  EYES: EOM intact, PERRLA, conjunctiva and sclera clear  ENT: Supple, no masses, no thyromegaly, no bruits, no JVD; moist mucous membranes  PULMONARY: Clear to auscultation bilaterally; no wheezes, rales, or rhonchi  CARDIOVASCULAR: Regular rate and rhythm; no murmurs, rubs, or gallops  GASTROINTESTINAL: Soft, non-tender, non-distended; bowel sounds present  MUSCULOSKELETAL: 2+ peripheral pulses; no clubbing, no cyanosis, no edema  NEUROLOGY: non-focal  SKIN: No rashes or lesions; warm and dry    ASSESSMENT & PLAN  Patient is a 61 year old female with advanced pancreatic cancer complicated by obstructive jaundice, portal vein encasement and dilated CBD s/p metallic stent, splenomegaly and suspected varices. Patient was previously treated with 5 cycles of FOlFIRINOX. Regimen was poorly tolerated due to severe diarrhea, hypokalemia, and marked weight loss. Patient not on [place]-Abraxane. She developed large volume ascites and anasarca secondary to portal vein thrombosis. Most recent imaging consistent with progression and new thrombosis of the portal vein, for which she was started on Eliquis Last imaging 2021.     Advanced Pancreatic Cancer  - Follows with Dr. Hayward, last seen on    - s/p cycle 4 of [place]-Abraxane (every 2 weeks)   - on lasix 20 mg daily and spironolactone daily at home due to anasarca   - on Eliquis due to portal vein thrombosis, currently on hold for upcoming paracentesis  -  4600 (2021) --> 105(1/10) --> 124 ()   - was planned for repeat imaging next week     Recommendations   *pending discussion with the attending  BUSHRA CAST 61y Female  MRN#: [id number]   Hospital Day: 1d    60 yo female with PMH of metastatic pancreatic cancer on chemotherapy (Dx 2021, follows Dr. 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 The patient denies a fall ,hitting her head or LOC. She denies any pain anywhere. Reports that she has had generalized weakness for the last few days and has had decreased po intake. She denies headache, chest pain, sob, abd pain, urinary frequency, urgency, constipation or diarrhea. All ros negative except as above.     REVIEW OF SYMPTOMS: Unable to obtain due to patient's mental status    OBJECTIVE  PAST MEDICAL & SURGICAL HISTORY  HTN (hypertension)  Pancreatic cancer  Single delivery by  section    ALLERGIES:  No Known Allergies    MEDICATIONS:  STANDING MEDICATIONS  cefTRIAXone   IVPB      cefTRIAXone   IVPB 2000 milliGRAM(s) IV Intermittent every 24 hours  chlorhexidine 4% Liquid 1 Application(s) Topical <User Schedule>  influenza   Vaccine 0.5 milliLiter(s) IntraMuscular once  multivitamin 1 Tablet(s) Oral daily  oxyCODONE  ER Tablet 10 milliGRAM(s) Oral every 12 hours  pancrelipase  (CREON 36,000 Lipase Units) 1 Capsule(s) Oral three times a day with meals  pantoprazole    Tablet 40 milliGRAM(s) Oral before breakfast  phytonadione  IVPB 5 milliGRAM(s) IV Intermittent once    PRN MEDICATIONS  morphine  - Injectable 4 milliGRAM(s) IV Push every 3 hours PRN  ondansetron Injectable 4 milliGRAM(s) IV Push every 6 hours PRN      VITAL SIGNS: Last 24 Hours  T(C): 36.2 (2022 05:12), Max: 36.7 (2022 16:15)  T(F): 97.2 (2022 05:12), Max: 98.1 (2022 16:15)  HR: 123 (2022 05:12) (99 - 123)  BP: 90/55 (2022 05:12) (83/56 - 95/60)  BP(mean): 71 (2022 21:00) (71 - 71)  RR: 18 (2022 05:12) (18 - 20)  SpO2: 98% (2022 20:11) (92% - 98%)    LABS:                        9.5    13.00 )-----------( 230      ( 2022 07:00 )             30.4     02-17    131<L>  |  96<L>  |  23<H>  ----------------------------<  66<L>  4.3   |  22  |  0.9    Ca    8.4<L>      2022 12:56  Mg     1.7     0217    TPro  4.7<L>  /  Alb  2.2<L>  /  TBili  0.9  /  DBili  x   /  AST  16  /  ALT  9   /  AlkPhos  85  02-17    PT/INR - ( 2022 12:56 )   PT: 30.40 sec;   INR: 2.67 ratio         PTT - ( 2022 12:56 )  PTT:33.2 sec    PHYSICAL EXAM:  CONSTITUTIONAL: Frail, ill appearing  HEAD: Atraumatic, normocephalic  EYES: EOM intact, PERRLA, conjunctiva and sclera clear  ENT: Supple, no masses, no thyromegaly, no bruits, no JVD; moist mucous membranes  PULMONARY: Clear to auscultation bilaterally; no wheezes, rales, or rhonchi  CARDIOVASCULAR: Regular rate and rhythm; no murmurs, rubs, or gallops  GASTROINTESTINAL: distended  MUSCULOSKELETAL: 2+ peripheral pulses; no clubbing, no cyanosis, no edema  NEUROLOGY: non-focal, lethargic  SKIN: No rashes or lesions; warm and dry           BUSHRA CAST 61y Female  MRN#: [id number]   Hospital Day: 1d    62 yo female with PMH of metastatic pancreatic cancer on chemotherapy (Dx 2021, follows Dr. Hayward) complicated by splenomegaly/ ascites on Lasix and paracenthesis in the past, and splenic/portal vein thrombosis on Eliquis, HTN, presents to the hospital after sliding off the commode due to difficulty getting up. The patient denies a fall, hitting her head or LOC. She denies any pain anywhere. Reports that she has had generalized weakness for the last few days and has had decreased po intake. She denies headache, chest pain, sob, abd pain, urinary frequency, urgency, constipation or diarrhea. All ROS negative except as above.     REVIEW OF SYMPTOMS: Unable to obtain due to patient's mental status    OBJECTIVE  PAST MEDICAL & SURGICAL HISTORY  HTN (hypertension)  Pancreatic cancer  Single delivery by  section    ALLERGIES:  No Known Allergies    MEDICATIONS:  STANDING MEDICATIONS  cefTRIAXone   IVPB      cefTRIAXone   IVPB 2000 milliGRAM(s) IV Intermittent every 24 hours  chlorhexidine 4% Liquid 1 Application(s) Topical <User Schedule>  influenza   Vaccine 0.5 milliLiter(s) IntraMuscular once  multivitamin 1 Tablet(s) Oral daily  oxyCODONE  ER Tablet 10 milliGRAM(s) Oral every 12 hours  pancrelipase  (CREON 36,000 Lipase Units) 1 Capsule(s) Oral three times a day with meals  pantoprazole    Tablet 40 milliGRAM(s) Oral before breakfast  phytonadione  IVPB 5 milliGRAM(s) IV Intermittent once    PRN MEDICATIONS  morphine  - Injectable 4 milliGRAM(s) IV Push every 3 hours PRN  ondansetron Injectable 4 milliGRAM(s) IV Push every 6 hours PRN      VITAL SIGNS: Last 24 Hours  T(C): 36.2 (2022 05:12), Max: 36.7 (2022 16:15)  T(F): 97.2 (2022 05:12), Max: 98.1 (2022 16:15)  HR: 123 (2022 05:12) (99 - 123)  BP: 90/55 (2022 05:12) (83/56 - 95/60)  BP(mean): 71 (2022 21:00) (71 - 71)  RR: 18 (2022 05:12) (18 - 20)  SpO2: 98% (2022 20:11) (92% - 98%)    LABS:                        9.5    13.00 )-----------( 230      ( 2022 07:00 )             30.4     02-17    131<L>  |  96<L>  |  23<H>  ----------------------------<  66<L>  4.3   |  22  |  0.9    Ca    8.4<L>      2022 12:56  Mg     1.7     0217    TPro  4.7<L>  /  Alb  2.2<L>  /  TBili  0.9  /  DBili  x   /  AST  16  /  ALT  9   /  AlkPhos  85  02-17    PT/INR - ( 2022 12:56 )   PT: 30.40 sec;   INR: 2.67 ratio         PTT - ( 2022 12:56 )  PTT:33.2 sec    PHYSICAL EXAM:  CONSTITUTIONAL: Frail, ill appearing  HEAD: Atraumatic, normocephalic  EYES: EOM intact, PERRLA, conjunctiva and sclera clear  ENT: Supple, no masses, no thyromegaly, no bruits, no JVD; moist mucous membranes  PULMONARY: Clear to auscultation bilaterally; no wheezes, rales, or rhonchi  CARDIOVASCULAR: Regular rate and rhythm; no murmurs, rubs, or gallops  GASTROINTESTINAL: distended  MUSCULOSKELETAL: 2+ peripheral pulses; no clubbing, no cyanosis, no edema  NEUROLOGY: non-focal, lethargic  SKIN: No rashes or lesions; warm and dry

## 2022-02-18 NOTE — SWALLOW BEDSIDE ASSESSMENT ADULT - SWALLOW EVAL: DIAGNOSIS
mild oral dysphagia for soft and bite sized consistencies and thin liquids w/o overt s/s aspiration/penetration; mod oral dysphagia for regular consistency

## 2022-02-18 NOTE — CHART NOTE - NSCHARTNOTEFT_GEN_A_CORE
Pt was seen at the bedside, pt is cachectic with profound muscle waisting, juandice, c/o abdominal pain, approaching end of life. She was diagnosed with stage IV pancreatic CA last July and treated with chemotherapy, unfortunately her condition is worsening.   Case d/w medical residents during teaching rounds, will consult medical oncology and palliative care for GOC conversation ( pt is actively dyeing and will benefit from comfort and palliative care).  She is a full code with grave prognosis.   I spoke with oncology resident will transfer service to medical oncology attending.

## 2022-02-18 NOTE — CONSULT NOTE ADULT - PROBLEM SELECTOR RECOMMENDATION 5
Severely cachectic, family aware that nutritional intervention would be futile in the case Full code  GOC conversation above.   Considering DNR/DNI and comfort care

## 2022-02-18 NOTE — CONSULT NOTE ADULT - PROBLEM SELECTOR RECOMMENDATION 2
D/C all previous medications  - Dilaudid 0.5mg IV q 2 hrs PRN for pain 4-10 Pt nearing end of life. No further treatment options

## 2022-02-18 NOTE — SWALLOW BEDSIDE ASSESSMENT ADULT - SLP GENERAL OBSERVATIONS
pt received in bed awake +generalized weakness, being fed breakfast by RN student; +room air +cachectic

## 2022-02-19 NOTE — PROGRESS NOTE ADULT - ASSESSMENT
60 yo female with PMH of metastatic pancreatic cancer on chemotherapy (Dx 7/2021, follows Dr. 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    #End stage of   Metastatic pancreatic cancer / HO Splenic/portal vein thrombosis/  malignant Ascites / suspected SBP/ anemia / Hypotension / STEPHANI/ LA / Hyponatremia / severe malnutrition/ Hypoglycemia   - pt is actively dyeing   - consulted by oncology, no cancer directed therapy recommended   - palliative care team  discussed GOC with family, DNR/DNI singed on 2/18  - I spoke with family today, they agreed for comfort/ end of life care, hospice consulted, no VS, blood work, will d/c pressors and Abx after pt's sister arrival.   - Prognosis grave

## 2022-02-19 NOTE — CHART NOTE - NSCHARTNOTEFT_GEN_A_CORE
Consult received-- patient ordered for comfort measures only as of 2/19. No nutrition intervention warranted at this time.    Can contact if [facility] changes  Bryon Contreras, RD #[phone]

## 2022-02-19 NOTE — PROGRESS NOTE ADULT - SUBJECTIVE AND OBJECTIVE BOX
62 yo female with PMH of metastatic pancreatic cancer on chemotherapy (Dx 2021, follows Dr. 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   Pt is cachectic with profound muscle waisting, juandice. She was diagnosed with stage IV pancreatic CA last July and treated with chemotherapy, unfortunately her condition is worsening.   Case d/w family at length today, they agreed for comfort care ( pt is actively dyeing), pt looks comfortable, will consult hospice.       OBJECTIVE  PAST MEDICAL & SURGICAL HISTORY  HTN (hypertension)    Pancreatic cancer    Single delivery by  section      ALLERGIES:  No Known Allergies      HOME MEDICATIONS  Home Medications:  ONDANSETRON HCL 8 MG TABLET:  (2022 17:37)                           VITAL SIGNS: Last 24 Hours  T(C): 36.2 (2022 04:34), Max: 36.4 (2022 22:49)  T(F): 97.1 (2022 04:34), Max: 97.5 (2022 22:49)  HR: 84 (2022 10:00) (80 - 120)  BP: 83/54 (2022 10:00) (68/51 - 91/53)  BP(mean): 63 (2022 06:55) (56 - 71)  RR: 12 (2022 04:34) (10 - 16)  SpO2: 100% (2022 04:34) (70% - 100%)      PHYSICAL EXAM:  General: cachetic, with temporal muscle waiting   LUNGS: decreased BS at bases   HEART: RRR, +S1,S2,  ABDOMEN: distended, tense, dull to percussion  EXT: Warm, well perfused x 4, no edema   NEURO: comatose    LABS:                                     9.5    18.41 )-----------( 163      ( 2022 07:00 )             30.9   02-19    133<L>  |  98  |  31<H>  ----------------------------<  222<H>  5.3<H>   |  20  |  2.1<H>    Ca    8.1<L>      2022 07:00  Mg     2.2         TPro  4.5<L>  /  Alb  1.9<L>  /  TBili  0.6  /  DBili  x   /  AST  28  /  ALT  14  /  AlkPhos  103      RADIOLOGY:    ACC: [id number] EXAM:  XR CHEST PORTABLE URGENT 1V                          PROCEDURE DATE:  2022          INTERPRETATION:  Clinical History / Reason for exam: Weakness.    Comparison : Chest radiograph None.    Technique/Positioning: Single frontal view the chest.    Findings:    Support devices: Right IJ Port-A-Cath with tip projecting over the   cavoatrial junction. It has been needle access.    Cardiac/mediastinum/hilum: Unremarkable.    Lung parenchyma/Pleura: Low lung volumes. No acute consolidation. No   pneumothorax.    Skeleton/soft tissues: Mild degenerative changes. Biliary stent noted in   the upper abdomen    Impression:    Low lung volumes. No acute consolidation.    MEDICATIONS  (STANDING):  cefepime   IVPB 1000 milliGRAM(s) IV Intermittent every 12 hours  chlorhexidine 4% Liquid 1 Application(s) Topical <User Schedule>  dextrose 5% + lactated ringers. 1000 milliLiter(s) (70 mL/Hr) IV Continuous <Continuous>  influenza   Vaccine 0.5 milliLiter(s) IntraMuscular once  multivitamin 1 Tablet(s) Oral daily  norepinephrine Infusion 0.05 MICROgram(s)/kG/Min (5.1 mL/Hr) IV Continuous <Continuous>  pancrelipase  (CREON 36,000 Lipase Units) 1 Capsule(s) Oral three times a day with meals  pantoprazole    Tablet 40 milliGRAM(s) Oral before breakfast    MEDICATIONS  (PRN):  HYDROmorphone  Injectable 0.5 milliGRAM(s) IV Push every 3 hours PRN pain 4-10  ondansetron Injectable 4 milliGRAM(s) IV Push every 6 hours PRN Nausea and/or Vomiting                                                 --------------------------------------------------------------

## 2022-02-20 NOTE — PROGRESS NOTE ADULT - TIME BILLING
direct pt's care, communication with medical team and family, chart review
direct pt's care, communication with medical team and family, chart review

## 2022-02-20 NOTE — DISCHARGE NOTE FOR THE EXPIRED PATIENT - HOSPITAL COURSE
20[birth year]2021 62 yo female with PMH of metastatic pancreatic cancer on chemotherapy (Dx 7/2021, follows Dr. Hayward) complicated by splenomegaly/ ascites on lasix and paracentesis in the past and splenic/portal vein thrombosis on Eliquis, HTN presents to the hospital after sliding off the commode due to difficulty getting up, generalized weakness for the last few days and has had decreased po intake. She was found to be septic on admission with gram positive bacteremia. Patient also was cachectic with profound muscle waisting, juandice. She was diagnosed with stage IV pancreatic CA last July and treated with chemotherapy, unfortunately her condition is worsening. Palliative consulted. Case d/w family at length, they agreed for comfort car. Patient was made, comfortable. Family agreed on stopping IV fluids, abx and pressure support medications. Patient passed away today at 8:40pm according to cardiopulmonary criteria. Family were at bedside during the day.  Gab notified.

## 2022-02-20 NOTE — PROGRESS NOTE ADULT - SUBJECTIVE AND OBJECTIVE BOX
BUSHRA CAST 61y Female  MRN#: [id number]   CODE STATUS: DNR DNI     Hospital Day: 3d    Pt is currently admitted with the primary diagnosis of weakness     SUBJECTIVE    no acute events overnight, pt seen and examined, ill appearing,   no new complaints                                             ----------------------------------------------------------  OBJECTIVE  PAST MEDICAL & SURGICAL HISTORY  HTN (hypertension)    Pancreatic cancer    Single delivery by  section                                              -----------------------------------------------------------  ALLERGIES:  No Known Allergies                                            ------------------------------------------------------------    HOME MEDICATIONS  Home Medications:  ONDANSETRON HCL 8 MG TABLET:  (2022 17:37)                           MEDICATIONS:  STANDING MEDICATIONS  cefepime   IVPB 1000 milliGRAM(s) IV Intermittent every 12 hours  chlorhexidine 4% Liquid 1 Application(s) Topical <User Schedule>  dextrose 5% + sodium chloride 0.9%. 1000 milliLiter(s) IV Continuous <Continuous>  influenza   Vaccine 0.5 milliLiter(s) IntraMuscular once  multivitamin 1 Tablet(s) Oral daily  norepinephrine Infusion 0.08 MICROgram(s)/kG/Min IV Continuous <Continuous>  pancrelipase  (CREON 36,000 Lipase Units) 1 Capsule(s) Oral three times a day with meals  pantoprazole    Tablet 40 milliGRAM(s) Oral before breakfast    PRN MEDICATIONS  HYDROmorphone  Injectable 0.5 milliGRAM(s) IV Push every 3 hours PRN  ondansetron Injectable 4 milliGRAM(s) IV Push every 6 hours PRN                                            ------------------------------------------------------------  VITAL SIGNS: Last 24 Hours  T(C): 36 (2022 19:33), Max: 36.2 (2022 04:34)  T(F): 96.8 (2022 19:33), Max: 97.1 (2022 04:34)  HR: 120 (2022 23:02) (80 - 120)  BP: 104/58 (2022 23:02) (68/51 - 104/58)  BP(mean): 76 (:02) (56 - 76)  RR: 12 (:02) (12 - 14)  SpO2: 93% (:) (93% - 100%)      22 @ 07:01  -  22 @ 07:00  --------------------------------------------------------  IN: 630 mL / OUT: 0 mL / NET: 630 mL                                             --------------------------------------------------------------  LABS:                        9.5    18.41 )-----------( 163      ( 2022 07:00 )             30.9     02-    133<L>  |  98  |  31<H>  ----------------------------<  222<H>  5.3<H>   |  20  |  2.1<H>    Ca    8.1<L>      2022 07:00  Mg     2.2         TPro  4.5<L>  /  Alb  1.9<L>  /  TBili  0.6  /  DBili  x   /  AST  28  /  ALT  14  /  AlkPhos  103      PT/INR - ( 2022 07:00 )   PT: 37.90 sec;   INR: 3.34 ratio         PTT - ( 2022 07:00 )  PTT:39.1 sec      Lactate, Blood: 6.5 mmol/L *HH* (22 @ 07:00)        Culture - Blood (collected 2022 20:50)  Source: .Blood Blood-Peripheral  Gram Stain (2022 21:50):    Growth in aerobic and anaerobic bottles: Gram Positive Cocci in Pairs and    Chains  Preliminary Report (2022 21:50):    Growth in aerobic and anaerobic bottles: Gram Positive Cocci in Pairs and    Chains    ***Blood Panel PCR results on this specimen are available    approximately 3 hours after the Gram stain result.***    Gram stain, PCR, and/or culture results may not always    correspond due to difference in methodologies.    ************************************************************    This PCR assay was performed by multiplex PCR. This    Assay tests for 66 bacterial and resistance gene targets.    Please refer to the [facility] Labs test directory    at [email or web address] for details.  Organism: Blood Culture PCR (2022 20:52)  Organism: Blood Culture PCR (2022 20:52)                                                    -------------------------------------------------------------  RADIOLOGY:    ACC: [id number] EXAM:  XR CHEST PORTABLE URGENT 1V                          PROCEDURE DATE:  2022          INTERPRETATION:  Clinical History / Reason for exam: Sepsis    Comparison : Chest radiograph 2022.    Technique/Positioning: [place] radiograph.    Findings:    Support devices: Right chest port tip overlying the SVC/right atrial   junction    Cardiac/mediastinum/hilum: Unchanged    Lung parenchyma/Pleura: Right basilar opacity. Left pleural effusion. No   definite pneumothorax.    Skeleton/soft tissues: Unchanged    Impression:    Right basilar opacity. Left pleural effusion.    --- End of Report ---            ADELFO HELLER MD; Attending Radiologist  This document has been electronically signed. 2022 11:06AM                                              --------------------------------------------------------------    PHYSICAL EXAM:  General: cachectic, ill appearing   LUNGS: air entry bilat, decreased lower lung bases   HEART: RRR, +S1,S2,  ABDOMEN: distended, tender, dull to percussion   EXT: no edema   NEURO: AxOx3,  SKIN: No new breakdown or rashes noted                                           --------------------------------------------------------------

## 2022-02-20 NOTE — HOSPICE CARE NOTE - CONVESATION DETAILS
Consult in progress. Per documentation patient active at this time. Hospice available should patient become stable for D/C plan. Message left for patient's spouse Gab to discuss home hospice services.

## 2022-02-20 NOTE — PROGRESS NOTE ADULT - SUBJECTIVE AND OBJECTIVE BOX
60 yo female with PMH of metastatic pancreatic cancer on chemotherapy (Dx 2021, follows Dr. 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   Pt is cachectic with profound muscle waisting, juandice. She was diagnosed with stage IV pancreatic CA last July and treated with chemotherapy, unfortunately her condition is worsening.   Case d/w family at length, they agreed for comfort care ( pt is actively dyeing), pt looks comfortable,  hospice consult is pending, family agreed on stopping IV fluids, Abx and pressure support medications.       OBJECTIVE  PAST MEDICAL & SURGICAL HISTORY  HTN (hypertension)    Pancreatic cancer    Single delivery by  section      ALLERGIES:  No Known Allergies      HOME MEDICATIONS  Home Medications:  ONDANSETRON HCL 8 MG TABLET:  (2022 17:37)                           VITAL SIGNS: Last 24 Hours  T(C): 36.9 (2022 05:38), Max: 36.9 (2022 05:38)  T(F): 98.5 (2022 05:38), Max: 98.5 (2022 05:38)  HR: 110 (2022 05:38) (100 - 120)  BP: 97/64 (2022 05:38) (88/52 - 104/58)  BP(mean): 76 (2022 05:38) (70 - 76)  RR: 10 (2022 05:38) (10 - 14)  SpO2: 94% (2022 05:38) (93% - 95%)      PHYSICAL EXAM:  General: cachetic, with temporal muscle waiting   LUNGS: decreased BS at bases   HEART: RRR, +S1,S2,  ABDOMEN: distended, tense, dull to percussion  EXT: Warm, well perfused x 4, no edema   NEURO: comatose    LABS:                                     9.5    18.41 )-----------( 163      ( 2022 07:00 )             30.9   02-19    133<L>  |  98  |  31<H>  ----------------------------<  222<H>  5.3<H>   |  20  |  2.1<H>    Ca    8.1<L>      2022 07:00  Mg     2.2         TPro  4.5<L>  /  Alb  1.9<L>  /  TBili  0.6  /  DBili  x   /  AST  28  /  ALT  14  /  AlkPhos  103      RADIOLOGY:    ACC: [id number] EXAM:  XR CHEST PORTABLE URGENT 1V                          PROCEDURE DATE:  2022          INTERPRETATION:  Clinical History / Reason for exam: Weakness.    Comparison : Chest radiograph None.    Technique/Positioning: Single frontal view the chest.    Findings:    Support devices: Right IJ Port-A-Cath with tip projecting over the   cavoatrial junction. It has been needle access.    Cardiac/mediastinum/hilum: Unremarkable.    Lung parenchyma/Pleura: Low lung volumes. No acute consolidation. No   pneumothorax.    Skeleton/soft tissues: Mild degenerative changes. Biliary stent noted in   the upper abdomen    Impression:    Low lung volumes. No acute consolidation.      MEDICATIONS  (PRN):  HYDROmorphone  Injectable 0.5 milliGRAM(s) IV Push every 3 hours PRN pain 4-10  ondansetron Injectable 4 milliGRAM(s) IV Push every 6 hours PRN Nausea and/or Vomiting

## 2022-02-20 NOTE — CHART NOTE - NSCHARTNOTEFT_GEN_A_CORE
Spoke to nurse palliative care following patient   RE Patient is now comfort care and actively dying. Attending notes and Hospice note read and appreciated. Pt still on NRB mask according to RN with respiratory effort. No meds ordered for comfort at this time. Only DIlaudid PRN for pain. Pt likely  in hospital.     MEDD: 10mg/24hrs    Recommend    DNR/DNI/CMO  No labs/VS/tests/ABT/IVF/and d/c NRB mask  start Dilaudid 0.5mg IV q 1 hr PRN for pain/dyspnea  start Ativan 0.5mg IV q 1 hr PRN for restlessness/anxiety   call x [phone] for palliative care team   above d/w RN Spoke to nurse palliative care following patient   RE Patient is now comfort care and actively dying. Attending notes and Hospice note read and appreciated. Pt still on NRB mask according to RN with respiratory effort. No meds ordered for comfort at this time. Only DIlaudid PRN for pain. Pt likely  in hospital. Left message for patient's  Gab to call palliative care team if needed.     MEDD: 10mg/24hrs    Recommend    DNR/DNI/CMO  No labs/VS/tests/ABT/IVF/and d/c NRB mask  start Dilaudid 0.5mg IV q 1 hr PRN for pain/dyspnea  start Ativan 0.5mg IV q 1 hr PRN for restlessness/anxiety   call x [phone] for palliative care team   above d/w RN

## 2022-02-20 NOTE — PROGRESS NOTE ADULT - ASSESSMENT
60 yo female with PMH of metastatic pancreatic cancer on chemotherapy (Dx 7/2021, follows Dr. 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    #Sepsis POA on admission   #septic shock on levophed   #Gram positive bacteremia   - continue cefepime, start vancomycin 1g qd, fu trough   - repeat blood cultures   - TTE     #STEPHANI prerenal vs hepatorenal   # hypovolemic Hyponatremia   - continue IVF D5 ns at 70ml/hr   - monitor kidney function   - renal dosing of medication  - f/u urine and serum osm , urine sodium     #hypoxic respiratory failure on NRB   - unclear etiology, pleural effusion vs pna vs PE ddimer elevated   - cxr right basilar opacity, left pleural effusion   - duplex LE     # Metastatic pancreatic cancer on chemotherapy   # HO Splenic/portal vein thrombosis  # malignant Ascites r/o SBP   # anemia   - CT a/p w IV con 11/29/2021: Portal venous structure evaluation limited. Rind of soft tissue surrounding proximal celiac artery as well as pancreatic ductal dilatation consistent with extension of neoplasm. Soft tissue density within CBD stent with associated mild to moderate intrahepatic biliary ductal dilatation.  - moderate abdominal tenderness on exam   - completed Foifininox which she was not able to tolerate well due to severe diarrhea, hypokalemia and weight loss on [place]-Abraxane per chart review  - on Lasix 40 mg at home, holding given hypotension   - heme onc evaluation   - palliative care consult   - will require paracentesis, US abdomen, hold eliquis given possible need for paracentesis and INR 2.67 (goal INR < 2), vitamin K 5mg IV once,  - keep active T&S  - abx cefepime and vanco    #malnutrition   #hypoalbuminemia   #hypoglycemia   - speech and swallow appreciated, soft and bite sized   - dietician eval   - d5 ns at 75ml/hr     #DVT PPx: SCD , hold eliquis   #GI PPx: PPI  #diet- soft and bite sized   code- DNR DNI          60 yo female with PMH of metastatic pancreatic cancer on chemotherapy (Dx 7/2021, follows Dr. 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    #Sepsis POA on admission   #septic shock on levophed   #Gram positive bacteremia   - continue cefepime, start vancomycin 1g qd, fu trough   - repeat blood cultures   - TTE     #STEPHANI prerenal vs hepatorenal   # hypovolemic Hyponatremia   - continue IVF D5 ns at 70ml/hr   - monitor kidney function   - renal dosing of medication  - f/u urine and serum osm , urine sodium     #hypoxic respiratory failure on NRB   - unclear etiology, pleural effusion vs pna vs PE (ddimer elevated) eliquis held for possible para.    - cxr right basilar opacity, left pleural effusion   - duplex LE,      # Metastatic pancreatic cancer on chemotherapy   # HO Splenic/portal vein thrombosis  # malignant Ascites r/o SBP   # anemia   - CT a/p w IV con 11/29/2021: Portal venous structure evaluation limited. Rind of soft tissue surrounding proximal celiac artery as well as pancreatic ductal dilatation consistent with extension of neoplasm. Soft tissue density within CBD stent with associated mild to moderate intrahepatic biliary ductal dilatation.  - moderate abdominal tenderness on exam   - completed Foifininox which she was not able to tolerate well due to severe diarrhea, hypokalemia and weight loss on [place]-Abraxane per chart review  - on Lasix 40 mg at home, holding given hypotension   - heme onc evaluation   - palliative care consult   - will require paracentesis, US abdomen, hold eliquis given possible need for paracentesis   - keep active T&S  - abx cefepime and vanco    #malnutrition   #hypoalbuminemia   #hypoglycemia   - speech and swallow appreciated, soft and bite sized   - dietician eval   - d5 ns at 75ml/hr     #DVT PPx: SCD , hold eliquis   #GI PPx: PPI  #diet- soft and bite sized   code- DNR DNI          62 yo female with PMH of metastatic pancreatic cancer on chemotherapy (Dx 7/2021, follows Dr. 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    #Sepsis POA    #septic shock on levophed   #Gram positive bacteremia   - continue cefepime, start vancomycin 1g qd, fu trough   - repeat blood cultures   - TTE     #STEPHAIN prerenal vs hepatorenal   # hypovolemic Hyponatremia   - continue IVF D5 ns at 70ml/hr   - monitor kidney function   - renal dosing of medication  - f/u urine and serum osm , urine sodium     #hypoxic respiratory failure on NRB   - unclear etiology, pleural effusion vs pna vs PE (ddimer elevated) eliquis held for possible para.    - cxr right basilar opacity, left pleural effusion   - duplex LE,      # Metastatic pancreatic cancer on chemotherapy   # HO Splenic/portal vein thrombosis  # malignant Ascites r/o SBP   # anemia   - CT a/p w IV con 11/29/2021: Portal venous structure evaluation limited. Rind of soft tissue surrounding proximal celiac artery as well as pancreatic ductal dilatation consistent with extension of neoplasm. Soft tissue density within CBD stent with associated mild to moderate intrahepatic biliary ductal dilatation.  - moderate abdominal tenderness on exam   - completed Foifininox which she was not able to tolerate well due to severe diarrhea, hypokalemia and weight loss on [place]-Abraxane per chart review  - on Lasix 40 mg at home, holding given hypotension   - heme onc evaluation   - palliative care consult   - will require paracentesis, US abdomen, hold eliquis given possible need for paracentesis   - keep active T&S  - abx cefepime and vanco    #malnutrition   #hypoalbuminemia   #hypoglycemia   - speech and swallow appreciated, soft and bite sized   - dietician eval   - d5 ns at 75ml/hr     #DVT PPx: SCD , hold eliquis   #GI PPx: PPI  #diet- soft and bite sized   code- DNR DNI          62 yo female with PMH of metastatic pancreatic cancer on chemotherapy (Dx 7/2021, follows Dr. 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    #Sepsis POA    #septic shock on levophed   #Gram positive bacteremia   - chemo port possible source, no external evidence of infection   - continue cefepime, start vancomycin 1g qd, fu trough   - repeat blood cultures   - TTE     #STEPHANI prerenal vs hepatorenal   # hypovolemic Hyponatremia   - continue IVF D5 ns at 70ml/hr   - monitor kidney function   - renal dosing of medication  - f/u urine and serum osm , urine sodium     #hypoxic respiratory failure on NRB   - unclear etiology, pleural effusion vs pna vs PE (ddimer elevated) home eliquis held for possible para.    - cxr right basilar opacity, left pleural effusion   - duplex LE,      # Metastatic pancreatic cancer on chemotherapy   # HO Splenic/portal vein thrombosis  # malignant Ascites r/o SBP   # anemia   - CT a/p w IV con 11/29/2021: Portal venous structure evaluation limited. Rind of soft tissue surrounding proximal celiac artery as well as pancreatic ductal dilatation consistent with extension of neoplasm. Soft tissue density within CBD stent with associated mild to moderate intrahepatic biliary ductal dilatation.  - moderate abdominal tenderness on exam   - completed Foifininox which she was not able to tolerate well due to severe diarrhea, hypokalemia and weight loss on [place]-Abraxane per chart review  - on Lasix 40 mg at home, holding given hypotension   - heme onc evaluation   - palliative care consult   - will require paracentesis, US abdomen, hold eliquis given possible need for paracentesis   - keep active T&S  - abx cefepime and vanco    #malnutrition   #hypoalbuminemia   #hypoglycemia   - speech and swallow appreciated, soft and bite sized   - dietician eval   - d5 ns at 75ml/hr     #DVT PPx: SCD , hold eliquis   #GI PPx: PPI  #diet- soft and bite sized   code- DNR DNI

## 2022-02-20 NOTE — PROGRESS NOTE ADULT - ASSESSMENT
60 yo female with PMH of metastatic pancreatic cancer on chemotherapy (Dx 7/2021, follows Dr. 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    #End stage of   Metastatic pancreatic cancer / HO Splenic/portal vein thrombosis/  malignant Ascites / suspected SBP/ anemia / Hypotension / STEPHANI/ [place] / Hyponatremia / severe malnutrition/ Hypoglycemia   - pt is actively dyeing   - consulted by oncology, no cancer directed therapy recommended   - palliative care team  discussed GOC with family, DNR/DNI singed on 2/18  - I spoke with family today, they agreed for comfort/ end of life care, hospice consulted, no VS, blood work,  d/c pressor, Abx and IV fluids today.   - Prognosis grave    I spoke with  and son at the bedside.

## 2022-02-22 ENCOUNTER — APPOINTMENT (OUTPATIENT)
Dept: INFUSION THERAPY | Facility: CLINIC | Age: 62
End: 2022-02-22

## 2022-02-22 ENCOUNTER — APPOINTMENT (OUTPATIENT)
Dept: HEMATOLOGY ONCOLOGY | Facility: CLINIC | Age: 62
End: 2022-02-22

## 2022-02-23 ENCOUNTER — NON-APPOINTMENT (OUTPATIENT)
Age: 62
End: 2022-02-23

## 2022-03-07 ENCOUNTER — APPOINTMENT (OUTPATIENT)
Dept: INFUSION THERAPY | Facility: CLINIC | Age: 62
End: 2022-03-07

## 2022-03-20 NOTE — CHART NOTE - NSCHARTNOTESELECT_GEN_ALL_CORE
Event Note
Palliative Care NP/Event Note
Event Note
Off Service Note
Palliative Care Social Work - Initial Assessment/Event Note

## 2022-09-07 NOTE — PRE-ANESTHESIA EVALUATION ADULT - BP NONINVASIVE DIASTOLIC (MM HG)
Acute Care - Speech Language Pathology   Swallow Initial Evaluation [facility]'  CLINICAL DYSPHAGIA ASSESSMENT     Patient Name: Evan Salazar  : 1954  MRN: [id number]  Today's Date: 2022  Onset of Illness/Injury or Date of Surgery: 22     Referring Physician: Emil      Admit Date: 2022    Visit Dx:     ICD-10-CM ICD-9-CM   1. Pneumonia due to COVID-19 virus  U07.1 480.8    J12.82 079.89     Patient Active Problem List   Diagnosis   • Mass of left testicle   • Pneumonia due to COVID-19 virus     Past Medical History:   Diagnosis Date   • Anxiety    • Arthritis    • COVID-19 2021    Received Casirivimab,Imdevimab in the ED; not hospitalized   • Epididymitis    • GERD (gastroesophageal reflux disease)    • Heartburn    • Hydrocele    • Kidney stone    • Type 2 diabetes mellitus (HCC)      Past Surgical History:   Procedure Laterality Date   • COLONOSCOPY     • CYSTOSCOPY W/ URETEROSCOPY W/ LITHOTRIPSY     • FINGER SURGERY     • HERNIA REPAIR     • SCROTAL EXPLORATION Left 2018    Procedure: SCROTAL EXPLORATION/ INGUINAL EXPLORATION HEMIORCHICETOMY WITH FROZEN SECTION.;  Surgeon: Edvin Mars MD;  Location: [facility];  Service: Urology     Evan Salazar  is seen at bedside this am on PCU to assess safety/efficacy of swallowing fnx, determine safest/least restrictive diet tolerance. RN and a family member are present during this assessment.     Mr. Salazar presented to [facility] ED w/ sob. He tested positive for covid-19 22. He is admitted w/ PNA 2/2 covid-19. He is referred to r/o dysphagia. He denies any overt s/s aspiration w/ po intake. He does report acute sore throat, impacting overall appetite and po intake.     Social History     Socioeconomic History   • Marital status:    Tobacco Use   • Smoking status: Never Smoker   • Smokeless tobacco: Never Used   Vaping Use   • Vaping Use: Never used   Substance and Sexual Activity   • Alcohol use: No   • Drug use: No   •  Sexual activity: Yes     Partners: Female      CT CHEST PULMONARY EMBOLISM W CONTRAST     INDICATION:   Productive cough. Covid positive.     TECHNIQUE:   CT angiogram of the chest with IV contrast. 3-D reconstructions were obtained and reviewed.   Radiation dose reduction techniques included automated exposure control or exposure modulation based on body size. Count of known CT and cardiac nuc med studies  performed in previous 12 months: 1.      COMPARISON:   None available.     FINDINGS:   There is no pulmonary embolism or aortic dissection. There is no pleural or pericardial effusion. There is no adenopathy. Upper abdominal images show gallstones within an otherwise normal-appearing gallbladder. There is elevation of the right  hemidiaphragm.     Lung windows demonstrate multifocal patchy areas of groundglass infiltrate, right greater than left, compatible with pneumonia. Commonly reported imaging features of COVID-19 pneumonia are present. Other processes such as influenza pneumonia and  organizing pneumonia can cause a similar imaging pattern. There is atelectasis in the right middle and right lower lobes due to the elevated hemidiaphragm. There is also some mild atelectasis in the left lower lobe. There are old mid thoracic compression  deformities with kyphosis.     IMPRESSION:     1. No PE or aortic dissection.  2. Multifocal pneumonia typical of Covid 19.  3. Atelectasis in the lower lobes and right middle lobe, right greater than left. There is elevation of the right hemidiaphragm.     Signer Name: Tylor Panda MD   Signed: 9/7/2022 4:14 AM   Workstation Name: [email or web address]    [facility]    Diet Orders (active) (From admission, onward)     Start     Ordered    09/07/22 0605  Diet Regular; Cardiac, Consistent Carbohydrate, Renal  Diet Effective Now         09/07/22 0604              He is observed on 2L O2 via NC w/o complications.     Pt is positioned upright and centered in bed  to accept multiple po presentations of ice chips, puree and thin liquids via spoon, cup and straw. RN provides whole po meds w/ thins liquids. Solid cracker deferred 2/2 acute odynophagia. He is able to self feed.     Facial/oral structures are symmetrical upon observation. Lingual protrusion reveals no deviation. Oral mucosa are moist, pink, and clean. Secretions are clear, thin, and well controlled. OROM/ANGIE is wfl to imitate oral postures. Gag is not assessed. Volitional cough is intact w/ adequate intensity, clear in quality, non-productive. He does evidene w/ baseline sporadic cough before, during and after this assessment, no direct correlation ot po presentations. Voice is adequate in intensity, clear in quality w/ intelligible speech.    Upon po presentations, adequate bolus anticipation and acceptance w/ good labial seal for bolus clearance via spoon bowl, cup rim stability and suction via straw. Bolus formation, manipulation and control are wfl w/ rotary mastication pattern. A-p transit is timely w/o oral residue. No overt s/s aspiration evidenced before the swallow.     Pharyngeal swallow is timely w/ adequate hyolaryngeal elevation per palpation. No overt s/s aspiration evidenced across this assessment. No silent aspiration suspected as pt is w/o changes in vocal quality, respirations or secretions post po presentations. He does report acute odynophagia, related to current covid-19 effects.    Impression: Per this assessment, Mr. Salazar presents w/ wfl oropharyngeal swallow w/o s/s aspiration. No s/s indicative of silent aspiration. Per reported acute covid-19 effects of odynophagia, he is felt to most benefit from modified mechanical soft diet, whole meats, thin liquids. May upgrade to least restrictive regular consistency as tolerated.     SLP Recommendation and Plan  1. Mechanical soft, whole meats, thin liquids. Upgrade as tolerated.    2. Meds whole in puree/thins.   3. Upright and centered for all  po intake.  4. MONE precautions.  5. Oral care protocol.  No further formal SLP f/u warranted at this time.    D/w pt results and recommendations w/ verbal agreement.    D/w RN results and recommendations w/ verbal agreement.    Thank you for allowing me to participate in the care of your patient-  Evelyn Barron M.A., CCC-SLP      EDUCATION  The patient has been educated in the following areas:   Dysphagia (Swallowing Impairment) Modified Diet Instruction.     Time Calculation:     Therapy Charges for Today     Code Description Service Date Service Provider Modifiers Qty    [id number]  ST EVAL ORAL PHARYNG SWALLOW 6 9/7/2022 Evelyn Barron MA,CCC-SLP GN 1             Evelyn Barron MA,CCC-SLP  9/7/2022   100

## 2022-10-26 NOTE — ED ADULT NURSE NOTE - CAS DISCH CONDITION
Quality 111:Pneumonia Vaccination Status For Older Adults: Pneumococcal vaccine (PPSV23) administered on or after patient’s 60th birthday and before the end of the measurement period Quality 226: Preventive Care And Screening: Tobacco Use: Screening And Cessation Intervention: Tobacco Screening not Performed for Medical Reasons Quality 130: Documentation Of Current Medications In The Medical Record: Current Medications Documented Detail Level: Detailed Quality 431: Preventive Care And Screening: Unhealthy Alcohol Use - Screening: Patient not identified as an unhealthy alcohol user when screened for unhealthy alcohol use using a systematic screening method Stable

## 2023-05-23 NOTE — ED ADULT TRIAGE NOTE - HEIGHT IN FEET
[Fatigue] : denies fatigue [Malaise] : denies malaise [Fever] : denies fever [Diffuse Joint Pain (Arthralgias)] : denies arthralgias [Muscle Aches, Generalized (Myalgias)] : denies myalgias [Yellow Skin Or Eyes (Jaundice)] : denies jaundice [Abdominal Pain] : denies abdominal pain [Urine Tests Nonspecific Abnormal Findings Biliuria] : denies dark urine [Light Colored Bowel Movement (Acholic Stools)] : denies pale stools [Insomnia] : denies insomnia [Skin: Rash] : denies rash [Itching (Pruritus)] : denies pruritus [Shortness Of Breath] : denies shortness of breath [Needlestick Exposure] : no needlestick exposure [Infected Sexual Partner] : no infected sexual partner [IV Drug Use] : no IV drug use [Tattoo] : no tattoos [Body Piercing] : no body piercing [Hemodialysis] : no hemodialysis [Transfusion before 1992] : no transfusion before 1992 [Transplant before 1992] : no transplant before 1992 [Incarceration] : no incarceration [Alcohol Abuse] : no alcohol abuse [Autoimmune Disorder] : no autoimmune disorder 5 [Household Contact to HBV] : no household contact to HBV [Travel to Endemic Area] : no travel to an endemic area [Occupational Exposure] : no occupational exposure [Cocaine Use] : no cocaine use [Wt Gain ___ Lbs] : no recent weight gain [Wt Loss ___ Lbs] : no recent weight loss [de-identified] : - 5/23/23: returns after 9 months instead of three, with his daughter. Saw Dr. Jacome a few days ago. Had bloodwork - LFTs and AFP nl, HBV PCR not done. Had MRI, EGD, and colonoscopy. Still has acid reflux. States he takes 2 pills - aspirin and tenofovir. Does not take carvedilol, lisinopril, and omeprazole. Feels well otherwise. \par \par - 8/5/22: Dong well, regained 10 lbs. Weight 172 lbs, BMI 28 unchanged. Clopidogrel stopped, ASA started by vascular surgery. EGD and colonoscopy not done. \par Labs 7/25: LFTs nl, AFP 2.1 ng/mL, HbA1c 6.1%.\par \par - 5/6/22: returns after bloodwork and CT in January. LFTs normal, AFP 2.2 (was never elevated).\par \par - 1/21/22: returns after resection of HCC. Tumor board presentation on 1/20/22 showed positive margin. Per Dr. Mcgowan's note, intra-operative impression was possible macrovascular portal vein invasion and staple line excised showing intravascular tumor (ie - NOT parenchymal reseciton margin). Feels well, denies abdominal pain except mild when he coughs or sneezes. Lost 10 lbs.\par Exam: abdomen soft, nontender, well-healed surgical scar. No edema.\par \par - 5/3/31: returns after hospitalization at [facility] 3/25 b/o SMA dissection, saw MARIA INES Messina after discharge, now on clopidogrel. Back pain has resolved.\par \par - 2/4/21: returns after bloodwork. MRI done at [facility] report not available. Had hypotension in December, , but no hematochezia or melena. Feels good, is active. Has not lost weight, BMI 28.2\par \par - 10/19/20: returns after US showed 1 cm liver lesion and MRI showed indeterminate lesion. Doing well. GERD controlled with omeprazole. \par \par - 7/20/20: returns after labs in January. Tolerated carvedilol 25 mg bid, is very active gardening, makes his own wine. Takes tenofovir.\par - 11/4/20: return visit\par - 6/3/19: here after EGD which showed large esophageal varices. Tolerating carvedilol 12.5 mg bid - I switched from nadolol b/o bradycardia on sub-optimal dose. Today HR in the 50s as always. Feels very energetic - built the handrails of stairs and porch around his house in 3 days, but has longstanding sleep problems at night. \par \par - 7/15/19: doing well, tolerating carvedilol 25 mg bid, but HR 49 and BP sometimes 90s at home - got dizzy at home after getting up quickly; also taking omeprazole for GERD seen on EGD 5/2019.  CT 2/2019 report reviewed with patient.\par \par - initial visit  2/5/19: Mr. KNOX is a 67 year old man with chronic hepatitis B (dx. 2004), on Viread since diagnosis, compensated cirrhosis, large varices that never bled, on carvedilol, GERD, overweight BMI 27, 176 lbs. \par \par weight: never signif. heavier than now - overweight. Alcohol: social use. Never more than 1 drink per day. SHx: born and raised in [facility] [place], lived in [place] after age 32. Wife [place].\par \par Workup:\par - 4/10/23 MRI abdomen wwo: cirrhosis, s/p R lobe resection. Steatosis. Scattered wedge-shaped shashank of arterial enhancement, likely perfusion phenomena. CCY. Spleen nl. Few small abdom. LN up to 1 cm. \par - 2/8/23 EGD: small varices 4 bands placed, scarring from prior treatment. LA-grade B esophagitis. Gastric erosions, biopsied. Path: chronic active gastritis, H. pylori negative.\par - 12/14/22 colonoscopy: good prep except part of cecum, small internal hemorrhoids, 1 diverticulum \par - 5/3/22 MRI abdomen: cirrhosis, s/p R hepatectomy, no suspicious liver mass. Bilat. hypervascular foci, likely shunts. Hemangioma segemtn 4, sub-cm, stable since 2016. CCY. Atherosclerosis, chronic 1.3 cm SMA dilatation, unchanged.\par - 1/19/22 CT abdomen: R lobe postoperative changes after partial hepatectomy. No evidence of residual tumor. Patent portal veins.  Cirrhosis. Peripheral hypervascular foci R lobe and L lobe - - suggestive of shunts. Spleen normal. No ascites. Atherosclerosis. 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 No overt cirrhotic features. Recommend MRI after 4-6 mo. Moderate fatty replacement-marbling throughout pancreas.\par - 9/25/20 US abdomen: cirrhosis, 1 cm liver lesion indeterminate.\par - 3/17/20 US abdomen: mild hepatic steatosis. No lesion. GB normal.\par - 9/18/19 MRI ([facility]): mildly cirrhotic liver, sub-cm lesions likely cysts. Spleen normal. Stable SMA dissection. Normal GB. 1.1 cm ivanna hepatitis T2 hyperintense and enhancing lymph node. \par - 2/25/19 CT abdomen wwo (scanned): cirrhosis, small probable hemangioma seg IVb. SMA dissection several cm. Atherosclerotic changes. No comparison possible.\par - 5/22/19 EGD: medium-sized varices, not banded. LA-grade C reflux esophagitis. Two gastric inlet patches in the upper esophagus. Mild erosive gastritis, biopsied. Started Omeprazole 40 mg/d. Given bradycardia, switched nadolol 20 mg/d to carvedilol - start 6.25 mg bid, increase to 12.5 mg bid after 3 days if tolerated. Path: nonspecific gastritis, H. pylori negative.\par - 11/2016 MRI abdomen: Cirrhosis. 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 The remaining vessels are patent. No significant varices. IMPRESSION: Cirrhosis. No evidence of hepatoma\par - Colonoscopy: normal in 2011
